# Patient Record
Sex: FEMALE | Race: WHITE | NOT HISPANIC OR LATINO | Employment: OTHER | URBAN - METROPOLITAN AREA
[De-identification: names, ages, dates, MRNs, and addresses within clinical notes are randomized per-mention and may not be internally consistent; named-entity substitution may affect disease eponyms.]

---

## 2017-04-20 RX ORDER — CLOPIDOGREL BISULFATE 75 MG/1
TABLET ORAL
Qty: 90 TABLET | Refills: 3 | Status: SHIPPED | OUTPATIENT
Start: 2017-04-20 | End: 2018-05-02 | Stop reason: SDUPTHER

## 2017-08-24 ENCOUNTER — APPOINTMENT (OUTPATIENT)
Dept: GENERAL RADIOLOGY | Facility: HOSPITAL | Age: 72
End: 2017-08-24

## 2017-08-24 ENCOUNTER — HOSPITAL ENCOUNTER (OUTPATIENT)
Facility: HOSPITAL | Age: 72
Setting detail: OBSERVATION
Discharge: HOME | End: 2017-08-27
Attending: EMERGENCY MEDICINE | Admitting: HOSPITALIST

## 2017-08-24 ENCOUNTER — APPOINTMENT (OUTPATIENT)
Dept: CT IMAGING | Facility: HOSPITAL | Age: 72
End: 2017-08-24

## 2017-08-24 DIAGNOSIS — S39.012A LUMBAR STRAIN, INITIAL ENCOUNTER: ICD-10-CM

## 2017-08-24 DIAGNOSIS — R26.2 DIFFICULTY WALKING: ICD-10-CM

## 2017-08-24 DIAGNOSIS — R55 SYNCOPE AND COLLAPSE: Primary | ICD-10-CM

## 2017-08-24 DIAGNOSIS — S09.90XA HEAD INJURY, INITIAL ENCOUNTER: ICD-10-CM

## 2017-08-24 LAB
ALBUMIN SERPL-MCNC: 4.1 G/DL (ref 3.5–5.2)
ALBUMIN/GLOB SERPL: 1.4 G/DL
ALP SERPL-CCNC: 83 U/L (ref 39–117)
ALT SERPL W P-5'-P-CCNC: 13 U/L (ref 1–33)
ANION GAP SERPL CALCULATED.3IONS-SCNC: 14.7 MMOL/L
AST SERPL-CCNC: 12 U/L (ref 1–32)
BASOPHILS # BLD AUTO: 0.01 10*3/MM3 (ref 0–0.2)
BASOPHILS NFR BLD AUTO: 0.1 % (ref 0–1.5)
BILIRUB SERPL-MCNC: 0.4 MG/DL (ref 0.1–1.2)
BUN BLD-MCNC: 27 MG/DL (ref 8–23)
BUN/CREAT SERPL: 26.2 (ref 7–25)
CALCIUM SPEC-SCNC: 9.4 MG/DL (ref 8.6–10.5)
CHLORIDE SERPL-SCNC: 102 MMOL/L (ref 98–107)
CO2 SERPL-SCNC: 25.3 MMOL/L (ref 22–29)
CREAT BLD-MCNC: 1.03 MG/DL (ref 0.57–1)
DEPRECATED RDW RBC AUTO: 43.5 FL (ref 37–54)
EOSINOPHIL # BLD AUTO: 0.04 10*3/MM3 (ref 0–0.7)
EOSINOPHIL NFR BLD AUTO: 0.3 % (ref 0.3–6.2)
ERYTHROCYTE [DISTWIDTH] IN BLOOD BY AUTOMATED COUNT: 13.7 % (ref 11.7–13)
GFR SERPL CREATININE-BSD FRML MDRD: 53 ML/MIN/1.73
GLOBULIN UR ELPH-MCNC: 2.9 GM/DL
GLUCOSE BLD-MCNC: 139 MG/DL (ref 65–99)
GLUCOSE BLDC GLUCOMTR-MCNC: 103 MG/DL (ref 70–130)
HCT VFR BLD AUTO: 41.8 % (ref 35.6–45.5)
HGB BLD-MCNC: 13.4 G/DL (ref 11.9–15.5)
HOLD SPECIMEN: NORMAL
HOLD SPECIMEN: NORMAL
IMM GRANULOCYTES # BLD: 0.03 10*3/MM3 (ref 0–0.03)
IMM GRANULOCYTES NFR BLD: 0.3 % (ref 0–0.5)
INR PPP: 1 (ref 0.9–1.1)
LYMPHOCYTES # BLD AUTO: 0.85 10*3/MM3 (ref 0.9–4.8)
LYMPHOCYTES NFR BLD AUTO: 7.3 % (ref 19.6–45.3)
MAGNESIUM SERPL-MCNC: 2.2 MG/DL (ref 1.6–2.4)
MCH RBC QN AUTO: 27.9 PG (ref 26.9–32)
MCHC RBC AUTO-ENTMCNC: 32.1 G/DL (ref 32.4–36.3)
MCV RBC AUTO: 87.1 FL (ref 80.5–98.2)
MONOCYTES # BLD AUTO: 0.93 10*3/MM3 (ref 0.2–1.2)
MONOCYTES NFR BLD AUTO: 7.9 % (ref 5–12)
NEUTROPHILS # BLD AUTO: 9.84 10*3/MM3 (ref 1.9–8.1)
NEUTROPHILS NFR BLD AUTO: 84.1 % (ref 42.7–76)
PLATELET # BLD AUTO: 201 10*3/MM3 (ref 140–500)
PMV BLD AUTO: 11.7 FL (ref 6–12)
POTASSIUM BLD-SCNC: 4.9 MMOL/L (ref 3.5–5.2)
PROT SERPL-MCNC: 7 G/DL (ref 6–8.5)
PROTHROMBIN TIME: 12.8 SECONDS (ref 11.7–14.2)
RBC # BLD AUTO: 4.8 10*6/MM3 (ref 3.9–5.2)
SODIUM BLD-SCNC: 142 MMOL/L (ref 136–145)
TROPONIN T SERPL-MCNC: <0.01 NG/ML (ref 0–0.03)
WBC NRBC COR # BLD: 11.7 10*3/MM3 (ref 4.5–10.7)
WHOLE BLOOD HOLD SPECIMEN: NORMAL
WHOLE BLOOD HOLD SPECIMEN: NORMAL

## 2017-08-24 PROCEDURE — 93005 ELECTROCARDIOGRAM TRACING: CPT

## 2017-08-24 PROCEDURE — 85025 COMPLETE CBC W/AUTO DIFF WBC: CPT | Performed by: EMERGENCY MEDICINE

## 2017-08-24 PROCEDURE — 71010 HC CHEST PA OR AP: CPT

## 2017-08-24 PROCEDURE — 96361 HYDRATE IV INFUSION ADD-ON: CPT

## 2017-08-24 PROCEDURE — 36415 COLL VENOUS BLD VENIPUNCTURE: CPT

## 2017-08-24 PROCEDURE — 96374 THER/PROPH/DIAG INJ IV PUSH: CPT

## 2017-08-24 PROCEDURE — 83735 ASSAY OF MAGNESIUM: CPT | Performed by: EMERGENCY MEDICINE

## 2017-08-24 PROCEDURE — 72110 X-RAY EXAM L-2 SPINE 4/>VWS: CPT

## 2017-08-24 PROCEDURE — 25010000002 ONDANSETRON PER 1 MG: Performed by: EMERGENCY MEDICINE

## 2017-08-24 PROCEDURE — 80053 COMPREHEN METABOLIC PANEL: CPT | Performed by: EMERGENCY MEDICINE

## 2017-08-24 PROCEDURE — 93010 ELECTROCARDIOGRAM REPORT: CPT | Performed by: INTERNAL MEDICINE

## 2017-08-24 PROCEDURE — 84484 ASSAY OF TROPONIN QUANT: CPT | Performed by: EMERGENCY MEDICINE

## 2017-08-24 PROCEDURE — 82962 GLUCOSE BLOOD TEST: CPT

## 2017-08-24 PROCEDURE — G0378 HOSPITAL OBSERVATION PER HR: HCPCS

## 2017-08-24 PROCEDURE — 99285 EMERGENCY DEPT VISIT HI MDM: CPT

## 2017-08-24 PROCEDURE — 85610 PROTHROMBIN TIME: CPT | Performed by: EMERGENCY MEDICINE

## 2017-08-24 PROCEDURE — 70450 CT HEAD/BRAIN W/O DYE: CPT

## 2017-08-24 RX ORDER — SODIUM CHLORIDE 0.9 % (FLUSH) 0.9 %
10 SYRINGE (ML) INJECTION AS NEEDED
Status: DISCONTINUED | OUTPATIENT
Start: 2017-08-24 | End: 2017-08-27

## 2017-08-24 RX ORDER — SODIUM CHLORIDE 0.9 % (FLUSH) 0.9 %
10 SYRINGE (ML) INJECTION AS NEEDED
Status: DISCONTINUED | OUTPATIENT
Start: 2017-08-24 | End: 2017-08-27 | Stop reason: HOSPADM

## 2017-08-24 RX ORDER — SODIUM CHLORIDE 9 MG/ML
125 INJECTION, SOLUTION INTRAVENOUS CONTINUOUS
Status: DISCONTINUED | OUTPATIENT
Start: 2017-08-24 | End: 2017-08-25

## 2017-08-24 RX ORDER — ONDANSETRON 2 MG/ML
4 INJECTION INTRAMUSCULAR; INTRAVENOUS ONCE
Status: COMPLETED | OUTPATIENT
Start: 2017-08-24 | End: 2017-08-24

## 2017-08-24 RX ADMIN — SODIUM CHLORIDE 125 ML/HR: 9 INJECTION, SOLUTION INTRAVENOUS at 22:23

## 2017-08-24 RX ADMIN — ONDANSETRON 4 MG: 2 INJECTION INTRAMUSCULAR; INTRAVENOUS at 22:23

## 2017-08-24 RX ADMIN — SODIUM CHLORIDE 500 ML: 9 INJECTION, SOLUTION INTRAVENOUS at 22:29

## 2017-08-25 ENCOUNTER — APPOINTMENT (OUTPATIENT)
Dept: NEUROLOGY | Facility: HOSPITAL | Age: 72
End: 2017-08-25
Attending: PSYCHIATRY & NEUROLOGY

## 2017-08-25 ENCOUNTER — APPOINTMENT (OUTPATIENT)
Dept: CARDIOLOGY | Facility: HOSPITAL | Age: 72
End: 2017-08-25
Attending: HOSPITALIST

## 2017-08-25 ENCOUNTER — APPOINTMENT (OUTPATIENT)
Dept: MRI IMAGING | Facility: HOSPITAL | Age: 72
End: 2017-08-25

## 2017-08-25 LAB
BH CV XLRA MEAS LEFT DIST CCA EDV: 20.4 CM/SEC
BH CV XLRA MEAS LEFT DIST CCA PSV: 85.6 CM/SEC
BH CV XLRA MEAS LEFT DIST ICA EDV: -53.4 CM/SEC
BH CV XLRA MEAS LEFT DIST ICA PSV: -171 CM/SEC
BH CV XLRA MEAS LEFT ICA/CCA RATIO: 2.2
BH CV XLRA MEAS LEFT MID ICA EDV: -52.3 CM/SEC
BH CV XLRA MEAS LEFT MID ICA PSV: -189 CM/SEC
BH CV XLRA MEAS LEFT PROX CCA EDV: 21.2 CM/SEC
BH CV XLRA MEAS LEFT PROX CCA PSV: 103 CM/SEC
BH CV XLRA MEAS LEFT PROX ECA EDV: -17.3 CM/SEC
BH CV XLRA MEAS LEFT PROX ECA PSV: -129 CM/SEC
BH CV XLRA MEAS LEFT PROX ICA EDV: -50.8 CM/SEC
BH CV XLRA MEAS LEFT PROX ICA PSV: -179 CM/SEC
BH CV XLRA MEAS LEFT PROX SCLA PSV: 211 CM/SEC
BH CV XLRA MEAS LEFT VERTEBRAL A EDV: -14.1 CM/SEC
BH CV XLRA MEAS LEFT VERTEBRAL A PSV: -53.4 CM/SEC
BH CV XLRA MEAS RIGHT DIST CCA EDV: 15.2 CM/SEC
BH CV XLRA MEAS RIGHT DIST CCA PSV: 68 CM/SEC
BH CV XLRA MEAS RIGHT DIST ICA EDV: -32.8 CM/SEC
BH CV XLRA MEAS RIGHT DIST ICA PSV: -141 CM/SEC
BH CV XLRA MEAS RIGHT ICA/CCA RATIO: 2
BH CV XLRA MEAS RIGHT MID ICA EDV: 29.1 CM/SEC
BH CV XLRA MEAS RIGHT MID ICA PSV: 108 CM/SEC
BH CV XLRA MEAS RIGHT PROX CCA EDV: 18.8 CM/SEC
BH CV XLRA MEAS RIGHT PROX CCA PSV: 85 CM/SEC
BH CV XLRA MEAS RIGHT PROX ECA EDV: -14.1 CM/SEC
BH CV XLRA MEAS RIGHT PROX ECA PSV: -113 CM/SEC
BH CV XLRA MEAS RIGHT PROX ICA EDV: -39.3 CM/SEC
BH CV XLRA MEAS RIGHT PROX ICA PSV: -137 CM/SEC
BH CV XLRA MEAS RIGHT PROX SCLA PSV: 156 CM/SEC
BH CV XLRA MEAS RIGHT VERTEBRAL A EDV: 11.1 CM/SEC
BH CV XLRA MEAS RIGHT VERTEBRAL A PSV: 49.8 CM/SEC
LEFT ARM BP: NORMAL MMHG
RIGHT ARM BP: NORMAL MMHG

## 2017-08-25 PROCEDURE — 25010000002 PERFLUTREN (DEFINITY) 8.476 MG IN SODIUM CHLORIDE 10 ML INJECTION: Performed by: HOSPITALIST

## 2017-08-25 PROCEDURE — 96376 TX/PRO/DX INJ SAME DRUG ADON: CPT

## 2017-08-25 PROCEDURE — 95819 EEG AWAKE AND ASLEEP: CPT | Performed by: PSYCHIATRY & NEUROLOGY

## 2017-08-25 PROCEDURE — 95819 EEG AWAKE AND ASLEEP: CPT

## 2017-08-25 PROCEDURE — G0378 HOSPITAL OBSERVATION PER HR: HCPCS

## 2017-08-25 PROCEDURE — 25010000002 ONDANSETRON PER 1 MG: Performed by: HOSPITALIST

## 2017-08-25 PROCEDURE — 99204 OFFICE O/P NEW MOD 45 MIN: CPT | Performed by: PSYCHIATRY & NEUROLOGY

## 2017-08-25 PROCEDURE — 93306 TTE W/DOPPLER COMPLETE: CPT | Performed by: INTERNAL MEDICINE

## 2017-08-25 PROCEDURE — 25810000003 SODIUM CHLORIDE 0.9 % WITH KCL 20 MEQ 20-0.9 MEQ/L-% SOLUTION: Performed by: HOSPITALIST

## 2017-08-25 PROCEDURE — 0 GADOBENATE DIMEGLUMINE 529 MG/ML SOLUTION: Performed by: HOSPITALIST

## 2017-08-25 PROCEDURE — 93880 EXTRACRANIAL BILAT STUDY: CPT

## 2017-08-25 PROCEDURE — 25010000002 LORAZEPAM PER 2 MG: Performed by: HOSPITALIST

## 2017-08-25 PROCEDURE — 70553 MRI BRAIN STEM W/O & W/DYE: CPT

## 2017-08-25 PROCEDURE — C8929 TTE W OR WO FOL WCON,DOPPLER: HCPCS

## 2017-08-25 PROCEDURE — 96375 TX/PRO/DX INJ NEW DRUG ADDON: CPT

## 2017-08-25 PROCEDURE — 96361 HYDRATE IV INFUSION ADD-ON: CPT

## 2017-08-25 PROCEDURE — A9577 INJ MULTIHANCE: HCPCS | Performed by: HOSPITALIST

## 2017-08-25 RX ORDER — ASPIRIN 81 MG/1
81 TABLET, CHEWABLE ORAL DAILY
Status: DISCONTINUED | OUTPATIENT
Start: 2017-08-25 | End: 2017-08-25

## 2017-08-25 RX ORDER — MELATONIN
2000 NIGHTLY
Status: DISCONTINUED | OUTPATIENT
Start: 2017-08-25 | End: 2017-08-27 | Stop reason: HOSPADM

## 2017-08-25 RX ORDER — ACETAMINOPHEN 160 MG
2000 TABLET,DISINTEGRATING ORAL DAILY
Status: DISCONTINUED | OUTPATIENT
Start: 2017-08-25 | End: 2017-08-25 | Stop reason: CLARIF

## 2017-08-25 RX ORDER — AMLODIPINE BESYLATE 10 MG/1
10 TABLET ORAL NIGHTLY
Status: DISCONTINUED | OUTPATIENT
Start: 2017-08-25 | End: 2017-08-26 | Stop reason: SDUPTHER

## 2017-08-25 RX ORDER — ATORVASTATIN CALCIUM 20 MG/1
20 TABLET, FILM COATED ORAL DAILY
Status: DISCONTINUED | OUTPATIENT
Start: 2017-08-25 | End: 2017-08-25

## 2017-08-25 RX ORDER — ACETAMINOPHEN 325 MG/1
650 TABLET ORAL EVERY 4 HOURS PRN
Status: DISCONTINUED | OUTPATIENT
Start: 2017-08-25 | End: 2017-08-27

## 2017-08-25 RX ORDER — LORAZEPAM 2 MG/ML
1 INJECTION INTRAMUSCULAR ONCE
Status: COMPLETED | OUTPATIENT
Start: 2017-08-25 | End: 2017-08-25

## 2017-08-25 RX ORDER — ATORVASTATIN CALCIUM 20 MG/1
10 TABLET, FILM COATED ORAL NIGHTLY
Status: DISCONTINUED | OUTPATIENT
Start: 2017-08-25 | End: 2017-08-27 | Stop reason: HOSPADM

## 2017-08-25 RX ORDER — AMLODIPINE BESYLATE 10 MG/1
10 TABLET ORAL DAILY
Status: DISCONTINUED | OUTPATIENT
Start: 2017-08-25 | End: 2017-08-25

## 2017-08-25 RX ORDER — AMLODIPINE BESYLATE 5 MG/1
5 TABLET ORAL DAILY
Status: DISCONTINUED | OUTPATIENT
Start: 2017-08-25 | End: 2017-08-25

## 2017-08-25 RX ORDER — VALSARTAN 160 MG/1
160 TABLET ORAL DAILY
Status: DISCONTINUED | OUTPATIENT
Start: 2017-08-25 | End: 2017-08-25

## 2017-08-25 RX ORDER — ONDANSETRON 2 MG/ML
4 INJECTION INTRAMUSCULAR; INTRAVENOUS EVERY 4 HOURS PRN
Status: DISCONTINUED | OUTPATIENT
Start: 2017-08-25 | End: 2017-08-27

## 2017-08-25 RX ORDER — ASPIRIN 81 MG/1
81 TABLET, CHEWABLE ORAL NIGHTLY
Status: DISCONTINUED | OUTPATIENT
Start: 2017-08-25 | End: 2017-08-26

## 2017-08-25 RX ORDER — ATORVASTATIN CALCIUM 80 MG/1
80 TABLET, FILM COATED ORAL NIGHTLY
Status: DISCONTINUED | OUTPATIENT
Start: 2017-08-25 | End: 2017-08-25

## 2017-08-25 RX ORDER — ZOLPIDEM TARTRATE 5 MG/1
10 TABLET ORAL NIGHTLY PRN
Status: DISCONTINUED | OUTPATIENT
Start: 2017-08-25 | End: 2017-08-27

## 2017-08-25 RX ORDER — ONDANSETRON 2 MG/ML
4 INJECTION INTRAMUSCULAR; INTRAVENOUS ONCE
Status: DISCONTINUED | OUTPATIENT
Start: 2017-08-25 | End: 2017-08-25

## 2017-08-25 RX ORDER — MELATONIN
2000 DAILY
Status: DISCONTINUED | OUTPATIENT
Start: 2017-08-25 | End: 2017-08-25

## 2017-08-25 RX ORDER — PANTOPRAZOLE SODIUM 40 MG/1
40 TABLET, DELAYED RELEASE ORAL
Status: DISCONTINUED | OUTPATIENT
Start: 2017-08-25 | End: 2017-08-26

## 2017-08-25 RX ORDER — ATORVASTATIN CALCIUM 10 MG/1
10 TABLET, FILM COATED ORAL DAILY
Status: DISCONTINUED | OUTPATIENT
Start: 2017-08-25 | End: 2017-08-25

## 2017-08-25 RX ORDER — CLOPIDOGREL BISULFATE 75 MG/1
75 TABLET ORAL DAILY
Status: DISCONTINUED | OUTPATIENT
Start: 2017-08-25 | End: 2017-08-25

## 2017-08-25 RX ORDER — CLOPIDOGREL BISULFATE 75 MG/1
75 TABLET ORAL DAILY
Status: DISCONTINUED | OUTPATIENT
Start: 2017-08-25 | End: 2017-08-27 | Stop reason: HOSPADM

## 2017-08-25 RX ORDER — FAMOTIDINE 20 MG/1
20 TABLET, FILM COATED ORAL 2 TIMES DAILY
Status: DISCONTINUED | OUTPATIENT
Start: 2017-08-25 | End: 2017-08-25

## 2017-08-25 RX ORDER — VALSARTAN 40 MG/1
40 TABLET ORAL DAILY
Status: DISCONTINUED | OUTPATIENT
Start: 2017-08-25 | End: 2017-08-27 | Stop reason: HOSPADM

## 2017-08-25 RX ORDER — SODIUM CHLORIDE AND POTASSIUM CHLORIDE 150; 900 MG/100ML; MG/100ML
75 INJECTION, SOLUTION INTRAVENOUS CONTINUOUS
Status: DISCONTINUED | OUTPATIENT
Start: 2017-08-25 | End: 2017-08-27

## 2017-08-25 RX ADMIN — PERFLUTREN 6 ML: 6.52 INJECTION, SUSPENSION INTRAVENOUS at 14:15

## 2017-08-25 RX ADMIN — ONDANSETRON 4 MG: 2 INJECTION INTRAMUSCULAR; INTRAVENOUS at 19:53

## 2017-08-25 RX ADMIN — POTASSIUM CHLORIDE AND SODIUM CHLORIDE 75 ML/HR: 900; 150 INJECTION, SOLUTION INTRAVENOUS at 03:44

## 2017-08-25 RX ADMIN — ONDANSETRON 4 MG: 2 INJECTION INTRAMUSCULAR; INTRAVENOUS at 15:46

## 2017-08-25 RX ADMIN — POTASSIUM CHLORIDE AND SODIUM CHLORIDE 125 ML/HR: 900; 150 INJECTION, SOLUTION INTRAVENOUS at 15:46

## 2017-08-25 RX ADMIN — GADOBENATE DIMEGLUMINE 20 ML: 529 INJECTION, SOLUTION INTRAVENOUS at 20:40

## 2017-08-25 RX ADMIN — ACETAMINOPHEN 650 MG: 325 TABLET ORAL at 03:44

## 2017-08-25 RX ADMIN — LORAZEPAM 1 MG: 2 INJECTION INTRAMUSCULAR; INTRAVENOUS at 19:53

## 2017-08-25 RX ADMIN — ONDANSETRON 4 MG: 2 INJECTION INTRAMUSCULAR; INTRAVENOUS at 03:44

## 2017-08-25 NOTE — ED NOTES
Syncopal episode while standing outside of [employer] watching someone change her car battery. Pt reports feeling hot & nauseated just before incident. Pt reports low back pn, & headache from falling during the incident.      Marcie Devries RN  08/24/17 2050

## 2017-08-25 NOTE — PROGRESS NOTES
Discharge Planning Assessment  [facility]     Patient Name: Mag Silva  MRN: [id number]  Today's Date: 8/25/2017    Admit Date: 8/24/2017          Discharge Needs Assessment       08/25/17 1045    Living Environment    Lives With alone    Living Arrangements house    Home Accessibility no concerns;bed and bath on same level    Stair Railings at Home outside, present at both sides    Type of Financial/Environmental Concern none    Transportation Available car    Living Environment    Quality Of Family Relationships supportive    Able to Return to Prior Living Arrangements yes    Discharge Needs Assessment    Concerns To Be Addressed no discharge needs identified    Readmission Within The Last 30 Days no previous admission in last 30 days    Anticipated Changes Related to Illness none    Equipment Currently Used at Home none    Equipment Needed After Discharge none            Discharge Plan       08/25/17 1045    Case Management/Social Work Plan    Plan home- no needs    Patient/Family In Agreement With Plan yes    Additional Comments [facility] notice signed 08/25/17.  Facesheet verified.  Spoke with patient in room.  Introduced self and explained role.  Patient lives in a ss house alone.  Patient IADLS prior to admit.  She does not use any DME and does not have a HH or SNU history.  Denies any needs and plans to return home.  CCP will follow.         Discharge Placement     No information found                Demographic Summary       08/25/17 1043    Referral Information    Admission Type observation    Referral Source admission list    Reason For Consult discharge planning    Primary Care Physician Information    Name Dr Caceres            Functional Status       08/25/17 1043    Functional Status Current    Ambulation 0-->independent    Transferring 0-->independent    Toileting 0-->independent    Bathing 0-->independent    Dressing 0-->independent    Eating 0-->independent    Communication  0-->understands/communicates without difficulty    Swallowing (if score 2 or more for any item, consult Rehab Services) 0-->swallows foods/liquids without difficulty    Change in Functional Status Since Onset of Current Illness/Injury no    Functional Status Prior    Ambulation 0-->independent    Transferring 0-->independent    Toileting 0-->independent    Bathing 0-->independent    Dressing 0-->independent    Eating 0-->independent    Communication 0-->understands/communicates without difficulty    Swallowing 0-->swallows foods/liquids without difficulty    Activity Tolerance    Current Activity Limitations none    Cognitive/Perceptual/Developmental    Current Mental Status/Cognitive Functioning no deficits noted    Recent Changes in Mental Status/Cognitive Functioning no changes            Psychosocial     None            Abuse/Neglect     None            Legal     None            Substance Abuse     None            Patient Forms     None          Dana Anne, RN

## 2017-08-25 NOTE — SIGNIFICANT NOTE
08/25/17 1237   Rehab Treatment   Discipline physical therapist   Rehab Evaluation   Evaluation Not Performed patient unavailable for evaluation  (off the floor to EEG. Will follow up tomorrow.)   Recommendation   PT - Next Appointment 08/26/17

## 2017-08-25 NOTE — PLAN OF CARE
Problem: Patient Care Overview (Adult)  Goal: Plan of Care Review  Outcome: Ongoing (interventions implemented as appropriate)    08/25/17 0322   Coping/Psychosocial Response Interventions   Plan Of Care Reviewed With patient   Patient Care Overview   Progress progress toward functional goals as expected   Outcome Evaluation   Outcome Summary/Follow up Plan VSS, pt admit for ER with syncopal episodes, c/o back pain and nausea, gave medications for both, will continue to monitor.          Problem: Pain, Acute (Adult)  Goal: Identify Related Risk Factors and Signs and Symptoms  Outcome: Ongoing (interventions implemented as appropriate)  Goal: Acceptable Pain Control/Comfort Level  Outcome: Ongoing (interventions implemented as appropriate)

## 2017-08-25 NOTE — NURSING NOTE
MRI delayed- trying to retrieve records from [facility] and patient for information regarding stent type and name. [facility] records received and noted.     Discussed with MRI- will wait until patient brings personal card to see if information is present.

## 2017-08-25 NOTE — ED PROVIDER NOTES
EMERGENCY DEPARTMENT ENCOUNTER    CHIEF COMPLAINT  Chief Complaint: Syncope  History given by: patient, pt's family  History limited by: nothing  Room Number: 26/26  PMD: Vicki Caceres MD      HPI:  Pt is a 71 y.o. female who presents complaining of a syncopal episode that occurred today. Pt states that she was standing outside a store in the parking area and fainted, reporting she struck her head and that her back landed on a cement curb. Pt states she felt nauseated before the episode, but denies any CP, palpitations, or SOA prior to the episode. Pt reports that witnesses stated that she was unconscious for less than 1 minute. Pt complains of back pain, and nausea. Pt denies urinary incontinence, melena, hematochezia, headache, vomiting/diarrhea recently, previous episodes, changes in appetite, hx of seizures, cough, or cold. Pt is on Plavix.     Duration:  PTA  Onset: sudden  Timing: episodic  Quality: syncope  Intensity/Severity: moderate  Progression: resolved  Associated Symptoms: nausea, back pain  Aggravating Factors: none specified  Alleviating Factors: none specified  Previous Episodes: Pt has had no previous episodes  Treatment before arrival: None    PAST MEDICAL HISTORY  Active Ambulatory Problems     Diagnosis Date Noted   • Coronary arteriosclerosis in native artery 04/28/2016   • Benign essential hypertension 04/28/2016   • Chronic coronary artery disease 03/04/2013   • Chest pain 01/26/2013   • Chronic obstructive pulmonary disease 01/26/2013   • Dyslipidemia 01/26/2013   • Ventricular premature beats 04/28/2016   • Syncope 01/26/2013   • Hyperlipidemia 11/30/2016     Resolved Ambulatory Problems     Diagnosis Date Noted   • No Resolved Ambulatory Problems     Past Medical History:   Diagnosis Date   • Chest pain    • COPD (chronic obstructive pulmonary disease)    • Coronary artery disease    • Crescendo angina    • Dyslipidemia    • Hypertension    • PVC (premature ventricular contraction)    •  Syncope        PAST SURGICAL HISTORY  Past Surgical History:   Procedure Laterality Date   • BACK SURGERY     • CARDIAC CATHETERIZATION     • CAROTID STENT     • HYSTERECTOMY     • NECK SURGERY         FAMILY HISTORY  Family History   Problem Relation Age of Onset   • Stroke Mother    • Asthma Father    • Cancer Father        SOCIAL HISTORY  Social History     Social History   • Marital status:      Spouse name: N/A   • Number of children: N/A   • Years of education: N/A     Occupational History   • Not on file.     Social History Main Topics   • Smoking status: Former Smoker   • Smokeless tobacco: Not on file   • Alcohol use No   • Drug use: Not on file   • Sexual activity: Not on file     Other Topics Concern   • Not on file     Social History Narrative       ALLERGIES  Theophyllines    REVIEW OF SYSTEMS  Review of Systems   Constitutional: Negative for chills and fever.   HENT: Negative for sore throat and trouble swallowing.    Eyes: Negative for visual disturbance.   Respiratory: Negative for cough and shortness of breath.    Cardiovascular: Negative for chest pain, palpitations and leg swelling.   Gastrointestinal: Positive for nausea. Negative for abdominal pain, diarrhea and vomiting.   Endocrine: Negative.    Genitourinary: Negative for decreased urine volume, dysuria and frequency.   Musculoskeletal: Positive for back pain. Negative for neck pain.   Skin: Negative for rash.   Allergic/Immunologic: Negative.    Neurological: Positive for syncope. Negative for weakness, numbness and headaches.   Hematological: Negative.    Psychiatric/Behavioral: Negative.    All other systems reviewed and are negative.      PHYSICAL EXAM  ED Triage Vitals   Temp Heart Rate Resp BP SpO2   08/24/17 2052 08/24/17 2050 08/24/17 2050 08/24/17 2050 08/24/17 2050   97 °F (36.1 °C) 75 18 140/85 95 %      Temp src Heart Rate Source Patient Position BP Location FiO2 (%)   -- 08/24/17 2103 08/24/17 2103 08/24/17 2103 --     Monitor Lying Left arm        Physical Exam   Constitutional: She is oriented to person, place, and time. She appears distressed (mild).   HENT:   Head: Normocephalic.   No obvious contusions/swelling to head/face   Eyes: EOM are normal.   Neck: Normal range of motion. Neck supple.   Cardiovascular: Normal rate, regular rhythm, normal heart sounds and intact distal pulses.    No murmur heard.  Pulses:       Posterior tibial pulses are 2+ on the right side, and 2+ on the left side.   Pulmonary/Chest: Effort normal and breath sounds normal. No respiratory distress.   Abdominal: Soft. Bowel sounds are normal. There is no tenderness. There is no rebound, no guarding and no CVA tenderness.   Musculoskeletal: Normal range of motion. She exhibits no edema.        Cervical back: She exhibits no tenderness.        Thoracic back: She exhibits no bony tenderness.        Lumbar back: She exhibits bony tenderness (diffuse without step off).   No step off  No swelling/eccymosis/abrasion noted to back   Neurological: She is alert and oriented to person, place, and time.   Skin: Skin is warm and dry.   No obvious bruising   Psychiatric: Affect normal.   Nursing note and vitals reviewed.      LAB RESULTS  Lab Results (last 24 hours)     Procedure Component Value Units Date/Time    CBC & Differential [[id number]] Collected:  08/24/17 2145    Specimen:  Blood Updated:  08/24/17 2156    Narrative:       The following orders were created for panel order CBC & Differential.  Procedure                               Abnormality         Status                     ---------                               -----------         ------                     CBC Auto Differential[[id number]]        Abnormal            Final result                 Please view results for these tests on the individual orders.    Comprehensive Metabolic Panel [[id number]]  (Abnormal) Collected:  08/24/17 2145    Specimen:  Blood Updated:  08/24/17 2215     Glucose 139 (H)  mg/dL      BUN 27 (H) mg/dL      Creatinine 1.03 (H) mg/dL      Sodium 142 mmol/L      Potassium 4.9 mmol/L      Chloride 102 mmol/L      CO2 25.3 mmol/L      Calcium 9.4 mg/dL      Total Protein 7.0 g/dL      Albumin 4.10 g/dL      ALT (SGPT) 13 U/L      AST (SGOT) 12 U/L      Alkaline Phosphatase 83 U/L      Total Bilirubin 0.4 mg/dL      eGFR Non African Amer 53 (L) mL/min/1.73      Globulin 2.9 gm/dL      A/G Ratio 1.4 g/dL      BUN/Creatinine Ratio 26.2 (H)     Anion Gap 14.7 mmol/L     Magnesium [[id number]]  (Normal) Collected:  08/24/17 2145    Specimen:  Blood Updated:  08/24/17 2215     Magnesium 2.2 mg/dL     Troponin [[id number]]  (Normal) Collected:  08/24/17 2145    Specimen:  Blood Updated:  08/24/17 2215     Troponin T <0.010 ng/mL     Narrative:       Troponin T Reference Ranges:  Less than 0.03 ng/mL:    Negative for AMI  0.03 to 0.09 ng/mL:      Indeterminant for AMI  Greater than 0.09 ng/mL: Positive for AMI    CBC Auto Differential [[id number]]  (Abnormal) Collected:  08/24/17 2145    Specimen:  Blood Updated:  08/24/17 2156     WBC 11.70 (H) 10*3/mm3      RBC 4.80 10*6/mm3      Hemoglobin 13.4 g/dL      Hematocrit 41.8 %      MCV 87.1 fL      MCH 27.9 pg      MCHC 32.1 (L) g/dL      RDW 13.7 (H) %      RDW-SD 43.5 fl      MPV 11.7 fL      Platelets 201 10*3/mm3      Neutrophil % 84.1 (H) %      Lymphocyte % 7.3 (L) %      Monocyte % 7.9 %      Eosinophil % 0.3 %      Basophil % 0.1 %      Immature Grans % 0.3 %      Neutrophils, Absolute 9.84 (H) 10*3/mm3      Lymphocytes, Absolute 0.85 (L) 10*3/mm3      Monocytes, Absolute 0.93 10*3/mm3      Eosinophils, Absolute 0.04 10*3/mm3      Basophils, Absolute 0.01 10*3/mm3      Immature Grans, Absolute 0.03 10*3/mm3     Protime-INR [[id number]]  (Normal) Collected:  08/24/17 2145    Specimen:  Blood Updated:  08/24/17 2250     Protime 12.8 Seconds      INR 1.00    POC Glucose Fingerstick [[id number]]  (Normal) Collected:  08/24/17 2157    Specimen:  Blood  Updated:  08/24/17 2159     Glucose 103 mg/dL     Narrative:       Meter: [id number] : [id number] Fausto Moreno  [employer]          I ordered the above labs and reviewed the results    RADIOLOGY  XR Spine Lumbar 4+ View   Preliminary Result   No acute findings.           ----------------------------------------------------------------       X-RAY LUMBAR SPINE 4 VIEWS.       HISTORY: Trauma, back pain.       COMPARISON: No prior studies for comparison.       FINDINGS:   Vertebral body height is normal, no acute fracture.  Straightening of   the lordotic curve may be due to muscle spasm. Multilevel mild spurring   and loss of intervertebral disc height, mild facet joint disease.       Severe atherosclerotic disease of the aorta.        IMPRESSION:   No fracture or subluxation of the lumbar spine.                  XR Chest 1 View   Preliminary Result   No acute findings.           ----------------------------------------------------------------       X-RAY LUMBAR SPINE 4 VIEWS.       HISTORY: Trauma, back pain.       COMPARISON: No prior studies for comparison.       FINDINGS:   Vertebral body height is normal, no acute fracture.  Straightening of   the lordotic curve may be due to muscle spasm. Multilevel mild spurring   and loss of intervertebral disc height, mild facet joint disease.       Severe atherosclerotic disease of the aorta.        IMPRESSION:   No fracture or subluxation of the lumbar spine.              CT Head Without Contrast   Preliminary Result   1.  No definite acute intracranial pathology.    2.  Fluid in both sphenoid sinuses may represent sinusitis.                       I ordered the above noted radiological studies. Interpreted by radiologist. Reviewed by me in PACS.       PROCEDURES  Procedures    EKG         EKG time: 2150   Rhythm/Rate: sinus rhythm, rate: 70s  Normal P waves and normal [place] interval   Normal QRS, Normal axis  Normal ST and T waves    Interpreted Contemporaneously by me,  independently viewed  unchanged compared to prior 2/24/16        PROGRESS AND CONSULTS  ED Course     2137  Ordered EKG, blood work, troponin, and magnesium for further evaluation.     2221  Ordered Zofran for nausea and IVF for hydration. Ordered CXR, CT head, and XR L spine for further evaluation.     0026  Placed call to [facility] for admission    0033  Discussed pt's case with Dr. Frances ([facility]), who agrees to admit to telemetry for further evaluation.     0053  Rechecked pt. Notified pt of lab and imaging findings that do not indicate a clear cause for her syncopal episodes, and plan to admit for further evaluation. Pt agrees to plan and all questions are addressed.     MEDICAL DECISION MAKING  Results were reviewed/discussed with the patient and they were also made aware of online access. Pt also made aware that some labs, such as cultures, will not be resulted during ER visit and follow up with PMD is necessary.     MDM  Number of Diagnoses or Management Options     Amount and/or Complexity of Data Reviewed  Clinical lab tests: ordered and reviewed  Tests in the radiology section of CPT®: ordered and reviewed  Tests in the medicine section of CPT®: ordered and reviewed (See procedure EKG note)  Decide to obtain previous medical records or to obtain history from someone other than the patient: yes    Patient Progress  Patient progress: stable         DIAGNOSIS  Final diagnoses:   Syncope and collapse   Head injury, initial encounter   Lumbar strain, initial encounter       DISPOSITION  ADMISSION    Discussed treatment plan and reason for admission with pt/family and admitting physician.  Pt/family voiced understanding of the plan for admission for further testing/treatment as needed.         Latest Documented Vital Signs:  As of 12:57 AM  BP- 144/63 HR- 86 Temp- 97 °F (36.1 °C) O2 sat- 94%    --  Documentation assistance provided by maurizio Hung for Dr. Rodriguez.  Information recorded by the maurizio was done  at my direction and has been verified and validated by me.        Adela Hung  08/25/17 0057       Debbie Rodriguez MD  08/26/17 5085

## 2017-08-25 NOTE — SIGNIFICANT NOTE
08/25/17 1454   Rehab Treatment   Discipline occupational therapist   Rehab Evaluation   Evaluation Not Performed patient unavailable for evaluation  (vascular 1454)

## 2017-08-25 NOTE — PLAN OF CARE
Problem: Patient Care Overview (Adult)  Goal: Plan of Care Review  Outcome: Ongoing (interventions implemented as appropriate)    08/25/17 7996   Coping/Psychosocial Response Interventions   Plan Of Care Reviewed With patient   Patient Care Overview   Progress no change   Outcome Evaluation   Outcome Summary/Follow up Plan EEG, carotid duplex and ECHO completed today, waiting on information for MRI screening sheet for MRI. nausea reported today, zofran given. report of diarrhea- need Cdiff sample an waiting for callback from MD for med order. PT/OT to see tomorrow- missed today. VSS, home meds added. no c/o dizziness thus far. will cont to monitor,

## 2017-08-25 NOTE — CONSULTS
"NEUROLOGY CONSULTATION        PATIENT Mag Silva    1945   MRN [id number]   ADMIT DATE 2017   LENGTH OF STAY 0 days   ATTENDING Perry Frances MD       HISTORY OF PRESENT ILLNESS:  This is a 71-year-old woman admitted for loss of consciousness episode.    I obtained history from the patient and I talked to her daughter who was a witness on the phone.  She was with her daughter at [facility].  With the battery problem and they were looking at the battery outside the store.  Some \"acid\" coming out of battery which smelled quite bad and upset the patient.  She walked away from her granddaughters car and sat in her truck which was just nearby.  Dr. yadav on the phone who says that the patient said that she just felt bad and complained in particular nausea.  He appeared to be in a daze but was still awake and was still making sense so she was talking but in low volumes.  He went around to the front of her truck and leaned on the front.  He then stood up and lost consciousness and hit her head hit the concrete and her daughter said that she could hear her head bounce.  He was unconscious for perhaps 2 minutes.  No convincing post ictal confusion.  Continued in the supine position to the EMTs arrived and she was brought to the hospital.  The granddaughter did not note any confusion.  There was no incontinence at that point apparently in the ER she was found to be incontinent.  Then this morning she had an episode of urinary incontinence but no seizure in the hospital.  Did not bite her tongue.    She has no history of epilepsy meningitis encephalitis or any other neurologic problems.    Apparently for 5 years ago lost consciousness she was at work she doesn't remember any details of this but she was not told by anyone that she had seizure.    At present the patient has no complaints.    CHIEF COMPLAINT: Syncope      DIAGNOSIS: Syncope and collapse [R55]  Syncope [R55]  Lumbar strain, initial " "encounter [S39.012A]  Head injury, initial encounter [S09.90XA]      PAST MEDICAL HISTORY:   Past Medical History:   Diagnosis Date   • Chest pain    • COPD (chronic obstructive pulmonary disease)    • Coronary artery disease    • Crescendo angina    • Dyslipidemia    • Hypertension    • PVC (premature ventricular contraction)    • Syncope        PAST SURGICAL HISTORY:  Past Surgical History:   Procedure Laterality Date   • BACK SURGERY     • CARDIAC CATHETERIZATION     • CAROTID STENT     • HYSTERECTOMY     • NECK SURGERY         CURRENT MEDICATIONS:  Scheduled Medications:  aspirin 81 mg Oral Daily   atorvastatin 80 mg Oral Nightly   clopidogrel 75 mg Oral Daily   pantoprazole 40 mg Oral Q AM     Infusions:   sodium chloride 0.9 % with KCl 20 mEq 75 mL/hr Last Rate: 75 mL/hr (08/25/17 0807)     PRN Medications:  •  acetaminophen  •  ondansetron  •  sodium chloride  •  Insert peripheral IV **AND** sodium chloride    SOCIAL HISTORY:  Social History     Social History   • Marital status:      Spouse name: N/A   • Number of children: N/A   • Years of education: N/A     Social History Main Topics   • Smoking status: Former Smoker     Packs/day: 1.50     Years: 20.00     Types: Cigarettes     Quit date: 1/25/2004   • Smokeless tobacco: None   • Alcohol use No   • Drug use: No   • Sexual activity: No     Other Topics Concern   • None     Social History Narrative       FAMILY HISTORY:   I have reviewed this patient's family history and it is not significant to the present illness.      REVIEW OF SYSTEMS: 14 system review performed and all other systems are negative except for Syncope         PHYSICAL EXAM:  GENERAL: Reveals a man/woman who appears his/her stated age, in no acute distress.  VITAL SIGNS: /60 (BP Location: Left arm, Patient Position: Lying)  Pulse 84  Temp 99.5 °F (37.5 °C) (Oral)   Resp 18  Ht 64\" (162.6 cm)  Wt 207 lb 8 oz (94.1 kg)  SpO2 95%  BMI 35.62 kg/m2  NECK: Carotids are equal " without bruits.   HEART: Regular rate and rhythm with no significant murmur or gallop.   EXTREMITIES: Nonedematous. Pulses are intact. There is no rash. There is no hepatosplenomegaly. No respiratory distress. There is no lymphadenopathy. There are no signs of cutaneous embolization.  NEUROLOGIC: Awake, alert and oriented x3. There is no aphasia or dysarthria.  Patient can do simple calculations and remembers two out of three objects in five minutes. Visual fields are full. Disks are flat. Cranial nerves II through XII are intact. Power is normal in all 4 extremities. Tone is normal. Reflexes are 2 and symmetric in the upper and lower extremities. Toes are downgoing. Sensations intact pinprick and joint position sense in all 4 extremities. Cerebellar function and gait are normal.      DIAGNOSTIC DATA:   Labs reviewed and revealed a mild leukocytosis white count of 11,700.  She also had a low-grade fever with a MAXIMUM TEMPERATURE 100.2.  Otherwise lab work is unremarkable.    Radiology images personally reviewed and revealed no significant abnormalities.  Specifically CT of the brain was reviewed and showed no significant abnormalities.      IMPRESSION: This was probably a vagal event.  I say Depakote as she spelled something that was quite bothersome to her and got nauseous according to patient she was diaphoretic and then she fainted and no tonic or clonic activity was seen.  She did have some incontinence but in total this sounds more like syncope than seizure.  Ever because incontinence and the slight leukocytosis and an slight fever think we should evaluated for seizure but if negative I would assume was syncope.    I note she has a history of a carotid stent.  I don't have the details related to this but carotid stenosis is not leading to syncope so I don't think this needs to be evaluated      Set up an MRI of the brain and a EEG.  If negative I think she can be released from the hospital and given the above  details I don't think we need to restrict her driving privileges.      Thank you for allowing me to see this patient.    Ibrahima Lujan MD  8/25/2017  11:04 AM

## 2017-08-25 NOTE — H&P
History and physical    Primary care physician  Dr. Caceres    Chief complaint  Syncopal episode    History of present illness  71 white female with history of COPD coronary artery disease hypertension hyperlipidemia brought to the emergency room when she has had a syncopal episode in the parking lot when she struck her head and back landed on the concrete.  No loss of consciousness but she lost control of her bladder.  No chest pain but she does have diarrhea but denies any nausea vomiting abdominal pain or increased shortness of breath.      PAST MEDICAL HISTORY    Diagnosis Date   • Chest pain     • COPD (chronic obstructive pulmonary disease)     • Coronary artery disease     • Crescendo angina     • Dyslipidemia     • Hypertension     • PVC (premature ventricular contraction)     • Syncope           PAST SURGICAL HISTORY   Surgical History          Past Surgical History:   Procedure Laterality Date   • BACK SURGERY       • CARDIAC CATHETERIZATION       • CAROTID STENT       • HYSTERECTOMY       • NECK SURGERY                FAMILY HISTORY        Family History   Problem Relation Age of Onset   • Stroke Mother     • Asthma Father     • Cancer Father           SOCIAL HISTORY   Social History    Social History            Social History   • Marital status:        Spouse name: N/A   • Number of children: N/A   • Years of education: N/A          Occupational History   • Not on file.           Social History Main Topics   • Smoking status: Former Smoker   • Smokeless tobacco: Not on file   • Alcohol use No   • Drug use: Not on file   • Sexual activity: Not on file           Other Topics Concern   • Not on file      Social History Narrative            ALLERGIES  Theophyllines    Home medications reviewed     REVIEW OF SYSTEMS  Review of Systems   Constitutional: Negative for chills and fever.   HENT: Negative for sore throat and trouble swallowing.    Eyes: Negative for visual disturbance.   Respiratory: Negative  "for cough and shortness of breath.    Cardiovascular: Negative for chest pain, palpitations and leg swelling.   Gastrointestinal: Positive for nausea. Negative for abdominal pain, diarrhea and vomiting.   Endocrine: Negative.    Genitourinary: Negative for decreased urine volume, dysuria and frequency.   Musculoskeletal: Positive for back pain. Negative for neck pain.   Skin: Negative for rash.   Allergic/Immunologic: Negative.    Neurological: Positive for syncope. Negative for weakness, numbness and headaches.   Hematological: Negative.    Psychiatric/Behavioral: Negative.    All other systems reviewed and are negative.        PHYSICAL EXAM.Blood pressure 113/74, pulse 82, temperature 98.7 °F (37.1 °C), temperature source Oral, resp. rate 18, height 64\" (162.6 cm), weight 207 lb 8 oz (94.1 kg), SpO2 95 %.    Constitutional: She is oriented to person, place, and time. She appears distressed (mild).   Head: Normocephalic and atraumatic.   Eyes: EOM are normal.   Neck: Normal range of motion. Neck supple.   Cardiovascular: Normal rate, regular rhythm, normal heart sounds and intact distal pulses.    No murmur heard.  Pulses:Posterior tibial pulses are 2+ on the right side, and 2+ on the left side.   Pulmonary/Chest: Effort normal and breath sounds normal. No respiratory distress.   Abdominal: Soft. Bowel sounds are normal. There is no tenderness. There is no rebound, no guarding and no CVA tenderness.   Musculoskeletal: Normal range of motion. She exhibits no edema. Cervical back: She exhibits no tenderness.   No step off   Neurological: She is alert and oriented to person, place, and time.   Skin: Skin is warm and dry.   No obvious bruising   Psych normal mood and behavior       LAB RESULTS  Lab Results (last 24 hours)     Procedure Component Value Units Date/Time    CBC & Differential [[id number]] Collected:  08/24/17 2145    Specimen:  Blood Updated:  08/24/17 2156    Narrative:       The following orders were " created for panel order CBC & Differential.  Procedure                               Abnormality         Status                     ---------                               -----------         ------                     CBC Auto Differential[[id number]]        Abnormal            Final result                 Please view results for these tests on the individual orders.    CBC Auto Differential [[id number]]  (Abnormal) Collected:  08/24/17 2145    Specimen:  Blood Updated:  08/24/17 2156     WBC 11.70 (H) 10*3/mm3      RBC 4.80 10*6/mm3      Hemoglobin 13.4 g/dL      Hematocrit 41.8 %      MCV 87.1 fL      MCH 27.9 pg      MCHC 32.1 (L) g/dL      RDW 13.7 (H) %      RDW-SD 43.5 fl      MPV 11.7 fL      Platelets 201 10*3/mm3      Neutrophil % 84.1 (H) %      Lymphocyte % 7.3 (L) %      Monocyte % 7.9 %      Eosinophil % 0.3 %      Basophil % 0.1 %      Immature Grans % 0.3 %      Neutrophils, Absolute 9.84 (H) 10*3/mm3      Lymphocytes, Absolute 0.85 (L) 10*3/mm3      Monocytes, Absolute 0.93 10*3/mm3      Eosinophils, Absolute 0.04 10*3/mm3      Basophils, Absolute 0.01 10*3/mm3      Immature Grans, Absolute 0.03 10*3/mm3     POC Glucose Fingerstick [[id number]]  (Normal) Collected:  08/24/17 2157    Specimen:  Blood Updated:  08/24/17 2159     Glucose 103 mg/dL     Narrative:       M[id number] : [id number]    Magnesium [[id number]]  (Normal) Collected:  08/24/17 2145    Specimen:  Blood Updated:  08/24/17 2215     Magnesium 2.2 mg/dL     Troponin [[id number]]  (Normal) Collected:  08/24/17 2145    Specimen:  Blood Updated:  08/24/17 2215     Troponin T <0.010 ng/mL     Narrative:       Troponin T Reference Ranges:  Less than 0.03 ng/mL:    Negative for AMI  0.03 to 0.09 ng/mL:      Indeterminant for AMI  Greater than 0.09 ng/mL: Positive for AMI    Comprehensive Metabolic Panel [[id number]]  (Abnormal) Collected:  08/24/17 2145    Specimen:  Blood Updated:  08/24/17 221     Glucose 139 (H)  mg/dL      BUN 27 (H) mg/dL      Creatinine 1.03 (H) mg/dL      Sodium 142 mmol/L      Potassium 4.9 mmol/L      Chloride 102 mmol/L      CO2 25.3 mmol/L      Calcium 9.4 mg/dL      Total Protein 7.0 g/dL      Albumin 4.10 g/dL      ALT (SGPT) 13 U/L      AST (SGOT) 12 U/L      Alkaline Phosphatase 83 U/L      Total Bilirubin 0.4 mg/dL      eGFR Non African Amer 53 (L) mL/min/1.73      Globulin 2.9 gm/dL      A/G Ratio 1.4 g/dL      BUN/Creatinine Ratio 26.2 (H)     Anion Gap 14.7 mmol/L     Protime-INR [[id number]]  (Normal) Collected:  08/24/17 2145    Specimen:  Blood Updated:  08/24/17 2250     Protime 12.8 Seconds      INR 1.00    [place] Draw [[id number]] Collected:  08/24/17 2145    Specimen:  Blood Updated:  08/24/17 2301    Narrative:       The following orders were created for panel order [place] Draw.  Procedure                               Abnormality         Status                     ---------                               -----------         ------                     Light Blue Top[[id number]]                                   Final result               Green Top (Gel)[[id number]]                                  Final result               Lavender Top[[id number]]                                     Final result               Gold Top - SST[[id number]]                                   Final result                 Please view results for these tests on the individual orders.    Light Blue Top [[id number]] Collected:  08/24/17 2145    Specimen:  Blood Updated:  08/24/17 2301     Extra Tube hold for add-on      Auto resulted       Green Top (Gel) [[id number]] Collected:  08/24/17 2145    Specimen:  Blood Updated:  08/24/17 2301     Extra Tube Hold for add-ons.      Auto resulted.       Lavender Top [[id number]] Collected:  08/24/17 2145    Specimen:  Blood Updated:  08/24/17 2301     Extra Tube hold for add-on      Auto resulted       Gold Top - SST [[id number]] Collected:  08/24/17 2145    Specimen:  Blood  Updated:  08/24/17 2301     Extra Tube Hold for add-ons.      Auto resulted.           Imaging Results (last 72 hours)     Procedure Component Value Units Date/Time    CT Head Without Contrast [[id number]] Collected:  08/24/17 2247     Updated:  08/24/17 2247    Narrative:       CT SCAN OF THE BRAIN WITHOUT CONTRAST.     TECHNIQUE: Multiple axial images of the brain were obtained from the  vertex to the base of the brain.     HISTORY:  Trauma.     COMPARISON:  No prior studies for comparison.     FINDINGS:   Midline structures are within normal limits, there is no hydrocephalus.  Gray-white matter differentiation is maintained. Small vessel ischemic  disease and cortical atrophy related changes. Punctate calcification in  the left basal ganglia.     Orbits are within normal limits. There is small amount of fluid in both  sphenoid sinuses. Mastoid air cells are well aerated.             Impression:       1.  No definite acute intracranial pathology.   2.  Fluid in both sphenoid sinuses may represent sinusitis.             XR Spine Lumbar 4+ View [[id number]] Collected:  08/24/17 2321     Updated:  08/24/17 2321    Narrative:       X-RAY CHEST 1 VIEW.     HISTORY: Evaluate for trauma.     COMPARISON: No prior studies for comparison.     FINDINGS:  Cardiomediastinal silhouette is within normal limits.         There is no consolidation or effusion. Chronic lung changes are present.          Impression:       No acute findings.         ----------------------------------------------------------------     X-RAY LUMBAR SPINE 4 VIEWS.     HISTORY: Trauma, back pain.     COMPARISON: No prior studies for comparison.     FINDINGS:  Vertebral body height is normal, no acute fracture.  Straightening of  the lordotic curve may be due to muscle spasm. Multilevel mild spurring  and loss of intervertebral disc height, mild facet joint disease.     Severe atherosclerotic disease of the aorta.      IMPRESSION:  No fracture or subluxation  of the lumbar spine.              XR Chest 1 View [[id number]] Collected:  08/24/17 2321     Updated:  08/24/17 2321    Narrative:       X-RAY CHEST 1 VIEW.     HISTORY: Evaluate for trauma.     COMPARISON: No prior studies for comparison.     FINDINGS:  Cardiomediastinal silhouette is within normal limits.         There is no consolidation or effusion. Chronic lung changes are present.          Impression:       No acute findings.         ----------------------------------------------------------------     X-RAY LUMBAR SPINE 4 VIEWS.     HISTORY: Trauma, back pain.     COMPARISON: No prior studies for comparison.     FINDINGS:  Vertebral body height is normal, no acute fracture.  Straightening of  the lordotic curve may be due to muscle spasm. Multilevel mild spurring  and loss of intervertebral disc height, mild facet joint disease.     Severe atherosclerotic disease of the aorta.      IMPRESSION:  No fracture or subluxation of the lumbar spine.               EKG          EKG time: 2150   Rhythm/Rate: sinus rhythm, rate: 70s  Normal P waves and normal OR interval   Normal QRS, Normal axis  Normal ST and T waves      Current Facility-Administered Medications:   •  acetaminophen (TYLENOL) tablet 650 mg, 650 mg, Oral, Q4H PRN, Perry Frances MD, 650 mg at 08/25/17 0344  •  amLODIPine (NORVASC) tablet 10 mg, 10 mg, Oral, Daily, Perry Frances MD  •  aspirin chewable tablet 81 mg, 81 mg, Oral, Daily, Perry Frances MD  •  atorvastatin (LIPITOR) tablet 10 mg, 10 mg, Oral, Daily, Perry Frances MD  •  clopidogrel (PLAVIX) tablet 75 mg, 75 mg, Oral, Daily, Perry Frances MD  •  ondansetron (ZOFRAN) injection 4 mg, 4 mg, Intravenous, Q4H PRN, Perry Frances MD, 4 mg at 08/25/17 0344  •  pantoprazole (PROTONIX) EC tablet 40 mg, 40 mg, Oral, Q AM, Perry Frances MD  •  sodium chloride 0.9 % flush 10 mL, 10 mL, Intravenous, PRN, Debbie L Jennifer, MD  •  Insert peripheral IV, , , Once **AND** sodium chloride 0.9 % flush 10 mL, 10 mL, Intravenous,  PRN, Debbie Rodriguez MD  •  sodium chloride 0.9 % with KCl 20 mEq/L infusion, 125 mL/hr, Intravenous, Continuous, Nadir Frances MD, Last Rate: 75 mL/hr at 08/25/17 0807, 75 mL/hr at 08/25/17 0807  •  valsartan (DIOVAN) tablet 40 mg, 40 mg, Oral, Daily, Nadir Frances MD  •  Vitamin D3 2,000 Units, 2,000 Units, Oral, Daily, Nadir Frances MD  •  zolpidem (AMBIEN) tablet 10 mg, 10 mg, Oral, Nightly PRN, Nadir Frances MD     ASSESSMENT  Syncopal episode probably vasovagal need to rule out TIA or seizure disorder  Hypertension  Hyperlipidemia  Gastroesophageal reflux disease  Dehydration  Diarrhea  Low back pain with lumbar strain  Status post fall    PLAN  Admit  Supportive care  IV fluid  Aspirin Plavix and Lipitor  Neuro consult  MRI 2-D echo carotid Doppler  Check EEG  Neuro checks every 4 hours  Stress ulcer DVT prophylaxis  PTOT  Check stool for C. difficile toxin  Follow closely further recommendation according to hospital course    NADIR FRANCES MD

## 2017-08-26 LAB
ALBUMIN SERPL-MCNC: 2.9 G/DL (ref 3.5–5.2)
ALBUMIN/GLOB SERPL: 1.1 G/DL
ALP SERPL-CCNC: 67 U/L (ref 39–117)
ALT SERPL W P-5'-P-CCNC: 9 U/L (ref 1–33)
ANION GAP SERPL CALCULATED.3IONS-SCNC: 13.8 MMOL/L
AST SERPL-CCNC: 12 U/L (ref 1–32)
BASOPHILS # BLD AUTO: 0.01 10*3/MM3 (ref 0–0.2)
BASOPHILS NFR BLD AUTO: 0.3 % (ref 0–1.5)
BH CV ECHO MEAS - ACS: 2 CM
BH CV ECHO MEAS - AO MAX PG: 10 MMHG
BH CV ECHO MEAS - AO MEAN PG (FULL): 3 MMHG
BH CV ECHO MEAS - AO MEAN PG: 6 MMHG
BH CV ECHO MEAS - AO ROOT AREA (BSA CORRECTED): 1.7
BH CV ECHO MEAS - AO ROOT AREA: 9.1 CM^2
BH CV ECHO MEAS - AO ROOT DIAM: 3.4 CM
BH CV ECHO MEAS - AO V2 MAX: 156 CM/SEC
BH CV ECHO MEAS - AO V2 MEAN: 112 CM/SEC
BH CV ECHO MEAS - AO V2 VTI: 35.7 CM
BH CV ECHO MEAS - AVA(I,A): 2.6 CM^2
BH CV ECHO MEAS - AVA(I,D): 2.6 CM^2
BH CV ECHO MEAS - BSA(HAYCOCK): 2.1 M^2
BH CV ECHO MEAS - BSA: 2 M^2
BH CV ECHO MEAS - BZI_BMI: 35.5 KILOGRAMS/M^2
BH CV ECHO MEAS - BZI_METRIC_HEIGHT: 162.6 CM
BH CV ECHO MEAS - BZI_METRIC_WEIGHT: 93.9 KG
BH CV ECHO MEAS - CONTRAST EF (2CH): 87.3 ML/M^2
BH CV ECHO MEAS - CONTRAST EF 4CH: 84.8 ML/M^2
BH CV ECHO MEAS - EDV(CUBED): 59.3 ML
BH CV ECHO MEAS - EDV(MOD-SP2): 118 ML
BH CV ECHO MEAS - EDV(MOD-SP4): 79 ML
BH CV ECHO MEAS - EDV(TEICH): 65.9 ML
BH CV ECHO MEAS - EF(CUBED): 86.5 %
BH CV ECHO MEAS - EF(MOD-SP2): 87.3 %
BH CV ECHO MEAS - EF(MOD-SP4): 84.8 %
BH CV ECHO MEAS - EF(TEICH): 80.7 %
BH CV ECHO MEAS - ESV(CUBED): 8 ML
BH CV ECHO MEAS - ESV(MOD-SP2): 15 ML
BH CV ECHO MEAS - ESV(MOD-SP4): 12 ML
BH CV ECHO MEAS - ESV(TEICH): 12.7 ML
BH CV ECHO MEAS - FS: 48.7 %
BH CV ECHO MEAS - IVS/LVPW: 1.2
BH CV ECHO MEAS - IVSD: 1.3 CM
BH CV ECHO MEAS - LAT PEAK E' VEL: 10 CM/SEC
BH CV ECHO MEAS - LV DIASTOLIC VOL/BSA (35-75): 39.8 ML/M^2
BH CV ECHO MEAS - LV MASS(C)D: 159.3 GRAMS
BH CV ECHO MEAS - LV MASS(C)DI: 80.3 GRAMS/M^2
BH CV ECHO MEAS - LV MEAN PG: 3 MMHG
BH CV ECHO MEAS - LV SYSTOLIC VOL/BSA (12-30): 6 ML/M^2
BH CV ECHO MEAS - LV V1 MEAN: 81.2 CM/SEC
BH CV ECHO MEAS - LV V1 VTI: 30 CM
BH CV ECHO MEAS - LVIDD: 3.9 CM
BH CV ECHO MEAS - LVIDS: 2 CM
BH CV ECHO MEAS - LVLD AP2: 8.1 CM
BH CV ECHO MEAS - LVLD AP4: 8.1 CM
BH CV ECHO MEAS - LVLS AP2: 6 CM
BH CV ECHO MEAS - LVLS AP4: 5.9 CM
BH CV ECHO MEAS - LVOT AREA (M): 3.1 CM^2
BH CV ECHO MEAS - LVOT AREA: 3.1 CM^2
BH CV ECHO MEAS - LVOT DIAM: 2 CM
BH CV ECHO MEAS - LVPWD: 1.1 CM
BH CV ECHO MEAS - MED PEAK E' VEL: 7 CM/SEC
BH CV ECHO MEAS - MR MAX PG: 71.9 MMHG
BH CV ECHO MEAS - MR MAX VEL: 419 CM/SEC
BH CV ECHO MEAS - MV A DUR: 141 SEC
BH CV ECHO MEAS - MV A MAX VEL: 93.3 CM/SEC
BH CV ECHO MEAS - MV DEC SLOPE: 378 CM/SEC^2
BH CV ECHO MEAS - MV DEC TIME: 180 SEC
BH CV ECHO MEAS - MV E MAX VEL: 79 CM/SEC
BH CV ECHO MEAS - MV E/A: 0.85
BH CV ECHO MEAS - MV MEAN PG: 2 MMHG
BH CV ECHO MEAS - MV P1/2T MAX VEL: 103 CM/SEC
BH CV ECHO MEAS - MV P1/2T: 79.8 MSEC
BH CV ECHO MEAS - MV V2 MEAN: 72.9 CM/SEC
BH CV ECHO MEAS - MV V2 VTI: 32.8 CM
BH CV ECHO MEAS - MVA P1/2T LCG: 2.1 CM^2
BH CV ECHO MEAS - MVA(P1/2T): 2.8 CM^2
BH CV ECHO MEAS - MVA(VTI): 2.9 CM^2
BH CV ECHO MEAS - PA ACC SLOPE: 1157 CM/SEC^2
BH CV ECHO MEAS - PA ACC TIME: 0.12 SEC
BH CV ECHO MEAS - PA MAX PG: 7.4 MMHG
BH CV ECHO MEAS - PA PR(ACCEL): 26.8 MMHG
BH CV ECHO MEAS - PA V2 MAX: 136 CM/SEC
BH CV ECHO MEAS - PULM A REVS DUR: 158 SEC
BH CV ECHO MEAS - PULM A REVS VEL: 33.3 CM/SEC
BH CV ECHO MEAS - PULM DIAS VEL: 52.8 CM/SEC
BH CV ECHO MEAS - PULM S/D: 1.1
BH CV ECHO MEAS - PULM SYS VEL: 56.7 CM/SEC
BH CV ECHO MEAS - QP/QS: 0.82
BH CV ECHO MEAS - RAP SYSTOLE: 3 MMHG
BH CV ECHO MEAS - RV MEAN PG: 2 MMHG
BH CV ECHO MEAS - RV V1 MEAN: 64.3 CM/SEC
BH CV ECHO MEAS - RV V1 VTI: 22.4 CM
BH CV ECHO MEAS - RVOT AREA: 3.5 CM^2
BH CV ECHO MEAS - RVOT DIAM: 2.1 CM
BH CV ECHO MEAS - RVSP: 33.7 MMHG
BH CV ECHO MEAS - SI(AO): 163.3 ML/M^2
BH CV ECHO MEAS - SI(CUBED): 25.9 ML/M^2
BH CV ECHO MEAS - SI(LVOT): 47.5 ML/M^2
BH CV ECHO MEAS - SI(MOD-SP2): 51.9 ML/M^2
BH CV ECHO MEAS - SI(MOD-SP4): 33.8 ML/M^2
BH CV ECHO MEAS - SI(TEICH): 26.8 ML/M^2
BH CV ECHO MEAS - SV(AO): 324.1 ML
BH CV ECHO MEAS - SV(CUBED): 51.3 ML
BH CV ECHO MEAS - SV(LVOT): 94.2 ML
BH CV ECHO MEAS - SV(MOD-SP2): 103 ML
BH CV ECHO MEAS - SV(MOD-SP4): 67 ML
BH CV ECHO MEAS - SV(RVOT): 77.6 ML
BH CV ECHO MEAS - SV(TEICH): 53.2 ML
BH CV ECHO MEAS - TAPSE (>1.6): 2.3 CM2
BH CV ECHO MEAS - TR MAX VEL: 277 CM/SEC
BH CV VAS BP RIGHT ARM: NORMAL MMHG
BH CV XLRA - RV BASE: 4 CM
BH CV XLRA - TDI S': 14 CM/SEC
BILIRUB SERPL-MCNC: 0.2 MG/DL (ref 0.1–1.2)
BUN BLD-MCNC: 16 MG/DL (ref 8–23)
BUN/CREAT SERPL: 26.7 (ref 7–25)
C DIFF TOX GENS STL QL NAA+PROBE: NEGATIVE
CALCIUM SPEC-SCNC: 8.1 MG/DL (ref 8.6–10.5)
CHLORIDE SERPL-SCNC: 111 MMOL/L (ref 98–107)
CHOLEST SERPL-MCNC: 77 MG/DL (ref 0–200)
CO2 SERPL-SCNC: 18.2 MMOL/L (ref 22–29)
CREAT BLD-MCNC: 0.6 MG/DL (ref 0.57–1)
DEPRECATED RDW RBC AUTO: 47.1 FL (ref 37–54)
E/E' RATIO: 10
EOSINOPHIL # BLD AUTO: 0.04 10*3/MM3 (ref 0–0.7)
EOSINOPHIL NFR BLD AUTO: 1.1 % (ref 0.3–6.2)
ERYTHROCYTE [DISTWIDTH] IN BLOOD BY AUTOMATED COUNT: 14.3 % (ref 11.7–13)
GFR SERPL CREATININE-BSD FRML MDRD: 99 ML/MIN/1.73
GLOBULIN UR ELPH-MCNC: 2.7 GM/DL
GLUCOSE BLD-MCNC: 104 MG/DL (ref 65–99)
HBA1C MFR BLD: 5.76 % (ref 4.8–5.6)
HCT VFR BLD AUTO: 39.1 % (ref 35.6–45.5)
HDLC SERPL-MCNC: 29 MG/DL (ref 40–60)
HGB BLD-MCNC: 11.8 G/DL (ref 11.9–15.5)
IMM GRANULOCYTES # BLD: 0 10*3/MM3 (ref 0–0.03)
IMM GRANULOCYTES NFR BLD: 0 % (ref 0–0.5)
LDLC SERPL CALC-MCNC: 26 MG/DL (ref 0–100)
LDLC/HDLC SERPL: 0.91 {RATIO}
LEFT ATRIUM VOLUME INDEX: 19 ML/M2
LYMPHOCYTES # BLD AUTO: 1.61 10*3/MM3 (ref 0.9–4.8)
LYMPHOCYTES NFR BLD AUTO: 43 % (ref 19.6–45.3)
MCH RBC QN AUTO: 27 PG (ref 26.9–32)
MCHC RBC AUTO-ENTMCNC: 30.2 G/DL (ref 32.4–36.3)
MCV RBC AUTO: 89.5 FL (ref 80.5–98.2)
MONOCYTES # BLD AUTO: 0.64 10*3/MM3 (ref 0.2–1.2)
MONOCYTES NFR BLD AUTO: 17.1 % (ref 5–12)
NEUTROPHILS # BLD AUTO: 1.44 10*3/MM3 (ref 1.9–8.1)
NEUTROPHILS NFR BLD AUTO: 38.5 % (ref 42.7–76)
NT-PROBNP SERPL-MCNC: 197.3 PG/ML (ref 5–900)
PLATELET # BLD AUTO: 158 10*3/MM3 (ref 140–500)
PMV BLD AUTO: 11.7 FL (ref 6–12)
POTASSIUM BLD-SCNC: 4.4 MMOL/L (ref 3.5–5.2)
PROT SERPL-MCNC: 5.6 G/DL (ref 6–8.5)
RBC # BLD AUTO: 4.37 10*6/MM3 (ref 3.9–5.2)
SODIUM BLD-SCNC: 143 MMOL/L (ref 136–145)
TRIGL SERPL-MCNC: 108 MG/DL (ref 0–150)
TSH SERPL DL<=0.05 MIU/L-ACNC: 0.59 MIU/ML (ref 0.27–4.2)
VLDLC SERPL-MCNC: 21.6 MG/DL (ref 5–40)
WBC NRBC COR # BLD: 3.74 10*3/MM3 (ref 4.5–10.7)

## 2017-08-26 PROCEDURE — 83036 HEMOGLOBIN GLYCOSYLATED A1C: CPT | Performed by: HOSPITALIST

## 2017-08-26 PROCEDURE — 87493 C DIFF AMPLIFIED PROBE: CPT | Performed by: HOSPITALIST

## 2017-08-26 PROCEDURE — 96361 HYDRATE IV INFUSION ADD-ON: CPT

## 2017-08-26 PROCEDURE — G0378 HOSPITAL OBSERVATION PER HR: HCPCS

## 2017-08-26 PROCEDURE — G8987 SELF CARE CURRENT STATUS: HCPCS

## 2017-08-26 PROCEDURE — G8979 MOBILITY GOAL STATUS: HCPCS

## 2017-08-26 PROCEDURE — G8989 SELF CARE D/C STATUS: HCPCS

## 2017-08-26 PROCEDURE — 99213 OFFICE O/P EST LOW 20 MIN: CPT | Performed by: PSYCHIATRY & NEUROLOGY

## 2017-08-26 PROCEDURE — 97161 PT EVAL LOW COMPLEX 20 MIN: CPT

## 2017-08-26 PROCEDURE — 85025 COMPLETE CBC W/AUTO DIFF WBC: CPT | Performed by: HOSPITALIST

## 2017-08-26 PROCEDURE — G8978 MOBILITY CURRENT STATUS: HCPCS

## 2017-08-26 PROCEDURE — G8980 MOBILITY D/C STATUS: HCPCS

## 2017-08-26 PROCEDURE — 84443 ASSAY THYROID STIM HORMONE: CPT | Performed by: HOSPITALIST

## 2017-08-26 PROCEDURE — 83880 ASSAY OF NATRIURETIC PEPTIDE: CPT | Performed by: HOSPITALIST

## 2017-08-26 PROCEDURE — 80061 LIPID PANEL: CPT | Performed by: HOSPITALIST

## 2017-08-26 PROCEDURE — 25810000003 SODIUM CHLORIDE 0.9 % WITH KCL 20 MEQ 20-0.9 MEQ/L-% SOLUTION: Performed by: HOSPITALIST

## 2017-08-26 PROCEDURE — G8988 SELF CARE GOAL STATUS: HCPCS

## 2017-08-26 PROCEDURE — 80053 COMPREHEN METABOLIC PANEL: CPT | Performed by: HOSPITALIST

## 2017-08-26 PROCEDURE — 97165 OT EVAL LOW COMPLEX 30 MIN: CPT

## 2017-08-26 RX ORDER — ASPIRIN 81 MG/1
81 TABLET, CHEWABLE ORAL DAILY
Status: DISCONTINUED | OUTPATIENT
Start: 2017-08-27 | End: 2017-08-27 | Stop reason: HOSPADM

## 2017-08-26 RX ORDER — AMLODIPINE BESYLATE 10 MG/1
10 TABLET ORAL DAILY
Status: DISCONTINUED | OUTPATIENT
Start: 2017-08-27 | End: 2017-08-27 | Stop reason: HOSPADM

## 2017-08-26 RX ORDER — DIPHENOXYLATE HYDROCHLORIDE AND ATROPINE SULFATE 2.5; .025 MG/1; MG/1
1 TABLET ORAL
Status: DISCONTINUED | OUTPATIENT
Start: 2017-08-26 | End: 2017-08-27

## 2017-08-26 RX ORDER — ASPIRIN 81 MG/1
81 TABLET, CHEWABLE ORAL NIGHTLY
Status: DISCONTINUED | OUTPATIENT
Start: 2017-08-26 | End: 2017-08-26 | Stop reason: SDUPTHER

## 2017-08-26 RX ORDER — LORAZEPAM 2 MG/ML
1 INJECTION INTRAMUSCULAR ONCE
Status: DISCONTINUED | OUTPATIENT
Start: 2017-08-26 | End: 2017-08-26

## 2017-08-26 RX ORDER — PANTOPRAZOLE SODIUM 40 MG/1
40 TABLET, DELAYED RELEASE ORAL
Status: DISCONTINUED | OUTPATIENT
Start: 2017-08-26 | End: 2017-08-27 | Stop reason: HOSPADM

## 2017-08-26 RX ADMIN — VALSARTAN 40 MG: 40 TABLET, FILM COATED ORAL at 09:17

## 2017-08-26 RX ADMIN — CLOPIDOGREL BISULFATE 75 MG: 75 TABLET ORAL at 09:17

## 2017-08-26 RX ADMIN — POTASSIUM CHLORIDE AND SODIUM CHLORIDE 125 ML/HR: 900; 150 INJECTION, SOLUTION INTRAVENOUS at 00:57

## 2017-08-26 RX ADMIN — POTASSIUM CHLORIDE AND SODIUM CHLORIDE 125 ML/HR: 900; 150 INJECTION, SOLUTION INTRAVENOUS at 09:15

## 2017-08-26 RX ADMIN — POTASSIUM CHLORIDE AND SODIUM CHLORIDE 75 ML/HR: 900; 150 INJECTION, SOLUTION INTRAVENOUS at 17:47

## 2017-08-26 RX ADMIN — ZOLPIDEM TARTRATE 10 MG: 5 TABLET, FILM COATED ORAL at 22:59

## 2017-08-26 RX ADMIN — VITAMIN D, TAB 1000IU (100/BT) 2000 UNITS: 25 TAB at 20:33

## 2017-08-26 RX ADMIN — DIPHENOXYLATE HYDROCHLORIDE AND ATROPINE SULFATE 1 TABLET: 2.5; .025 TABLET ORAL at 09:14

## 2017-08-26 RX ADMIN — DIPHENOXYLATE HYDROCHLORIDE AND ATROPINE SULFATE 1 TABLET: 2.5; .025 TABLET ORAL at 03:45

## 2017-08-26 RX ADMIN — ACETAMINOPHEN 650 MG: 325 TABLET ORAL at 20:42

## 2017-08-26 RX ADMIN — PANTOPRAZOLE SODIUM 40 MG: 40 TABLET, DELAYED RELEASE ORAL at 09:14

## 2017-08-26 RX ADMIN — ATORVASTATIN CALCIUM 10 MG: 20 TABLET, FILM COATED ORAL at 20:32

## 2017-08-26 NOTE — NURSING NOTE
Pt returned to the unit on a stretcher. She is lethargic and unable to stay awake. After speaking with bedboard, she was placed in 560 and transferred from the stretcher to the bed. Vital signs obtained and O2 89-91% on RA. Pt placed on 1.5L O2 per NC. Now 93%. Other VS WNL.. Will continue to assess

## 2017-08-26 NOTE — THERAPY DISCHARGE NOTE
Acute Care - Occupational Therapy Initial Eval/Discharge  [facility]     Patient Name: Mag Silva  : 1945  MRN: [id number]  Today's Date: 2017        Referring Physician: Juan Daniel      Admit Date: 2017       ICD-10-CM ICD-9-CM   1. Syncope and collapse R55 780.2   2. Head injury, initial encounter S09.90XA 959.01   3. Lumbar strain, initial encounter S39.012A 847.2     Patient Active Problem List   Diagnosis   • Coronary arteriosclerosis in native artery   • Benign essential hypertension   • Chronic coronary artery disease   • Chest pain   • Chronic obstructive pulmonary disease   • Dyslipidemia   • Ventricular premature beats   • Syncope   • Hyperlipidemia     Past Medical History:   Diagnosis Date   • Chest pain    • COPD (chronic obstructive pulmonary disease)    • Coronary artery disease    • Crescendo angina    • Dyslipidemia    • Hypertension    • PVC (premature ventricular contraction)    • Syncope      Past Surgical History:   Procedure Laterality Date   • BACK SURGERY     • CARDIAC CATHETERIZATION     • CAROTID STENT     • HYSTERECTOMY     • NECK SURGERY            OT ASSESSMENT FLOWSHEET (last 72 hours)      OT Evaluation       17 0926 17 0919 17 1454 17 1237 17 1045    Rehab Evaluation    Document Type  evaluation;discharge summary  -SG       Subjective Information  agree to therapy  -SG       Evaluation Not Performed   patient unavailable for evaluation   vascular 1454  -SG patient unavailable for evaluation   off the floor to EEG. Will follow up tomorrow.  -KH     General Information    Patient Profile Review  yes  -SG       Referring Physician  Juan Daniel  -SONG       General Observations  walking out of bathroom to bed  -SG       Precautions/Limitations  fall precautions  -SG       Prior Level of Function  independent:;ADL's  -SG       Equipment Currently Used at Home  none  -SG   none  -TRAVIS    Plans/Goals Discussed With  patient;agreed upon  -SG        Living Environment    Lives With  alone  -   alone  -[facility]    Living Arrangements     house  -[facility]    Home Accessibility     no concerns;bed and bath on same level  -[facility]    Stair Railings at Home     outside, present at both sides  -[facility]    Type of Financial/Environmental Concern     none  -[facility]    Transportation Available     car  -[facility]    Clinical Impression    OT Diagnosis  need for assist with personal care  -       Patient/Family Goals Statement  to return home with interm. assist from dgt if needed  -SG       Anticipated Discharge Disposition home with assist  -SG        Vision Assessment/Intervention    Visual Impairment  WFL  -SG       Cognitive Assessment/Intervention    Current Cognitive/Communication Assessment  functional  -SG       Orientation Status  oriented x 4  -SG       Follows Commands/Answers Questions  100% of the time;able to follow multi-step instructions  -SG       ROM (Range of Motion)    General ROM Detail  BUE's WFL AROM  -SG       Bed Mobility, Assessment/Treatment    Bed Mob, Sit to Supine, [place]  supervision required  -SG       Functional Mobility    Functional Mobility- Ind. Level  supervision required  -SG       Functional Mobility- Comment  observed walking in room from bathroom. Nsg present  -SG       Lower Body Dressing Assessment/Training    LB Dressing Assess/Train, [place]  --   able to bring legs up for LE dress  -SG       LB Dressing Assess/Train, Comment  discussed LE dress and AE to assist with safety. Pt states has reacher  -SG       Grooming Assessment/Training    Grooming Assess/Train, Position  standing  -SG       Grooming Assess/Train, Indepen Level  supervision required  -SG       Sensory Assessment/Intervention    Light Touch  LUE;RUE  -SG       LUE Light Touch  WNL  -SG       RUE Light Touch  WNL  -SG       Positioning and Restraints    Pre-Treatment Position  standing in room  -SG       Post Treatment Position  bed  -SG       In Bed  supine;call light  within reach;encouraged to call for assist;exit alarm on;with nsg  -SG         08/25/17 1043 08/25/17 0245 08/25/17 0242          General Information    Equipment Currently Used at Home   none  -MT      Living Environment    Lives With  alone  -MT       Living Arrangements  house  -MT       Home Accessibility  no concerns;bed and bath on same level  -MT       Stair Railings at Home  outside, present at both sides  -MT       Type of Financial/Environmental Concern  none  -MT       Transportation Available  car  -MT       Functional Level Prior    Ambulation 0-->independent  -TRAVIS  0-->independent  -MT      Transferring 0-->independent  -TRAVIS  0-->independent  -MT      Toileting 0-->independent  -TRAVIS  0-->independent  -MT      Bathing 0-->independent  -TRAVIS  0-->independent  -MT      Dressing 0-->independent  -TRAVIS  0-->independent  -MT      Eating 0-->independent  -TRAVIS  0-->independent  -MT      Communication 0-->understands/communicates without difficulty  -TRAVIS  0-->understands/communicates without difficulty  -MT      Swallowing 0-->swallows foods/liquids without difficulty  -TRAVIS  0-->swallows foods/liquids without difficulty  -MT        User Key  (r) = Recorded By, (t) = Taken By, (c) = Cosigned By    Initials Name Effective Dates     Katherine Funez, PT 05/18/15 -      Jenna Shea, OTR 04/13/15 -     TRAVIS Anne RN 02/18/16 -     MT Martha Carrera RN 06/16/16 -           Occupational Therapy Education     Title: PT OT SLP Therapies (Done)     Topic: Occupational Therapy (Done)     Point: ADL training (Done)    Description: Instruct learner(s) on proper safety adaptation and remediation techniques during self care or transfers.   Instruct in proper use of assistive devices.    Learning Progress Summary    Learner Readiness Method Response Comment Documented by Status   Patient Acceptance E,TB,D DEMARCUS   08/26/17 5787 Done               Point: Body mechanics (Done)    Description: Instruct  learner(s) on proper positioning and spine alignment during self-care, functional mobility activities and/or exercises.    Learning Progress Summary    Learner Readiness Method Response Comment Documented by Status   Patient Acceptance E,TB,D VU   08/26/17 0925 Done                      User Key     Initials Effective Dates Name Provider Type [facility] 04/13/15 -  GAVI Croft Occupational Therapist OT                OT Recommendation and Plan  Anticipated Discharge Disposition: home with assist              OT Goals       08/26/17 0925          Patient Education OT LTG    Patient Education OT LTG, Date Established 08/26/17  -      Patient Education OT LTG, Time to Achieve 1 day  -      Patient Education OT LTG, Education Type home safety  -      Patient Education OT LTG, Education Understanding verbalizes understanding  -      Patient Education OT LTG Outcome goal met  -        User Key  (r) = Recorded By, (t) = Taken By, (c) = Cosigned By    Initials Name Provider Type    GAVI Sanchez Occupational Therapist                Outcome Measures       08/26/17 0926          How much help from another is currently needed...    Putting on and taking off regular lower body clothing? 3  -SG      Bathing (including washing, rinsing, and drying) 3  -SG      Toileting (which includes using toilet bed pan or urinal) 3  -SG      Putting on and taking off regular upper body clothing 3  -SG      Taking care of personal grooming (such as brushing teeth) 3  -SG      Eating meals 4  -SG      Score 19  -      Functional Assessment    Outcome Measure Options AM-PAC 6 Clicks Daily Activity (OT)  -        User Key  (r) = Recorded By, (t) = Taken By, (c) = Cosigned By    Initials Name Provider Type    GAVI Sanchez Occupational Therapist          Time Calculation:         Time Calculation- OT       08/26/17 0926          Time Calculation- OT    OT Start Time 0852  -SG      OT Stop Time  0910  -      OT Time Calculation (min) 18 min  -      OT Received On 08/26/17  -        User Key  (r) = Recorded By, (t) = Taken By, (c) = Cosigned By    Initials Name Provider Type     GAVI Croft Occupational Therapist          Therapy Charges for Today     Code Description Service Date Service Provider Modifiers Qty    84877868441 HC OT SELFCARE CURRENT 8/26/2017 GAVI Croft CK 1    37091322626 HC OT SELFCARE PROJECTED 8/26/2017 GAVI Croft CK 1    44668936006 HC OT SELFCARE DISCHARGE 8/26/2017 GAVI Croft CK 1    78237988161 HC OT EVAL LOW COMPLEXITY 2 8/26/2017 GAVI Croft GO 1          OT G-codes  OT Functional Scales Options: AM-PAC 6 Clicks Daily Activity (OT)  Functional Limitation: Self care  Self Care Current Status (): At least 40 percent but less than 60 percent impaired, limited or restricted  Self Care Goal Status (): At least 40 percent but less than 60 percent impaired, limited or restricted  Self Care Discharge Status (): At least 40 percent but less than 60 percent impaired, limited or restricted    OT Discharge Summary  Anticipated Discharge Disposition: home with assist  Reason for Discharge: other (comment) (denies need for further OT at this time)    GAVI Croft  8/26/2017

## 2017-08-26 NOTE — PROGRESS NOTES
"Progress Note      PATIENT Mag Silva    1945   MRN [id number]   ADMIT DATE 2017   LENGTH OF STAY 0 days   ATTENDING Perry Frances MD       CHIEF COMPLAINT: Syncope      DIAGNOSIS: Syncope and collapse [R55]  Syncope [R55]  Lumbar strain, initial encounter [S39.012A]  Head injury, initial encounter [S09.90XA]    HISTORY OF PRESENT ILLNESS: Patient's had no further loss of consciousness episodes since my last note.  He describes a tingling feeling in the occipital lobe bilaterally.  Otherwise she is presently asymptomatic.    PHYSICAL EXAMINATION:  GENERAL: Reveals a man/woman who appears his/her stated age, in no acute distress.  VITAL SIGNS: /81 (BP Location: Right arm, Patient Position: Lying)  Pulse 66  Temp 98.7 °F (37.1 °C) (Oral)   Resp 16  Ht 64\" (162.6 cm)  Wt 207 lb 8 oz (94.1 kg)  SpO2 93%  BMI 35.62 kg/m2  NECK: Carotids are equal without bruits.   HEART: Regular rate and rhythm with no significant murmur or gallop.   EXTREMITIES: Nonedematous. Pulses are intact. There is no rash. There is no hepatosplenomegaly.   NEUROLOGIC: He is awake and alert. He is oriented x3. There is no aphasia. Visual fields are full. Cranial nerves are intact. Power is normal in all 4 extremities. Toes are downgoing.      DIAGNOSTIC DATA: I reviewed the EEG.  The official reading is pending but I see no epileptiform activity.    MRI of the brain which shows no significant abnormalities.  The very small (2 mm) spots of enhancement in the left frontal lobe.  It's not seen on the coronal or sagittal images and I don't think it significant.    IMPRESSION AND PLAN: As discussed previously I think this was a vasovagal event.  EEG is normal in the MRI shows no significant abnormalities.  Feel she can be released from a neurologic perspective.  Be reasonable to repeat the MRI in 3-6 months to follow-up on this all focus of enhancement.  I think it may well be artifactual and I doubt is of any clinical " significance.  However follow-up exam would be reasonable.    At this point I'll see her again the request        Ibrahima Lujan MD  8/26/2017  4:22 PM

## 2017-08-26 NOTE — PLAN OF CARE
Problem: Inpatient Occupational Therapy  Goal: Patient Education Goal LTG- OT    08/26/17 0925   Patient Education OT LTG   Patient Education OT LTG, Date Established 08/26/17   Patient Education OT LTG, Time to Achieve 1 day   Patient Education OT LTG, Education Type home safety   Patient Education OT LTG, Education Understanding verbalizes understanding   Patient Education OT LTG Outcome goal met

## 2017-08-26 NOTE — PLAN OF CARE
Problem: Patient Care Overview (Adult)  Goal: Plan of Care Review    08/26/17 1324   Coping/Psychosocial Response Interventions   Plan Of Care Reviewed With patient   Outcome Evaluation   Outcome Summary/Follow up Plan Patient received the initial PT eval. She transfered independently, and ambulated without an assistive device 400 feet without difficulty. She does not require additional physical therapy.

## 2017-08-26 NOTE — THERAPY DISCHARGE NOTE
Acute Care - Physical Therapy Initial Eval/Discharge  [facility]     Patient Name: Mag Silva  : 1945  MRN: [id number]  Today's Date: 2017         Referring Physician: Juan Daniel      Admit Date: 2017    Visit Dx:    ICD-10-CM ICD-9-CM   1. Syncope and collapse R55 780.2   2. Head injury, initial encounter S09.90XA 959.01   3. Lumbar strain, initial encounter S39.012A 847.2   4. Difficulty walking R26.2 719.7     Patient Active Problem List   Diagnosis   • Coronary arteriosclerosis in native artery   • Benign essential hypertension   • Chronic coronary artery disease   • Chest pain   • Chronic obstructive pulmonary disease   • Dyslipidemia   • Ventricular premature beats   • Syncope   • Hyperlipidemia     Past Medical History:   Diagnosis Date   • Chest pain    • COPD (chronic obstructive pulmonary disease)    • Coronary artery disease    • Crescendo angina    • Dyslipidemia    • Hypertension    • PVC (premature ventricular contraction)    • Syncope      Past Surgical History:   Procedure Laterality Date   • BACK SURGERY     • CARDIAC CATHETERIZATION     • CAROTID STENT     • HYSTERECTOMY     • NECK SURGERY            PT ASSESSMENT (last 72 hours)      PT Evaluation       17 1300 17 0919    Rehab Evaluation    Document Type evaluation;discharge summary  -MM evaluation;discharge summary  -SG    Subjective Information agree to therapy  -MM agree to therapy  -SG    General Information    Patient Profile Review  yes  -SG    Referring Physician  Juan Daniel  -SONG    General Observations  walking out of bathroom to bed  -SG    Precautions/Limitations fall precautions  -MM fall precautions  -SG    Prior Level of Function independent:  -MM independent:;ADL's  -SG    Equipment Currently Used at Home none  -MM none  -SG    Plans/Goals Discussed With patient  -MM patient;agreed upon  -SG    Living Environment    Lives With alone  -MM alone  -SG    Clinical Impression    Patient/Family Goals Statement  To return home, and to walking 2 miles a day.  -MM     Criteria for Skilled Therapeutic Interventions Met no  -MM     Vision Assessment/Intervention    Visual Impairment  WFL  -SG    Cognitive Assessment/Intervention    Current Cognitive/Communication Assessment functional  -MM functional  -SG    Orientation Status oriented x 4  -MM oriented x 4  -SG    Follows Commands/Answers Questions 100% of the time  -% of the time;able to follow multi-step instructions  -SG    ROM (Range of Motion)    General ROM no range of motion deficits identified  -MM     General ROM Detail  BUE's WFL AROM  -SG    Bed Mobility, Assessment/Treatment    Bed Mob, Supine to Sit, [place] independent  -MM     Bed Mob, Sit to Supine, [place]  supervision required  -SG    Transfer Assessment/Treatment    Transfers, Stand-Sit [place] independent  -MM     Gait Assessment/Treatment    Gait, [place] Level contact guard assist  -MM     Gait, Distance (Feet) 400  -MM     Gait, Gait Deviations other (see comments)   Very slight sway at times.  -MM     Balance Skills Training    Sitting-Level of Assistance Independent  -MM     Standing-Level of Assistance Independent  -MM     Gait Balance-Level of Assistance Close supervision  -MM     Sensory Assessment/Intervention    Light Touch  LUE;RUE  -SG    LUE Light Touch  WNL  -SG    RUE Light Touch  WNL  -SG    Positioning and Restraints    Pre-Treatment Position in bed  -MM standing in room  -SG    Post Treatment Position chair  -MM bed  -SG    In Bed sitting;call light within reach  -MM supine;call light within reach;encouraged to call for assist;exit alarm on;with nsg  -SG      08/25/17 1454 08/25/17 1237    Rehab Evaluation    Evaluation Not Performed patient unavailable for evaluation   [facility] 1454  - patient unavailable for evaluation   off the floor to EEG. Will follow up tomorrow.  -KH      08/25/17 1045 08/25/17 0245    General Information    Equipment Currently Used at  Home none  -[facility]     Living Environment    Lives With alone  -[facility] alone  -MT    Living Arrangements house  -[facility] house  -MT    Home Accessibility no concerns;bed and bath on same level  -[facility] no concerns;bed and bath on same level  -MT    Stair Railings at Home outside, present at both sides  -[facility] outside, present at both sides  -MT    Type of Financial/Environmental Concern none  -[facility] none  -MT    Transportation Available car  -[facility] car  -MT      08/25/17 0242       General Information    Equipment Currently Used at Home none  -MT       User Key  (r) = Recorded By, (t) = Taken By, (c) = Cosigned By    Initials Name Provider Type     Katherine Funez, PT Physical Therapist    SONG Shea, OTR Occupational Therapist    TRAVIS Anne, MISSY Case Manager    MARCO A Carrera, MISSY Registered Nurse    ROLANDO Farmer, PT Physical Therapist              PT Recommendation and Plan  Anticipated Discharge Disposition: home  PT Frequency: evaluation only  Plan of Care Review  Plan Of Care Reviewed With: patient  Outcome Summary/Follow up Plan: Patient received the initial PT eval.  She transfered independently, and ambulated without an assistive device 400 feet without difficulty.  She does not require additional physical therapy.              Outcome Measures       08/26/17 1300 08/26/17 0926       How much help from another person do you currently need...    Turning from your back to your side while in flat bed without using bedrails? 4  -MM      Moving from lying on back to sitting on the side of a flat bed without bedrails? 4  -MM      Moving to and from a bed to a chair (including a wheelchair)? 4  -MM      Standing up from a chair using your arms (e.g., wheelchair, bedside chair)? 4  -MM      Climbing 3-5 steps with a railing? 3  -MM      To walk in hospital room? 3  -MM      AM-PAC 6 Clicks Score 22  -MM      How much help from another is currently needed...    Putting on and taking off regular  lower body clothing?  3  -SG     Bathing (including washing, rinsing, and drying)  3  -SG     Toileting (which includes using toilet bed pan or urinal)  3  -SG     Putting on and taking off regular upper body clothing  3  -SG     Taking care of personal grooming (such as brushing teeth)  3  -SG     Eating meals  4  -SG     Score  19  -SG     Functional Assessment    Outcome Measure Options AM-PAC 6 Clicks Basic Mobility (PT)  -MM AM-PAC 6 Clicks Daily Activity (OT)  -SG       User Key  (r) = Recorded By, (t) = Taken By, (c) = Cosigned By    Initials Name Provider Type    SG Jenna Shea, OTR Occupational Therapist    ROLANDO Farmer, PT Physical Therapist           Time Calculation:         PT Charges       08/26/17 1330          Time Calculation    Start Time 1305  -MM      Stop Time 1320  -MM      Time Calculation (min) 15 min  -MM      PT Received On 08/26/17  -MM        User Key  (r) = Recorded By, (t) = Taken By, (c) = Cosigned By    Initials Name Provider Type    ROLANDO Farmer, PT Physical Therapist          Therapy Charges for Today     Code Description Service Date Service Provider Modifiers Qty    [id number]  PT EVAL LOW COMPLEXITY 2 8/26/2017 Jacki Farmer, PT GP 1          PT G-Codes  Outcome Measure Options: AM-PAC 6 Clicks Basic Mobility (PT)    PT Discharge Summary  Anticipated Discharge Disposition: home  Reason for Discharge: At baseline function, Independent    Jacki Farmer, PT  8/26/2017

## 2017-08-26 NOTE — PROGRESS NOTES
"Daily progress note      Chief complaint  Doing better but still very weak    History of present illness  71 white female with history of COPD coronary artery disease hypertension hyperlipidemia brought to the emergency room when she has had a syncopal episode in the parking lot when she struck her head and back landed on the concrete.  No loss of consciousness but she lost control of her bladder.  No chest pain but she does have diarrhea but denies any nausea vomiting abdominal pain or increased shortness of breath.      REVIEW OF SYSTEMS  Review of Systems   Constitutional: Negative for chills and fever.   HENT: Negative for sore throat and trouble swallowing.    Eyes: Negative for visual disturbance.   Respiratory: Negative for cough and shortness of breath.    Cardiovascular: Negative for chest pain, palpitations and leg swelling.   Gastrointestinal: Positive for nausea. Negative for abdominal pain, diarrhea and vomiting.   Endocrine: Negative.    Genitourinary: Negative for decreased urine volume, dysuria and frequency.   Musculoskeletal: Positive for back pain. Negative for neck pain.   Skin: Negative for rash.   Allergic/Immunologic: Negative.    Neurological: Positive for syncope. Negative for weakness, numbness and headaches.   Hematological: Negative.    Psychiatric/Behavioral: Negative.    All other systems reviewed and are negative.        PHYSICAL EXAM.Blood pressure 151/81, pulse 69, temperature 97.8 °F (36.6 °C), temperature source Oral, resp. rate 16, height 64\" (162.6 cm), weight 207 lb 8 oz (94.1 kg), SpO2 96 %.    Constitutional: She is oriented to person, place, and time. She appears distressed (mild).   Head: Normocephalic and atraumatic.   Eyes: EOM are normal.   Neck: Normal range of motion. Neck supple.   Cardiovascular: Normal rate, regular rhythm, normal heart sounds and intact distal pulses.    No murmur heard.  Pulses:Posterior tibial pulses are 2+ on the right side, and 2+ on the left " side.   Pulmonary/Chest: Effort normal and breath sounds normal. No respiratory distress.   Abdominal: Soft. Bowel sounds are normal. There is no tenderness. There is no rebound, no guarding and no CVA tenderness.   Musculoskeletal: Normal range of motion. She exhibits no edema. Cervical back: She exhibits no tenderness.   No step off   Neurological: She is alert and oriented to person, place, and time.   Skin: Skin is warm and dry.   No obvious bruising   Psych normal mood and behavior       LAB RESULTS  Lab Results (last 24 hours)     Procedure Component Value Units Date/Time    Hemoglobin A1c [[id number]]  (Abnormal) Collected:  08/26/17 0447    Specimen:  Blood Updated:  08/26/17 0519     Hemoglobin A1C 5.76 (H) %     Narrative:       Hemoglobin A1C Ranges:    Increased Risk for Diabetes  5.7% to 6.4%  Diabetes                     >= 6.5%  Diabetic Goal                < 7.0%    CBC Auto Differential [[id number]]  (Abnormal) Collected:  08/26/17 0447    Specimen:  Blood Updated:  08/26/17 0546     WBC 3.74 (L) 10*3/mm3      RBC 4.37 10*6/mm3      Hemoglobin 11.8 (L) g/dL      Hematocrit 39.1 %      MCV 89.5 fL      MCH 27.0 pg      MCHC 30.2 (L) g/dL      RDW 14.3 (H) %      RDW-SD 47.1 fl      MPV 11.7 fL      Platelets 158 10*3/mm3      Neutrophil % 38.5 (L) %      Lymphocyte % 43.0 %      Monocyte % 17.1 (H) %      Eosinophil % 1.1 %      Basophil % 0.3 %      Immature Grans % 0.0 %      Neutrophils, Absolute 1.44 (L) 10*3/mm3      Lymphocytes, Absolute 1.61 10*3/mm3      Monocytes, Absolute 0.64 10*3/mm3      Eosinophils, Absolute 0.04 10*3/mm3      Basophils, Absolute 0.01 10*3/mm3      Immature Grans, Absolute 0.00 10*3/mm3     CBC & Differential [[id number]] Collected:  08/26/17 0447    Specimen:  Blood Updated:  08/26/17 0546    Narrative:       The following orders were created for panel order CBC & Differential.  Procedure                               Abnormality         Status                      ---------                               -----------         ------                     CBC Auto Differential[[id number]]        Abnormal            Final result                 Please view results for these tests on the individual orders.    Lipid Panel [[id number]]  (Abnormal) Collected:  08/26/17 0447    Specimen:  Blood Updated:  08/26/17 0549     Total Cholesterol 77 mg/dL      Triglycerides 108 mg/dL      HDL Cholesterol 29 (L) mg/dL      LDL Cholesterol  26 mg/dL      VLDL Cholesterol 21.6 mg/dL      LDL/HDL Ratio 0.91    Narrative:       Cholesterol Reference Ranges  (U.S. Department of Health and Human Services ATP III Classifications)    Desirable          <200 mg/dL  Borderline High    200-239 mg/dL  High Risk          >240 mg/dL      Triglyceride Reference Ranges  (U.S. Department of Health and Human Services ATP III Classifications)    Normal           <150 mg/dL  Borderline High  150-199 mg/dL  High             200-499 mg/dL  Very High        >500 mg/dL    HDL Reference Ranges  (U.S. Department of Health and Human Services ATP III Classifcations)    Low     <40 mg/dl (major risk factor for CHD)  High    >60 mg/dl ('negative' risk factor for CHD)        LDL Reference Ranges  (U.S. Department of Health and Human Services ATP III Classifcations)    Optimal          <100 mg/dL  Near Optimal     100-129 mg/dL  Borderline High  130-159 mg/dL  High             160-189 mg/dL  Very High        >189 mg/dL    Comprehensive Metabolic Panel [[id number]]  (Abnormal) Collected:  08/26/17 0447    Specimen:  Blood Updated:  08/26/17 0553     Glucose 104 (H) mg/dL      BUN 16 mg/dL      Creatinine 0.60 mg/dL      Sodium 143 mmol/L      Potassium 4.4 mmol/L      Chloride 111 (H) mmol/L      CO2 18.2 (L) mmol/L      Calcium 8.1 (L) mg/dL      Total Protein 5.6 (L) g/dL      Albumin 2.90 (L) g/dL      ALT (SGPT) 9 U/L      AST (SGOT) 12 U/L      Alkaline Phosphatase 67 U/L      Total Bilirubin 0.2 mg/dL      eGFR Non   Amer 99 mL/min/1.73      Globulin 2.7 gm/dL      A/G Ratio 1.1 g/dL      BUN/Creatinine Ratio 26.7 (H)     Anion Gap 13.8 mmol/L     TSH [[id number]]  (Normal) Collected:  08/26/17 0447    Specimen:  Blood Updated:  08/26/17 0555     TSH 0.592 mIU/mL     BNP [[id number]]  (Normal) Collected:  08/26/17 0447    Specimen:  Blood Updated:  08/26/17 0555     proBNP 197.3 pg/mL     Narrative:       Among patients with dyspnea, NT-proBNP is highly sensitive for the detection of acute congestive heart failure. In addition NT-proBNP of <300 pg/ml effectively rules out acute congestive heart failure with 99% negative predictive value.    Clostridium Difficile Toxin [[id number]] Collected:  08/26/17 0331    Specimen:  Stool from Per Rectum Updated:  08/26/17 0846    Narrative:       The following orders were created for panel order Clostridium Difficile Toxin.  Procedure                               Abnormality         Status                     ---------                               -----------         ------                     Clostridium Difficile To...[[id number]]  Normal              Final result                 Please view results for these tests on the individual orders.    Clostridium Difficile Toxin, PCR [[id number]]  (Normal) Collected:  08/26/17 0331    Specimen:  Stool from Per Rectum Updated:  08/26/17 0846     C. Difficile Toxins by PCR Negative        Imaging Results (last 72 hours)     Procedure Component Value Units Date/Time    CT Head Without Contrast [[id number]] Collected:  08/24/17 2247     Updated:  08/24/17 2247    Narrative:       CT SCAN OF THE BRAIN WITHOUT CONTRAST.     TECHNIQUE: Multiple axial images of the brain were obtained from the  vertex to the base of the brain.     HISTORY:  Trauma.     COMPARISON:  No prior studies for comparison.     FINDINGS:   Midline structures are within normal limits, there is no hydrocephalus.  Gray-white matter differentiation is maintained. Small vessel  ischemic  disease and cortical atrophy related changes. Punctate calcification in  the left basal ganglia.     Orbits are within normal limits. There is small amount of fluid in both  sphenoid sinuses. Mastoid air cells are well aerated.             Impression:       1.  No definite acute intracranial pathology.   2.  Fluid in both sphenoid sinuses may represent sinusitis.             XR Spine Lumbar 4+ View [[id number]] Collected:  08/24/17 2321     Updated:  08/24/17 2321    Narrative:       X-RAY CHEST 1 VIEW.     HISTORY: Evaluate for trauma.     COMPARISON: No prior studies for comparison.     FINDINGS:  Cardiomediastinal silhouette is within normal limits.         There is no consolidation or effusion. Chronic lung changes are present.          Impression:       No acute findings.         ----------------------------------------------------------------     X-RAY LUMBAR SPINE 4 VIEWS.     HISTORY: Trauma, back pain.     COMPARISON: No prior studies for comparison.     FINDINGS:  Vertebral body height is normal, no acute fracture.  Straightening of  the lordotic curve may be due to muscle spasm. Multilevel mild spurring  and loss of intervertebral disc height, mild facet joint disease.     Severe atherosclerotic disease of the aorta.      IMPRESSION:  No fracture or subluxation of the lumbar spine.              XR Chest 1 View [[id number]] Collected:  08/24/17 2321     Updated:  08/24/17 2321    Narrative:       X-RAY CHEST 1 VIEW.     HISTORY: Evaluate for trauma.     COMPARISON: No prior studies for comparison.     FINDINGS:  Cardiomediastinal silhouette is within normal limits.         There is no consolidation or effusion. Chronic lung changes are present.          Impression:       No acute findings.         ----------------------------------------------------------------     X-RAY LUMBAR SPINE 4 VIEWS.     HISTORY: Trauma, back pain.     COMPARISON: No prior studies for comparison.     FINDINGS:  Vertebral  body height is normal, no acute fracture.  Straightening of  the lordotic curve may be due to muscle spasm. Multilevel mild spurring  and loss of intervertebral disc height, mild facet joint disease.     Severe atherosclerotic disease of the aorta.      IMPRESSION:  No fracture or subluxation of the lumbar spine.               EKG          EKG time: 2150   Rhythm/Rate: sinus rhythm, rate: 70s  Normal P waves and normal PA interval   Normal QRS, Normal axis  Normal ST and T waves      Current Facility-Administered Medications:   •  acetaminophen (TYLENOL) tablet 650 mg, 650 mg, Oral, Q4H PRN, Perry Frances MD, 650 mg at 08/25/17 0344  •  amLODIPine (NORVASC) tablet 10 mg, 10 mg, Oral, Nightly, Perry Frances MD  •  aspirin chewable tablet 81 mg, 81 mg, Oral, Nightly, Perry Frances MD  •  atorvastatin (LIPITOR) tablet 10 mg, 10 mg, Oral, Nightly, Perry Frances MD  •  cholecalciferol (VITAMIN D3) tablet 2,000 Units, 2,000 Units, Oral, Nightly, Perry Frances MD  •  clopidogrel (PLAVIX) tablet 75 mg, 75 mg, Oral, Daily, Perry Frances MD, 75 mg at 08/26/17 0917  •  diphenoxylate-atropine (LOMOTIL) 2.5-0.025 MG per tablet 1 tablet, 1 tablet, Oral, Q2H PRN, Perry Frances MD, 1 tablet at 08/26/17 0914  •  ondansetron (ZOFRAN) injection 4 mg, 4 mg, Intravenous, Q4H PRN, Perry Frances MD, 4 mg at 08/25/17 1953  •  pantoprazole (PROTONIX) EC tablet 40 mg, 40 mg, Oral, Q AM, Perry Frances MD, 40 mg at 08/26/17 0914  •  sodium chloride 0.9 % flush 10 mL, 10 mL, Intravenous, PRN, Debbie Rodriguez MD  •  Insert peripheral IV, , , Once **AND** sodium chloride 0.9 % flush 10 mL, 10 mL, Intravenous, PRN, Debbie Rodriguez MD  •  sodium chloride 0.9 % with KCl 20 mEq/L infusion, 125 mL/hr, Intravenous, Continuous, Perry Frances MD, Last Rate: 125 mL/hr at 08/26/17 1300, 125 mL/hr at 08/26/17 1300  •  valsartan (DIOVAN) tablet 40 mg, 40 mg, Oral, Daily, Perry Frances MD, 40 mg at 08/26/17 0917  •  zolpidem (AMBIEN) tablet 10 mg, 10 mg, Oral, Nightly PRN,  Nadir Frances MD     ASSESSMENT  Syncopal episode probably vasovagal   Hypertension  Hyperlipidemia  Gastroesophageal reflux disease  Dehydration  Diarrhea  chronic with C. difficile negative  Low back pain with lumbar strain  Status post fall    PLAN  CPM  Supportive care  IV fluid  Aspirin Plavix and Lipitor  Neuro consult NOTED  Stress ulcer DVT prophylaxis  PTOT  Follow closely further recommendation according to hospital course    NADIR FRANCES MD

## 2017-08-26 NOTE — NURSING NOTE
Call to Dr Frances for possible flagyl order and lomotil. Pt has given cdif sample.  Immediate call back. MD declined to order flagyl. Lomotil order received

## 2017-08-26 NOTE — PLAN OF CARE
Problem: Patient Care Overview (Adult)  Goal: Plan of Care Review    08/26/17 0041   Coping/Psychosocial Response Interventions   Plan Of Care Reviewed With patient;daughter   Patient Care Overview   Progress improving         Problem: Pain, Acute (Adult)  Goal: Acceptable Pain Control/Comfort Level  Outcome: Ongoing (interventions implemented as appropriate)    08/26/17 0041   Pain, Acute (Adult)   Acceptable Pain Control/Comfort Level making progress toward outcome

## 2017-08-26 NOTE — PLAN OF CARE
Problem: Patient Care Overview (Adult)  Goal: Plan of Care Review  Outcome: Ongoing (interventions implemented as appropriate)    08/26/17 1722   Coping/Psychosocial Response Interventions   Plan Of Care Reviewed With patient   Patient Care Overview   Progress improving   Outcome Evaluation   Outcome Summary/Follow up Plan Up in chair much of day. VSS and NSR on monitor with no ectopy or other CV events noted. Asks for assistance to get up for safety but is steady and full weight bearing. Denies pain except some scalp tenderness where she hit head upon falling. Taking PO food and fluids well. Neg assessment per neuro--may need a follow up scan. Likely home tomorrow. Will continue to monitor.       Goal: Adult Individualization and Mutuality  Outcome: Ongoing (interventions implemented as appropriate)  Goal: Discharge Needs Assessment  Outcome: Ongoing (interventions implemented as appropriate)    Problem: Pain, Acute (Adult)  Goal: Identify Related Risk Factors and Signs and Symptoms  Outcome: Ongoing (interventions implemented as appropriate)  Goal: Acceptable Pain Control/Comfort Level  Outcome: Ongoing (interventions implemented as appropriate)

## 2017-08-27 VITALS
SYSTOLIC BLOOD PRESSURE: 145 MMHG | HEIGHT: 64 IN | WEIGHT: 207.5 LBS | OXYGEN SATURATION: 95 % | TEMPERATURE: 98.5 F | RESPIRATION RATE: 16 BRPM | DIASTOLIC BLOOD PRESSURE: 82 MMHG | HEART RATE: 68 BPM | BODY MASS INDEX: 35.42 KG/M2

## 2017-08-27 LAB
ANION GAP SERPL CALCULATED.3IONS-SCNC: 10.2 MMOL/L
BASOPHILS # BLD AUTO: 0.01 10*3/MM3 (ref 0–0.2)
BASOPHILS NFR BLD AUTO: 0.2 % (ref 0–1.5)
BUN BLD-MCNC: 9 MG/DL (ref 8–23)
BUN/CREAT SERPL: 15.8 (ref 7–25)
CALCIUM SPEC-SCNC: 9 MG/DL (ref 8.6–10.5)
CHLORIDE SERPL-SCNC: 106 MMOL/L (ref 98–107)
CO2 SERPL-SCNC: 24.8 MMOL/L (ref 22–29)
CREAT BLD-MCNC: 0.57 MG/DL (ref 0.57–1)
DEPRECATED RDW RBC AUTO: 45.3 FL (ref 37–54)
EOSINOPHIL # BLD AUTO: 0.15 10*3/MM3 (ref 0–0.7)
EOSINOPHIL NFR BLD AUTO: 2.7 % (ref 0.3–6.2)
ERYTHROCYTE [DISTWIDTH] IN BLOOD BY AUTOMATED COUNT: 13.9 % (ref 11.7–13)
GFR SERPL CREATININE-BSD FRML MDRD: 105 ML/MIN/1.73
GLUCOSE BLD-MCNC: 104 MG/DL (ref 65–99)
HCT VFR BLD AUTO: 39 % (ref 35.6–45.5)
HGB BLD-MCNC: 12.2 G/DL (ref 11.9–15.5)
IMM GRANULOCYTES # BLD: 0 10*3/MM3 (ref 0–0.03)
IMM GRANULOCYTES NFR BLD: 0 % (ref 0–0.5)
LYMPHOCYTES # BLD AUTO: 1.46 10*3/MM3 (ref 0.9–4.8)
LYMPHOCYTES NFR BLD AUTO: 26.5 % (ref 19.6–45.3)
MCH RBC QN AUTO: 27.8 PG (ref 26.9–32)
MCHC RBC AUTO-ENTMCNC: 31.3 G/DL (ref 32.4–36.3)
MCV RBC AUTO: 88.8 FL (ref 80.5–98.2)
MONOCYTES # BLD AUTO: 0.74 10*3/MM3 (ref 0.2–1.2)
MONOCYTES NFR BLD AUTO: 13.4 % (ref 5–12)
NEUTROPHILS # BLD AUTO: 3.15 10*3/MM3 (ref 1.9–8.1)
NEUTROPHILS NFR BLD AUTO: 57.2 % (ref 42.7–76)
PLATELET # BLD AUTO: 160 10*3/MM3 (ref 140–500)
PMV BLD AUTO: 11.6 FL (ref 6–12)
POTASSIUM BLD-SCNC: 4.3 MMOL/L (ref 3.5–5.2)
RBC # BLD AUTO: 4.39 10*6/MM3 (ref 3.9–5.2)
SODIUM BLD-SCNC: 141 MMOL/L (ref 136–145)
WBC NRBC COR # BLD: 5.51 10*3/MM3 (ref 4.5–10.7)

## 2017-08-27 PROCEDURE — G0378 HOSPITAL OBSERVATION PER HR: HCPCS

## 2017-08-27 PROCEDURE — 96361 HYDRATE IV INFUSION ADD-ON: CPT

## 2017-08-27 PROCEDURE — 80048 BASIC METABOLIC PNL TOTAL CA: CPT | Performed by: HOSPITALIST

## 2017-08-27 PROCEDURE — 85025 COMPLETE CBC W/AUTO DIFF WBC: CPT | Performed by: HOSPITALIST

## 2017-08-27 PROCEDURE — 25810000003 SODIUM CHLORIDE 0.9 % WITH KCL 20 MEQ 20-0.9 MEQ/L-% SOLUTION: Performed by: HOSPITALIST

## 2017-08-27 RX ORDER — VALSARTAN 40 MG/1
40 TABLET ORAL DAILY
Qty: 30 TABLET | Refills: 0 | Status: SHIPPED | OUTPATIENT
Start: 2017-08-27 | End: 2017-09-26

## 2017-08-27 RX ORDER — AMLODIPINE BESYLATE 10 MG/1
10 TABLET ORAL DAILY
Qty: 30 TABLET | Refills: 0 | Status: SHIPPED | OUTPATIENT
Start: 2017-08-27 | End: 2017-09-26

## 2017-08-27 RX ORDER — ATORVASTATIN CALCIUM 10 MG/1
10 TABLET, FILM COATED ORAL NIGHTLY
Qty: 30 TABLET | Refills: 0 | Status: SHIPPED | OUTPATIENT
Start: 2017-08-27 | End: 2017-09-26

## 2017-08-27 RX ADMIN — AMLODIPINE BESYLATE 10 MG: 10 TABLET ORAL at 09:45

## 2017-08-27 RX ADMIN — VALSARTAN 40 MG: 40 TABLET, FILM COATED ORAL at 07:43

## 2017-08-27 RX ADMIN — POTASSIUM CHLORIDE AND SODIUM CHLORIDE 75 ML/HR: 900; 150 INJECTION, SOLUTION INTRAVENOUS at 06:28

## 2017-08-27 RX ADMIN — CLOPIDOGREL BISULFATE 75 MG: 75 TABLET ORAL at 07:44

## 2017-08-27 RX ADMIN — ASPIRIN 81 MG: 81 TABLET, CHEWABLE ORAL at 07:45

## 2017-08-27 RX ADMIN — PANTOPRAZOLE SODIUM 40 MG: 40 TABLET, DELAYED RELEASE ORAL at 06:27

## 2017-08-27 NOTE — PLAN OF CARE
Problem: Patient Care Overview (Adult)  Goal: Plan of Care Review  Outcome: Ongoing (interventions implemented as appropriate)    08/27/17 0239   Coping/Psychosocial Response Interventions   Plan Of Care Reviewed With patient   Patient Care Overview   Progress improving   Outcome Evaluation   Outcome Summary/Follow up Plan Monitor pain,vitals,and labs. IV fluids. Plan is to DC home soon.       Goal: Adult Individualization and Mutuality  Outcome: Ongoing (interventions implemented as appropriate)  Goal: Discharge Needs Assessment  Outcome: Ongoing (interventions implemented as appropriate)    Problem: Pain, Acute (Adult)  Goal: Identify Related Risk Factors and Signs and Symptoms  Outcome: Ongoing (interventions implemented as appropriate)    08/27/17 0239   Pain, Acute   Related Risk Factors (Acute Pain) persistent pain;trauma   Signs and Symptoms (Acute Pain) verbalization of pain descriptors       Goal: Acceptable Pain Control/Comfort Level  Outcome: Ongoing (interventions implemented as appropriate)    08/27/17 0239   Pain, Acute (Adult)   Acceptable Pain Control/Comfort Level making progress toward outcome

## 2017-08-27 NOTE — PROGRESS NOTES
"Daily progress note      Chief complaint  Doing better   No new complaints    History of present illness  71 white female with history of COPD coronary artery disease hypertension hyperlipidemia brought to the emergency room when she has had a syncopal episode in the parking lot when she struck her head and back landed on the concrete.  No loss of consciousness but she lost control of her bladder.  No chest pain but she does have diarrhea but denies any nausea vomiting abdominal pain or increased shortness of breath.      REVIEW OF SYSTEMS  Review of Systems   Constitutional: Negative for chills and fever.   HENT: Negative for sore throat and trouble swallowing.    Eyes: Negative for visual disturbance.   Respiratory: Negative for cough and shortness of breath.    Cardiovascular: Negative for chest pain, palpitations and leg swelling.   Gastrointestinal: Positive for nausea. Negative for abdominal pain, diarrhea and vomiting.   Endocrine: Negative.    Genitourinary: Negative for decreased urine volume, dysuria and frequency.   Musculoskeletal: Positive for back pain. Negative for neck pain.   Skin: Negative for rash.   Allergic/Immunologic: Negative.    Neurological: Positive for syncope. Negative for weakness, numbness and headaches.   Hematological: Negative.    Psychiatric/Behavioral: Negative.    All other systems reviewed and are negative.        PHYSICAL EXAM.Blood pressure 145/82, pulse 68, temperature 98.5 °F (36.9 °C), temperature source Oral, resp. rate 16, height 64\" (162.6 cm), weight 207 lb 8 oz (94.1 kg), SpO2 95 %.    Constitutional: She is oriented to person, place, and time. She appears distressed (mild).   Head: Normocephalic and atraumatic.   Eyes: EOM are normal.   Neck: Normal range of motion. Neck supple.   Cardiovascular: Normal rate, regular rhythm, normal heart sounds and intact distal pulses.    No murmur heard.  Pulses:Posterior tibial pulses are 2+ on the right side, and 2+ on the left " side.   Pulmonary/Chest: Effort normal and breath sounds normal. No respiratory distress.   Abdominal: Soft. Bowel sounds are normal. There is no tenderness. There is no rebound, no guarding and no CVA tenderness.   Musculoskeletal: Normal range of motion. She exhibits no edema. Cervical back: She exhibits no tenderness.   No step off   Neurological: She is alert and oriented to person, place, and time.   Skin: Skin is warm and dry.   No obvious bruising   Psych normal mood and behavior       LAB RESULTS  Lab Results (last 24 hours)     Procedure Component Value Units Date/Time    CBC & Differential [[id number]] Collected:  08/27/17 0531    Specimen:  Blood Updated:  08/27/17 0613    Narrative:       The following orders were created for panel order CBC & Differential.  Procedure                               Abnormality         Status                     ---------                               -----------         ------                     CBC Auto Differential[[id number]]        Abnormal            Final result                 Please view results for these tests on the individual orders.    CBC Auto Differential [[id number]]  (Abnormal) Collected:  08/27/17 0531    Specimen:  Blood Updated:  08/27/17 0613     WBC 5.51 10*3/mm3      RBC 4.39 10*6/mm3      Hemoglobin 12.2 g/dL      Hematocrit 39.0 %      MCV 88.8 fL      MCH 27.8 pg      MCHC 31.3 (L) g/dL      RDW 13.9 (H) %      RDW-SD 45.3 fl      MPV 11.6 fL      Platelets 160 10*3/mm3      Neutrophil % 57.2 %      Lymphocyte % 26.5 %      Monocyte % 13.4 (H) %      Eosinophil % 2.7 %      Basophil % 0.2 %      Immature Grans % 0.0 %      Neutrophils, Absolute 3.15 10*3/mm3      Lymphocytes, Absolute 1.46 10*3/mm3      Monocytes, Absolute 0.74 10*3/mm3      Eosinophils, Absolute 0.15 10*3/mm3      Basophils, Absolute 0.01 10*3/mm3      Immature Grans, Absolute 0.00 10*3/mm3     Basic Metabolic Panel [[id number]]  (Abnormal) Collected:  08/27/17 0531    Specimen:   Blood Updated:  08/27/17 0653     Glucose 104 (H) mg/dL      BUN 9 mg/dL      Creatinine 0.57 mg/dL      Sodium 141 mmol/L      Potassium 4.3 mmol/L      Chloride 106 mmol/L      CO2 24.8 mmol/L      Calcium 9.0 mg/dL      eGFR Non African Amer 105 mL/min/1.73      BUN/Creatinine Ratio 15.8     Anion Gap 10.2 mmol/L     Narrative:       GFR Normal >60  Chronic Kidney Disease <60  Kidney Failure <15        Imaging Results (last 72 hours)     Procedure Component Value Units Date/Time    CT Head Without Contrast [[id number]] Collected:  08/24/17 2247     Updated:  08/24/17 2247    Narrative:       CT SCAN OF THE BRAIN WITHOUT CONTRAST.     TECHNIQUE: Multiple axial images of the brain were obtained from the  vertex to the base of the brain.     HISTORY:  Trauma.     COMPARISON:  No prior studies for comparison.     FINDINGS:   Midline structures are within normal limits, there is no hydrocephalus.  Gray-white matter differentiation is maintained. Small vessel ischemic  disease and cortical atrophy related changes. Punctate calcification in  the left basal ganglia.     Orbits are within normal limits. There is small amount of fluid in both  sphenoid sinuses. Mastoid air cells are well aerated.             Impression:       1.  No definite acute intracranial pathology.   2.  Fluid in both sphenoid sinuses may represent sinusitis.             XR Spine Lumbar 4+ View [[id number]] Collected:  08/24/17 2321     Updated:  08/24/17 2321    Narrative:       X-RAY CHEST 1 VIEW.     HISTORY: Evaluate for trauma.     COMPARISON: No prior studies for comparison.     FINDINGS:  Cardiomediastinal silhouette is within normal limits.         There is no consolidation or effusion. Chronic lung changes are present.          Impression:       No acute findings.         ----------------------------------------------------------------     X-RAY LUMBAR SPINE 4 VIEWS.     HISTORY: Trauma, back pain.     COMPARISON: No prior studies for  comparison.     FINDINGS:  Vertebral body height is normal, no acute fracture.  Straightening of  the lordotic curve may be due to muscle spasm. Multilevel mild spurring  and loss of intervertebral disc height, mild facet joint disease.     Severe atherosclerotic disease of the aorta.      IMPRESSION:  No fracture or subluxation of the lumbar spine.              XR Chest 1 View [[id number]] Collected:  08/24/17 2321     Updated:  08/24/17 2321    Narrative:       X-RAY CHEST 1 VIEW.     HISTORY: Evaluate for trauma.     COMPARISON: No prior studies for comparison.     FINDINGS:  Cardiomediastinal silhouette is within normal limits.         There is no consolidation or effusion. Chronic lung changes are present.          Impression:       No acute findings.         ----------------------------------------------------------------     X-RAY LUMBAR SPINE 4 VIEWS.     HISTORY: Trauma, back pain.     COMPARISON: No prior studies for comparison.     FINDINGS:  Vertebral body height is normal, no acute fracture.  Straightening of  the lordotic curve may be due to muscle spasm. Multilevel mild spurring  and loss of intervertebral disc height, mild facet joint disease.     Severe atherosclerotic disease of the aorta.      IMPRESSION:  No fracture or subluxation of the lumbar spine.               EKG          EKG time: 2150   Rhythm/Rate: sinus rhythm, rate: 70s  Normal P waves and normal MO interval   Normal QRS, Normal axis  Normal ST and T waves      Current Facility-Administered Medications:   •  acetaminophen (TYLENOL) tablet 650 mg, 650 mg, Oral, Q4H PRN, Perry Frances MD, 650 mg at 08/26/17 2042  •  amLODIPine (NORVASC) tablet 10 mg, 10 mg, Oral, Daily, Perry Frances MD, 10 mg at 08/27/17 0945  •  aspirin chewable tablet 81 mg, 81 mg, Oral, Daily, Perry Frances MD, 81 mg at 08/27/17 0745  •  atorvastatin (LIPITOR) tablet 10 mg, 10 mg, Oral, Nightly, Perry Frances MD, 10 mg at 08/26/17 2032  •  cholecalciferol (VITAMIN D3)  tablet 2,000 Units, 2,000 Units, Oral, Nightly, Nadir Frances MD, 2,000 Units at 08/26/17 2033  •  clopidogrel (PLAVIX) tablet 75 mg, 75 mg, Oral, Daily, Nadir Frances MD, 75 mg at 08/27/17 0744  •  diphenoxylate-atropine (LOMOTIL) 2.5-0.025 MG per tablet 1 tablet, 1 tablet, Oral, Q2H PRN, Nadir Frances MD, 1 tablet at 08/26/17 0914  •  ondansetron (ZOFRAN) injection 4 mg, 4 mg, Intravenous, Q4H PRN, Nadir Frances MD, 4 mg at 08/25/17 1953  •  pantoprazole (PROTONIX) EC tablet 40 mg, 40 mg, Oral, Q AM, Nadir Frances MD, 40 mg at 08/27/17 0627  •  sodium chloride 0.9 % flush 10 mL, 10 mL, Intravenous, PRN, Debbie Rodriguez MD  •  Insert peripheral IV, , , Once **AND** sodium chloride 0.9 % flush 10 mL, 10 mL, Intravenous, PRN, Debbie Rodriguez MD  •  sodium chloride 0.9 % with KCl 20 mEq/L infusion, 75 mL/hr, Intravenous, Continuous, Nadir Frances MD, Last Rate: 75 mL/hr at 08/27/17 1200, 75 mL/hr at 08/27/17 1200  •  valsartan (DIOVAN) tablet 40 mg, 40 mg, Oral, Daily, Nadir Frances MD, 40 mg at 08/27/17 0743  •  zolpidem (AMBIEN) tablet 10 mg, 10 mg, Oral, Nightly PRN, Nadir Frances MD, 10 mg at 08/26/17 4337     ASSESSMENT  Syncopal episode probably vasovagal   Hypertension  Hyperlipidemia  Gastroesophageal reflux disease  Dehydration  Diarrhea  chronic with C. difficile negative  Low back pain with lumbar strain  Status post fall    PLAN  DISCHARGE  SUMMARY DICTATED      NADIR FRANCES MD

## 2017-08-27 NOTE — DISCHARGE SUMMARY
Discharge summary    Date of admission 8/24/2017  Date of discharge 8/27/2017    Final diagnosis  Syncopal episode probably vasovagal   Hypertension  Hyperlipidemia  Gastroesophageal reflux disease  Dehydration  Diarrhea  chronic with C. difficile negative  Low back pain with lumbar strain  Status post fall    Discharge medications    Current Facility-Administered Medications:   •  amLODIPine (NORVASC) tablet 10 mg, 10 mg, Oral, Daily, Nadir Frances MD, 10 mg at 08/27/17 0945  •  aspirin chewable tablet 81 mg, 81 mg, Oral, Daily, Nadir Frances MD, 81 mg at 08/27/17 0745  •  atorvastatin (LIPITOR) tablet 10 mg, 10 mg, Oral, Nightly, Nadir Frances MD, 10 mg at 08/26/17 2032  •  cholecalciferol (VITAMIN D3) tablet 2,000 Units, 2,000 Units, Oral, Nightly, Nadir Frances MD, 2,000 Units at 08/26/17 2033  •  clopidogrel (PLAVIX) tablet 75 mg, 75 mg, Oral, Daily, Nadir Frances MD, 75 mg at 08/27/17 0744  •  pantoprazole (PROTONIX) EC tablet 40 mg, 40 mg, Oral, Q AM, Nadir Frances MD, 40 mg at 08/27/17 0627  •  Insert peripheral IV, , , Once **AND** sodium chloride 0.9 % flush 10 mL, 10 mL, Intravenous, PRN, Debbie Rodriguez MD  •  valsartan (DIOVAN) tablet 40 mg, 40 mg, Oral, Daily, Nadir Frances MD, 40 mg at 08/27/17 0743     Consult obtained  Neurology    Procedures  None    Hospital course  71 white female with history of COPD coronary artery disease hypertension hyperlipidemia admitted through emergency room after syncopal episode.  Patient admitted her workup with MRI EEG 2-D echo carotid Doppler everything normal blood pressure medication adjusted she remain on aspirin Plavix Lipitor.  Patient is fully alert oriented no symptoms whatsoever.  Patient clear from neuro to be discharged and they can follow as an outpatient.    Discharge diet regular    Activity as tolerated but no driving until cleared by primary care doctor    Medication as above    Follow-up with primary doctor in 1 week and take medications directed    NADIR  GRACY HAMPTON

## 2017-08-28 NOTE — PROGRESS NOTES
Continued Stay Note  Psychiatric     Patient Name: Mag Silva  MRN: [id number]  Today's Date: 8/28/2017    Admit Date: 8/24/2017          Discharge Plan       08/28/17 0922    Final Note    Final Note discharged home              Discharge Codes       08/28/17 0922    Discharge Codes    Discharge Codes 01  Discharge to home        Expected Discharge Date and Time     Expected Discharge Date Expected Discharge Time    Aug 27, 2017             Elizabeth Flores

## 2017-11-15 ENCOUNTER — TRANSCRIBE ORDERS (OUTPATIENT)
Dept: ADMINISTRATIVE | Facility: HOSPITAL | Age: 72
End: 2017-11-15

## 2017-11-15 DIAGNOSIS — R93.89 ABNORMAL CT SCAN: Primary | ICD-10-CM

## 2017-11-24 ENCOUNTER — APPOINTMENT (OUTPATIENT)
Dept: MRI IMAGING | Facility: HOSPITAL | Age: 72
End: 2017-11-24

## 2017-12-04 ENCOUNTER — HOSPITAL ENCOUNTER (OUTPATIENT)
Dept: MRI IMAGING | Facility: HOSPITAL | Age: 72
Discharge: HOME | End: 2017-12-04

## 2017-12-14 ENCOUNTER — HOSPITAL ENCOUNTER (OUTPATIENT)
Dept: MRI IMAGING | Facility: HOSPITAL | Age: 72
Discharge: HOME | End: 2017-12-14
Admitting: INTERNAL MEDICINE

## 2017-12-14 DIAGNOSIS — R93.89 ABNORMAL CT SCAN: ICD-10-CM

## 2017-12-14 LAB — CREAT BLDA-MCNC: 0.6 MG/DL (ref 0.6–1.3)

## 2017-12-14 PROCEDURE — A9577 INJ MULTIHANCE: HCPCS | Performed by: INTERNAL MEDICINE

## 2017-12-14 PROCEDURE — 70553 MRI BRAIN STEM W/O & W/DYE: CPT

## 2017-12-14 PROCEDURE — 0 GADOBENATE DIMEGLUMINE 529 MG/ML SOLUTION: Performed by: INTERNAL MEDICINE

## 2017-12-14 PROCEDURE — 82565 ASSAY OF CREATININE: CPT

## 2017-12-14 RX ADMIN — GADOBENATE DIMEGLUMINE 20 ML: 529 INJECTION, SOLUTION INTRAVENOUS at 15:30

## 2018-01-02 ENCOUNTER — OFFICE VISIT (OUTPATIENT)
Dept: CARDIOLOGY | Facility: CLINIC | Age: 73
End: 2018-01-02

## 2018-01-02 VITALS
HEART RATE: 76 BPM | BODY MASS INDEX: 35.7 KG/M2 | DIASTOLIC BLOOD PRESSURE: 81 MMHG | SYSTOLIC BLOOD PRESSURE: 164 MMHG | WEIGHT: 208 LBS

## 2018-01-02 DIAGNOSIS — E78.5 HYPERLIPIDEMIA, UNSPECIFIED HYPERLIPIDEMIA TYPE: ICD-10-CM

## 2018-01-02 DIAGNOSIS — I25.10 CORONARY ARTERIOSCLEROSIS IN NATIVE ARTERY: Primary | ICD-10-CM

## 2018-01-02 DIAGNOSIS — I10 BENIGN ESSENTIAL HYPERTENSION: ICD-10-CM

## 2018-01-02 PROCEDURE — 99213 OFFICE O/P EST LOW 20 MIN: CPT | Performed by: INTERNAL MEDICINE

## 2018-01-02 RX ORDER — VALSARTAN 40 MG/1
40 TABLET ORAL DAILY
COMMUNITY
End: 2019-08-06

## 2018-01-02 RX ORDER — CHLORAL HYDRATE 500 MG
CAPSULE ORAL
COMMUNITY

## 2018-01-02 RX ORDER — ATORVASTATIN CALCIUM 10 MG/1
10 TABLET, FILM COATED ORAL DAILY
COMMUNITY

## 2018-01-02 RX ORDER — AMLODIPINE BESYLATE 10 MG/1
10 TABLET ORAL DAILY
COMMUNITY
End: 2020-10-06

## 2018-01-02 NOTE — PROGRESS NOTES
Subjective:       Mag Silva is a 72 y.o. female who here for follow up    CC  POST HOSP FOLLOW UP  HPI    72-year-old female sent to discharge from the hospital with the following diagnosis      Final diagnosis  Syncopal episode probably vasovagal   Hypertension  Hyperlipidemia  Gastroesophageal reflux disease  Dehydration  Diarrhea  chronic with C. difficile negative  Low back pain with lumbar strain  Status post fall        Problem List Items Addressed This Visit        Cardiovascular and Mediastinum    Coronary arteriosclerosis in native artery - Primary    Relevant Medications    amLODIPine (NORVASC) 10 MG tablet    Benign essential hypertension    Relevant Medications    amLODIPine (NORVASC) 10 MG tablet    valsartan (DIOVAN) 40 MG tablet    Hyperlipidemia    Relevant Medications    atorvastatin (LIPITOR) 10 MG tablet        .    The following portions of the patient's history were reviewed and updated as appropriate: allergies, current medications, past family history, past medical history, past social history, past surgical history and problem list.    Past Medical History:   Diagnosis Date   • Chest pain    • COPD (chronic obstructive pulmonary disease)    • Coronary artery disease    • Crescendo angina    • Dyslipidemia    • Hypertension    • PVC (premature ventricular contraction)    • Syncope     reports that she quit smoking about 13 years ago. Her smoking use included Cigarettes. She has a 30.00 pack-year smoking history. She does not have any smokeless tobacco history on file. She reports that she does not drink alcohol or use illicit drugs.  Family History   Problem Relation Age of Onset   • Stroke Mother    • Asthma Father    • Cancer Father        Review of Systems  Constitutional: No wt loss, fever, fatigue  Gastrointestinal: No nausea, abdominal pain  Behavioral/Psych: No insomnia or anxiety   Cardiovascular No chest pains or tightness in chest  Objective:       Physical Exam            Physical Exam  /81  Pulse 76  Wt 94.3 kg (208 lb)  BMI 35.7 kg/m2    General appearance: NAD, conversant   Eyes: anicteric sclerae, moist conjunctivae; no lid-lag; PERRLA   HENT: Atraumatic; oropharynx clear with moist mucous membranes and no mucosal ulcerations;  normal hard and soft palate   Neck: Trachea midline; FROM, supple, no thyromegaly or lymphadenopathy   Lungs: CTA, with normal respiratory effort and no intercostal retractions   CV: S1-S2 regular, no murmurs, no rub, no gallop   Abdomen: Soft, non-tender; no masses or HSM   Extremities: No peripheral edema or extremity lymphadenopathy  Skin: Normal temperature, turgor and texture; no rash, ulcers or subcutaneous nodules   Psych: Appropriate affect, alert and oriented to person, place and time           Cardiographics  @Procedures    Echocardiogram:    Interpretation Summary   · Left atrial cavity size is borderline dilated.  · Left Ventricle: Calculated EF = 84.8%.  · Trace mitral valve regurgitation is present  · There is no evidence of pericardial effusion   Interpretation Summary   · Proximal right internal carotid artery moderate stenosis.  · Proximal left internal carotid artery moderate stenosis.           Current Outpatient Prescriptions:   •  Acetaminophen (TYLENOL 8 HOUR ARTHRITIS PAIN PO), Take  by mouth., Disp: , Rfl:   •  amLODIPine (NORVASC) 10 MG tablet, Take 10 mg by mouth Daily., Disp: , Rfl:   •  aspirin 81 MG chewable tablet, Chew 81 mg Every Night., Disp: , Rfl:   •  atorvastatin (LIPITOR) 10 MG tablet, Take 10 mg by mouth Daily., Disp: , Rfl:   •  Cholecalciferol (VITAMIN D3) 2000 UNITS capsule, Take 2,000 Units by mouth Every Night., Disp: , Rfl:   •  clopidogrel (PLAVIX) 75 MG tablet, TAKE 1 TABLET EVERY DAY, Disp: 90 tablet, Rfl: 3  •  famotidine (PEPCID) 20 MG tablet, Take 20 mg by mouth 2 (Two) Times a Day As Needed., Disp: , Rfl:   •  Multiple Vitamins-Minerals (MULTI COMPLETE PO), Take  by mouth., Disp: , Rfl:   •   Omega-3 Fatty Acids (FISH OIL) 1000 MG capsule capsule, Take  by mouth Daily With Breakfast., Disp: , Rfl:   •  tiotropium bromide-olodaterol (STIOLTO RESPIMAT) 2.5-2.5 MCG/ACT aerosol solution inhaler, Inhale Daily., Disp: , Rfl:   •  valsartan (DIOVAN) 40 MG tablet, Take 40 mg by mouth Daily., Disp: , Rfl:   •  zolpidem (AMBIEN) 10 MG tablet, Take 10 mg by mouth At Night As Needed for sleep., Disp: , Rfl:    Assessment:        Patient Active Problem List   Diagnosis   • Coronary arteriosclerosis in native artery   • Benign essential hypertension   • Chronic coronary artery disease   • Chest pain   • Chronic obstructive pulmonary disease   • Dyslipidemia   • Ventricular premature beats   • Syncope   • Hyperlipidemia               Plan:            ICD-10-CM ICD-9-CM   1. Coronary arteriosclerosis in native artery I25.10 414.01   2. Benign essential hypertension I10 401.1   3. Hyperlipidemia, unspecified hyperlipidemia type E78.5 272.4     1. Coronary arteriosclerosis in native artery  Mag Silva with coronary artery disease has no angina pectoris    Risk reduction for the coronary artery disease, controlling the blood pressure, blood sugar management, cholesterol management, exercise, stress management, and proper compliance with medications and follow-up has been discussed      2. Benign essential hypertension  Importance of controlling hypertension and blood pressure  checkup on the regular basis has been explained  Hypertension as a silent killer has been discussed  Risk reduction of the weight and regular exercises to control the hypertension has been explained      3. Hyperlipidemia, unspecified hyperlipidemia type  Risk of the hyperlipidemia, importance of the treatment has been explained    Pros and cons of the statins has been explained    Regular blood workup as well as side effects including the liver failure, myelopathy death has been explained           SEE US 1 YR  COUNSELING:    Mag LATHAM  Sis was given to patient for the following topics: diagnostic results, risk factor reductions, impressions, risks and benefits of treatment options and importance of treatment compliance .       SMOKING COUNSELING:    Counseling given: Not Answered      EMR Dragon/Transcription disclaimer:   Much of this encounter note is an electronic transcription/translation of spoken language to printed text. The electronic translation of spoken language may permit erroneous, or at times, nonsensical words or phrases to be inadvertently transcribed; Although I have reviewed the note for such errors, some may still exist.

## 2018-05-03 RX ORDER — CLOPIDOGREL BISULFATE 75 MG/1
TABLET ORAL
Qty: 90 TABLET | Refills: 3 | Status: SHIPPED | OUTPATIENT
Start: 2018-05-03 | End: 2021-09-28

## 2019-08-06 ENCOUNTER — OFFICE VISIT (OUTPATIENT)
Dept: CARDIOLOGY | Facility: CLINIC | Age: 74
End: 2019-08-06

## 2019-08-06 VITALS
WEIGHT: 182 LBS | HEIGHT: 64 IN | DIASTOLIC BLOOD PRESSURE: 83 MMHG | BODY MASS INDEX: 31.07 KG/M2 | HEART RATE: 75 BPM | SYSTOLIC BLOOD PRESSURE: 129 MMHG

## 2019-08-06 DIAGNOSIS — I25.10 CORONARY ARTERIOSCLEROSIS IN NATIVE ARTERY: ICD-10-CM

## 2019-08-06 DIAGNOSIS — R55 VASOVAGAL SYNCOPE: Primary | ICD-10-CM

## 2019-08-06 DIAGNOSIS — I10 BENIGN ESSENTIAL HYPERTENSION: ICD-10-CM

## 2019-08-06 DIAGNOSIS — E78.5 HYPERLIPIDEMIA, UNSPECIFIED HYPERLIPIDEMIA TYPE: ICD-10-CM

## 2019-08-06 PROCEDURE — 93000 ELECTROCARDIOGRAM COMPLETE: CPT | Performed by: INTERNAL MEDICINE

## 2019-08-06 PROCEDURE — 99213 OFFICE O/P EST LOW 20 MIN: CPT | Performed by: INTERNAL MEDICINE

## 2019-08-06 RX ORDER — PHENTERMINE HYDROCHLORIDE 37.5 MG/1
37.5 TABLET ORAL EVERY MORNING
Refills: 0 | COMMUNITY
Start: 2019-07-25 | End: 2020-10-06

## 2019-08-06 RX ORDER — NITROGLYCERIN 0.3 MG/1
TABLET SUBLINGUAL
Qty: 100 TABLET | Refills: 11 | Status: SHIPPED | OUTPATIENT
Start: 2019-08-06 | End: 2019-08-09 | Stop reason: DRUGHIGH

## 2019-08-06 RX ORDER — LOSARTAN POTASSIUM 25 MG/1
TABLET ORAL
COMMUNITY
Start: 2019-06-08 | End: 2020-10-06

## 2019-08-06 NOTE — PROGRESS NOTES
" Subjective:        Mag Silva is a 73 y.o. female who here for follow up    CC  To follow-up for the coronary artery disease hypertension syncope  HPI  73-year-old female with known history of coronary artery disease, benign essential arterial hypertension syncope and hyperlipidemia here for the follow-up    Patient has had previous myocardial infarction as well as angioplasty and the stent denies any symptoms at this point     Problem List Items Addressed This Visit        Cardiovascular and Mediastinum    Coronary arteriosclerosis in native artery    Relevant Medications    nitroglycerin (NITROSTAT) 0.3 MG SL tablet    Benign essential hypertension    Relevant Medications    losartan (COZAAR) 25 MG tablet    Syncope - Primary    Hyperlipidemia        .    The following portions of the patient's history were reviewed and updated as appropriate: allergies, current medications, past family history, past medical history, past social history, past surgical history and problem list.    Past Medical History:   Diagnosis Date   • Chest pain    • COPD (chronic obstructive pulmonary disease) (CMS/HCC)    • Coronary artery disease    • Crescendo angina (CMS/[facility])    • Dyslipidemia    • Hypertension    • PVC (premature ventricular contraction)    • Syncope      reports that she quit smoking about 15 years ago. Her smoking use included cigarettes. She has a 30.00 pack-year smoking history. She does not have any smokeless tobacco history on file. She reports that she does not drink alcohol or use drugs.   Family History   Problem Relation Age of Onset   • Stroke Mother    • Asthma Father    • Cancer Father        Review of Systems  Constitutional: No wt loss, fever, fatigue  Gastrointestinal: No nausea, abdominal pain  Behavioral/Psych: No insomnia or anxiety   Cardiovascular no chest pains or tightness no chest  Objective:       Physical Exam  /83   Pulse 75   Ht 162.6 cm (64\")   Wt 82.6 kg (182 lb)   BMI 31.24 " kg/m²   General appearance: No acute changes   Neck: Trachea midline; NECK, supple, no thyromegaly or lymphadenopathy   Lungs: Normal size and shape, normal breath sounds, equal distribution of air, no rales and rhonchi   CV: S1-S2 regular, no murmurs, no rub, no gallop   Abdomen: Soft, non-tender; no masses , no abnormal abdominal sounds   Extremities: No deformity , normal color , no peripheral edema   Skin: Normal temperature, turgor and texture; no rash, ulcers            ECG 12 Lead  Date/Time: 8/6/2019 3:53 PM  Performed by: Tony Ayala MD  Authorized by: Tony Ayala MD   Comparison: compared with previous ECG   Similar to previous ECG  Rhythm: sinus rhythm    Clinical impression: non-specific ECG              Echocardiogram:        Current Outpatient Medications:   •  Acetaminophen (TYLENOL 8 HOUR ARTHRITIS PAIN PO), Take  by mouth., Disp: , Rfl:   •  amLODIPine (NORVASC) 10 MG tablet, Take 10 mg by mouth Daily., Disp: , Rfl:   •  aspirin 81 MG chewable tablet, Chew 81 mg Every Night., Disp: , Rfl:   •  atorvastatin (LIPITOR) 10 MG tablet, Take 10 mg by mouth Daily., Disp: , Rfl:   •  Cholecalciferol (VITAMIN D3) 2000 UNITS capsule, Take 2,000 Units by mouth Every Night., Disp: , Rfl:   •  clopidogrel (PLAVIX) 75 MG tablet, TAKE 1 TABLET EVERY DAY, Disp: 90 tablet, Rfl: 3  •  famotidine (PEPCID) 20 MG tablet, Take 20 mg by mouth 2 (Two) Times a Day As Needed., Disp: , Rfl:   •  losartan (COZAAR) 25 MG tablet, , Disp: , Rfl:   •  Multiple Vitamins-Minerals (MULTI COMPLETE PO), Take  by mouth., Disp: , Rfl:   •  Omega-3 Fatty Acids (FISH OIL) 1000 MG capsule capsule, Take  by mouth Daily With Breakfast., Disp: , Rfl:   •  phentermine (ADIPEX-P) 37.5 MG tablet, Take 37.5 mg by mouth Every Morning., Disp: , Rfl: 0  •  tiotropium bromide-olodaterol (STIOLTO RESPIMAT) 2.5-2.5 MCG/ACT aerosol solution inhaler, Inhale Daily., Disp: , Rfl:   •  zolpidem (AMBIEN) 10 MG tablet, Take 10 mg by mouth  At Night As Needed for sleep., Disp: , Rfl:    Assessment:        Patient Active Problem List   Diagnosis   • Coronary arteriosclerosis in native artery   • Benign essential hypertension   • Chronic coronary artery disease   • Chest pain   • Chronic obstructive pulmonary disease (CMS/HCC)   • Dyslipidemia   • Ventricular premature beats   • Syncope   • Hyperlipidemia               Plan:            ICD-10-CM ICD-9-CM   1. Vasovagal syncope R55 780.2   2. Hyperlipidemia, unspecified hyperlipidemia type E78.5 272.4   3. Coronary arteriosclerosis in native artery I25.10 414.01   4. Benign essential hypertension I10 401.1     1. Vasovagal syncope  The work-up is negative, normal symptom    2. Hyperlipidemia, unspecified hyperlipidemia type  Continue current medication    3. Coronary arteriosclerosis in native artery  No angina pectoris    4. Benign essential hypertension  Blood pressure controlled       STRESS TEST PT REFUSES    SEE IN 1YR  COUNSELING:    Mag Alegre was given to patient for the following topics: diagnostic results, risk factor reductions, impressions, risks and benefits of treatment options and importance of treatment compliance .       SMOKING COUNSELING:    Counseling given: Yes      Dictated using Dragon dictation

## 2019-08-09 RX ORDER — NITROGLYCERIN 0.4 MG/1
TABLET SUBLINGUAL
Qty: 25 TABLET | Refills: 3 | Status: SHIPPED | OUTPATIENT
Start: 2019-08-09

## 2020-10-06 ENCOUNTER — OFFICE VISIT (OUTPATIENT)
Dept: CARDIOLOGY | Facility: CLINIC | Age: 75
End: 2020-10-06

## 2020-10-06 VITALS
WEIGHT: 209 LBS | BODY MASS INDEX: 35.87 KG/M2 | SYSTOLIC BLOOD PRESSURE: 168 MMHG | DIASTOLIC BLOOD PRESSURE: 92 MMHG | HEART RATE: 78 BPM | TEMPERATURE: 98.1 F

## 2020-10-06 DIAGNOSIS — I25.10 CORONARY ARTERIOSCLEROSIS IN NATIVE ARTERY: Primary | ICD-10-CM

## 2020-10-06 DIAGNOSIS — I10 BENIGN ESSENTIAL HYPERTENSION: ICD-10-CM

## 2020-10-06 DIAGNOSIS — E78.5 HYPERLIPIDEMIA, UNSPECIFIED HYPERLIPIDEMIA TYPE: ICD-10-CM

## 2020-10-06 PROCEDURE — 99213 OFFICE O/P EST LOW 20 MIN: CPT | Performed by: INTERNAL MEDICINE

## 2020-10-06 PROCEDURE — 93000 ELECTROCARDIOGRAM COMPLETE: CPT | Performed by: INTERNAL MEDICINE

## 2020-10-06 RX ORDER — OLMESARTAN MEDOXOMIL AND HYDROCHLOROTHIAZIDE 40/25 40; 25 MG/1; MG/1
1 TABLET ORAL DAILY
Qty: 90 TABLET | Refills: 3 | Status: SHIPPED | OUTPATIENT
Start: 2020-10-06

## 2020-10-06 RX ORDER — LOSARTAN POTASSIUM AND HYDROCHLOROTHIAZIDE 12.5; 1 MG/1; MG/1
TABLET ORAL
COMMUNITY
Start: 2020-09-21 | End: 2020-10-06 | Stop reason: ALTCHOICE

## 2020-10-06 NOTE — PROGRESS NOTES
1 year follow up  BP problems   Subjective:        Mag Silva is a 75 y.o. female who here for follow up    CC  BP RUNNING HIGH  HPI  75-year-old female with known history of the coronary artery disease benign essential arterial hypertension hyperlipidemia here for the follow-up as the blood pressure has been running high, patient recently Norvasc was discontinued because of the leg edema     Problem List Items Addressed This Visit        Cardiovascular and Mediastinum    Coronary arteriosclerosis in native artery - Primary    Benign essential hypertension    Relevant Medications    olmesartan-hydrochlorothiazide (Benicar HCT) 40-25 MG per tablet    Hyperlipidemia        .    The following portions of the patient's history were reviewed and updated as appropriate: allergies, current medications, past family history, past medical history, past social history, past surgical history and problem list.    Past Medical History:   Diagnosis Date   • Chest pain    • COPD (chronic obstructive pulmonary disease) (CMS/[facility])    • Coronary artery disease    • Crescendo angina (CMS/[facility])    • Dyslipidemia    • Hypertension    • PVC (premature ventricular contraction)    • Syncope      reports that she quit smoking about 16 years ago. Her smoking use included cigarettes. She has a 30.00 pack-year smoking history. She does not have any smokeless tobacco history on file. She reports that she does not drink alcohol or use drugs.   Family History   Problem Relation Age of Onset   • Stroke Mother    • Asthma Father    • Cancer Father        Review of Systems  Constitutional: No wt loss, fever, fatigue  Gastrointestinal: No nausea, abdominal pain  Behavioral/Psych: No insomnia or anxiety   Cardiovascular shortness of breath and chest pain  Objective:       Physical Exam  /92 (BP Location: Left arm, Patient Position: Sitting)   Pulse 78   Temp 98.1 °F (36.7 °C)   Wt 94.8 kg (209 lb)   BMI 35.87 kg/m²   General appearance:  No acute changes   Neck: Trachea midline; NECK, supple, no thyromegaly or lymphadenopathy   Lungs: Normal size and shape, normal breath sounds, equal distribution of air, no rales and rhonchi   CV: S1-S2 regular, no murmurs, no rub, no gallop   Abdomen: Soft, non-tender; no masses , no abnormal abdominal sounds   Extremities: No deformity , normal color , no peripheral edema   Skin: Normal temperature, turgor and texture; no rash, ulcers            ECG 12 Lead    Date/Time: 10/6/2020 3:19 PM  Performed by: Tony Ayala MD  Authorized by: Tony Ayala MD   Comparison: compared with previous ECG   Similar to previous ECG  Rhythm: sinus rhythm  ST Flattening: all    Clinical impression: non-specific ECG              Echocardiogram:        Current Outpatient Medications:   •  Acetaminophen (TYLENOL 8 HOUR ARTHRITIS PAIN PO), Take  by mouth., Disp: , Rfl:   •  aspirin 81 MG chewable tablet, Chew 81 mg Every Night., Disp: , Rfl:   •  atorvastatin (LIPITOR) 10 MG tablet, Take 10 mg by mouth Daily., Disp: , Rfl:   •  Cholecalciferol (VITAMIN D3) 2000 UNITS capsule, Take 2,000 Units by mouth Every Night., Disp: , Rfl:   •  clopidogrel (PLAVIX) 75 MG tablet, TAKE 1 TABLET EVERY DAY, Disp: 90 tablet, Rfl: 3  •  famotidine (PEPCID) 20 MG tablet, Take 20 mg by mouth 2 (Two) Times a Day As Needed., Disp: , Rfl:   •  losartan-hydrochlorothiazide (HYZAAR) 100-12.5 MG per tablet, , Disp: , Rfl:   •  Multiple Vitamins-Minerals (MULTI COMPLETE PO), Take  by mouth., Disp: , Rfl:   •  nitroglycerin (NITROSTAT) 0.4 MG SL tablet, 1 under the tongue as needed for angina, may repeat q5mins for up three doses, Disp: 25 tablet, Rfl: 3  •  Omega-3 Fatty Acids (FISH OIL) 1000 MG capsule capsule, Take  by mouth Daily With Breakfast., Disp: , Rfl:   •  tiotropium bromide-olodaterol (STIOLTO RESPIMAT) 2.5-2.5 MCG/ACT aerosol solution inhaler, Inhale Daily., Disp: , Rfl:   •  zolpidem (AMBIEN) 10 MG tablet, Take 10 mg by mouth  At Night As Needed for sleep., Disp: , Rfl:    Assessment:        Patient Active Problem List   Diagnosis   • Coronary arteriosclerosis in native artery   • Benign essential hypertension   • Chronic coronary artery disease   • Chest pain   • Chronic obstructive pulmonary disease (CMS/HCC)   • Dyslipidemia   • Ventricular premature beats   • Syncope   • Hyperlipidemia               Plan:            ICD-10-CM ICD-9-CM   1. Coronary arteriosclerosis in native artery  I25.10 414.01   2. Hyperlipidemia, unspecified hyperlipidemia type  E78.5 272.4   3. Benign essential hypertension  I10 401.1     1. Coronary arteriosclerosis in native artery  No angina pectoris    2. Hyperlipidemia, unspecified hyperlipidemia type  Continue current treatment    3. Benign essential hypertension  Blood pressure is high we will change the medication as listed below       DC HYZAAR  BENICAR/HCT 40/25 MG  Pros and cons of this new medication / change medication has been explained to  the patient    Possible side effects has been explained    Associated need of the blood  Work has been explained    Need for the compliance of the medication has been explained    SEE IN 3 WK  COUNSELING:    Mag Alegre was given to patient for the following topics: diagnostic results, risk factor reductions, impressions, risks and benefits of treatment options and importance of treatment compliance .       SMOKING COUNSELING:    [unfilled]    Dictated using Dragon dictation

## 2020-11-03 ENCOUNTER — OFFICE VISIT (OUTPATIENT)
Dept: CARDIOLOGY | Facility: CLINIC | Age: 75
End: 2020-11-03

## 2020-11-03 VITALS
DIASTOLIC BLOOD PRESSURE: 85 MMHG | WEIGHT: 213 LBS | HEIGHT: 64 IN | HEART RATE: 76 BPM | BODY MASS INDEX: 36.37 KG/M2 | SYSTOLIC BLOOD PRESSURE: 159 MMHG

## 2020-11-03 DIAGNOSIS — I25.10 CORONARY ARTERIOSCLEROSIS IN NATIVE ARTERY: Primary | ICD-10-CM

## 2020-11-03 DIAGNOSIS — E78.5 HYPERLIPIDEMIA, UNSPECIFIED HYPERLIPIDEMIA TYPE: ICD-10-CM

## 2020-11-03 DIAGNOSIS — R07.2 PRECORDIAL PAIN: ICD-10-CM

## 2020-11-03 DIAGNOSIS — I10 BENIGN ESSENTIAL HYPERTENSION: ICD-10-CM

## 2020-11-03 PROCEDURE — 99213 OFFICE O/P EST LOW 20 MIN: CPT | Performed by: INTERNAL MEDICINE

## 2020-11-03 RX ORDER — AMLODIPINE BESYLATE 5 MG/1
5 TABLET ORAL DAILY
Qty: 30 TABLET | Refills: 11 | Status: SHIPPED | OUTPATIENT
Start: 2020-11-03 | End: 2021-08-05 | Stop reason: HOSPADM

## 2020-11-03 NOTE — PROGRESS NOTES
3 week follow up     Subjective:        Mag Silva is a 75 y.o. female who here for follow up    CC  Follow-up coronary artery disease hyperlipidemia chest pain  HPI  75-year-old with a hypertension here for the follow-up after the change in medication blood pressure is better but continues to be high     Problems Addressed this Visit        Cardiovascular and Mediastinum    Coronary arteriosclerosis in native artery - Primary    Relevant Medications    amLODIPine (NORVASC) 5 MG tablet    Benign essential hypertension    Relevant Medications    amLODIPine (NORVASC) 5 MG tablet    Hyperlipidemia       Nervous and Auditory    Chest pain      Diagnoses       Codes Comments    Coronary arteriosclerosis in native artery    -  Primary ICD-10-CM: I25.10  ICD-9-CM: 414.01     Hyperlipidemia, unspecified hyperlipidemia type     ICD-10-CM: E78.5  ICD-9-CM: 272.4     Precordial pain     ICD-10-CM: R07.2  ICD-9-CM: 786.51     Benign essential hypertension     ICD-10-CM: I10  ICD-9-CM: 401.1         .    The following portions of the patient's history were reviewed and updated as appropriate: allergies, current medications, past family history, past medical history, past social history, past surgical history and problem list.    Past Medical History:   Diagnosis Date   • Chest pain    • COPD (chronic obstructive pulmonary disease) (CMS/[facility])    • Coronary artery disease    • Crescendo angina (CMS/[facility])    • Dyslipidemia    • Hypertension    • PVC (premature ventricular contraction)    • Syncope      reports that she quit smoking about 16 years ago. Her smoking use included cigarettes. She has a 30.00 pack-year smoking history. She does not have any smokeless tobacco history on file. She reports that she does not drink alcohol or use drugs.   Family History   Problem Relation Age of Onset   • Stroke Mother    • Asthma Father    • Cancer Father        Review of Systems  Constitutional: No wt loss, fever,  "fatigue  Gastrointestinal: No nausea, abdominal pain  Behavioral/Psych: No insomnia or anxiety   Cardiovascular no chest pains or tightness in the chest  Objective:       Physical Exam  /85   Pulse 76   Ht 162.6 cm (64\")   Wt 96.6 kg (213 lb)   BMI 36.56 kg/m²   General appearance: No acute changes   Neck: Trachea midline; NECK, supple, no thyromegaly or lymphadenopathy   Lungs: Normal size and shape, normal breath sounds, equal distribution of air, no rales and rhonchi   CV: S1-S2 regular, no murmurs, no rub, no gallop   Abdomen: Soft, non-tender; no masses , no abnormal abdominal sounds   Extremities: No deformity , normal color , no peripheral edema   Skin: Normal temperature, turgor and texture; no rash, ulcers          Procedures      Echocardiogram:        Current Outpatient Medications:   •  Acetaminophen (TYLENOL 8 HOUR ARTHRITIS PAIN PO), Take  by mouth., Disp: , Rfl:   •  aspirin 81 MG chewable tablet, Chew 81 mg Every Night., Disp: , Rfl:   •  atorvastatin (LIPITOR) 10 MG tablet, Take 10 mg by mouth Daily., Disp: , Rfl:   •  Cholecalciferol (VITAMIN D3) 2000 UNITS capsule, Take 2,000 Units by mouth Every Night., Disp: , Rfl:   •  clopidogrel (PLAVIX) 75 MG tablet, TAKE 1 TABLET EVERY DAY, Disp: 90 tablet, Rfl: 3  •  famotidine (PEPCID) 20 MG tablet, Take 20 mg by mouth 2 (Two) Times a Day As Needed., Disp: , Rfl:   •  Multiple Vitamins-Minerals (MULTI COMPLETE PO), Take  by mouth., Disp: , Rfl:   •  nitroglycerin (NITROSTAT) 0.4 MG SL tablet, 1 under the tongue as needed for angina, may repeat q5mins for up three doses, Disp: 25 tablet, Rfl: 3  •  olmesartan-hydrochlorothiazide (Benicar HCT) 40-25 MG per tablet, Take 1 tablet by mouth Daily., Disp: 90 tablet, Rfl: 3  •  Omega-3 Fatty Acids (FISH OIL) 1000 MG capsule capsule, Take  by mouth Daily With Breakfast., Disp: , Rfl:   •  tiotropium bromide-olodaterol (STIOLTO RESPIMAT) 2.5-2.5 MCG/ACT aerosol solution inhaler, Inhale Daily., Disp: , " Rfl:   •  zolpidem (AMBIEN) 10 MG tablet, Take 10 mg by mouth At Night As Needed for sleep., Disp: , Rfl:   •  amLODIPine (NORVASC) 5 MG tablet, Take 1 tablet by mouth Daily., Disp: 30 tablet, Rfl: 11   Assessment:        Patient Active Problem List   Diagnosis   • Coronary arteriosclerosis in native artery   • Benign essential hypertension   • Chronic coronary artery disease   • Chest pain   • Chronic obstructive pulmonary disease (CMS/HCC)   • Dyslipidemia   • Ventricular premature beats   • Syncope   • Hyperlipidemia               Plan:            ICD-10-CM ICD-9-CM   1. Coronary arteriosclerosis in native artery  I25.10 414.01   2. Hyperlipidemia, unspecified hyperlipidemia type  E78.5 272.4   3. Precordial pain  R07.2 786.51   4. Benign essential hypertension  I10 401.1     1. Coronary arteriosclerosis in native artery  No angina pectoris  2. Hyperlipidemia, unspecified hyperlipidemia type  Continue current treatment    3. Precordial pain  Atypical    4. Benign essential hypertension  Blood pressure is still high we will add new medication       Add norvasc 5mg  Pros and cons of this new medication / change medication has been explained to  the patient    Possible side effects has been explained    Associated need of the blood  Work has been explained    Need for the compliance of the medication has been explained      See in 1 month, will need stress cardiolyte    COUNSELING:    Mag Alegre was given to patient for the following topics: diagnostic results, risk factor reductions, impressions, risks and benefits of treatment options and importance of treatment compliance .       SMOKING COUNSELING:    [unfilled]    Dictated using Dragon dictation

## 2020-12-01 ENCOUNTER — OFFICE VISIT (OUTPATIENT)
Dept: CARDIOLOGY | Facility: CLINIC | Age: 75
End: 2020-12-01

## 2020-12-01 VITALS
DIASTOLIC BLOOD PRESSURE: 85 MMHG | SYSTOLIC BLOOD PRESSURE: 148 MMHG | WEIGHT: 217 LBS | HEIGHT: 64 IN | TEMPERATURE: 97.1 F | BODY MASS INDEX: 37.05 KG/M2 | HEART RATE: 74 BPM

## 2020-12-01 DIAGNOSIS — I10 BENIGN ESSENTIAL HYPERTENSION: Primary | ICD-10-CM

## 2020-12-01 DIAGNOSIS — E78.5 HYPERLIPIDEMIA, UNSPECIFIED HYPERLIPIDEMIA TYPE: ICD-10-CM

## 2020-12-01 DIAGNOSIS — I25.10 CHRONIC CORONARY ARTERY DISEASE: ICD-10-CM

## 2020-12-01 DIAGNOSIS — E66.3 OVERWEIGHT: ICD-10-CM

## 2020-12-01 PROCEDURE — 99213 OFFICE O/P EST LOW 20 MIN: CPT | Performed by: INTERNAL MEDICINE

## 2020-12-01 NOTE — PROGRESS NOTES
1 MO FOLLOW UP     Subjective:        Mag Silva is a 75 y.o. female who here for follow up    CC  FOLLOW UP NORVASC    GAINED WT  HPI  75-year-old female with known history of benign essential arterial hypertension, coronary artery disease and hyperlipidemia here for the follow-up after starting on Norvasc has gained significant weight blood pressures are much better     Problems Addressed this Visit        Cardiovascular and Mediastinum    Benign essential hypertension - Primary    Chronic coronary artery disease    Hyperlipidemia       Other    Overweight      Diagnoses       Codes Comments    Benign essential hypertension    -  Primary ICD-10-CM: I10  ICD-9-CM: 401.1     Chronic coronary artery disease     ICD-10-CM: I25.10  ICD-9-CM: 414.00     Hyperlipidemia, unspecified hyperlipidemia type     ICD-10-CM: E78.5  ICD-9-CM: 272.4     Overweight     ICD-10-CM: E66.3  ICD-9-CM: 278.02         .    The following portions of the patient's history were reviewed and updated as appropriate: allergies, current medications, past family history, past medical history, past social history, past surgical history and problem list.    Past Medical History:   Diagnosis Date   • Chest pain    • COPD (chronic obstructive pulmonary disease) (CMS/[facility])    • Coronary artery disease    • Crescendo angina (CMS/[facility])    • Dyslipidemia    • Hypertension    • PVC (premature ventricular contraction)    • Syncope      reports that she quit smoking about 16 years ago. Her smoking use included cigarettes. She has a 30.00 pack-year smoking history. She does not have any smokeless tobacco history on file. She reports that she does not drink alcohol or use drugs.   Family History   Problem Relation Age of Onset   • Stroke Mother    • Asthma Father    • Cancer Father        Review of Systems  Constitutional: No wt loss, fever, fatigue  Gastrointestinal: No nausea, abdominal pain  Behavioral/Psych: No insomnia or anxiety   Cardiovascular no  "chest pains or tightness in the chest  Objective:       Physical Exam    /85   Pulse 74   Temp 97.1 °F (36.2 °C)   Ht 162.6 cm (64\")   Wt 98.4 kg (217 lb)   BMI 37.25 kg/m²   General appearance: No acute changes   Neck: Trachea midline; NECK, supple, no thyromegaly or lymphadenopathy   Lungs: Normal size and shape, normal breath sounds, equal distribution of air, no rales and rhonchi   CV: S1-S2 regular, no murmurs, no rub, no gallop   Abdomen: Soft, non-tender; no masses , no abnormal abdominal sounds   Extremities: No deformity , normal color , no peripheral edema   Skin: Normal temperature, turgor and texture; no rash, ulcers          Procedures      Echocardiogram:        Current Outpatient Medications:   •  Acetaminophen (TYLENOL 8 HOUR ARTHRITIS PAIN PO), Take  by mouth., Disp: , Rfl:   •  amLODIPine (NORVASC) 5 MG tablet, Take 1 tablet by mouth Daily., Disp: 30 tablet, Rfl: 11  •  aspirin 81 MG chewable tablet, Chew 81 mg Every Night., Disp: , Rfl:   •  atorvastatin (LIPITOR) 10 MG tablet, Take 10 mg by mouth Daily., Disp: , Rfl:   •  Cholecalciferol (VITAMIN D3) 2000 UNITS capsule, Take 2,000 Units by mouth Every Night., Disp: , Rfl:   •  clopidogrel (PLAVIX) 75 MG tablet, TAKE 1 TABLET EVERY DAY, Disp: 90 tablet, Rfl: 3  •  famotidine (PEPCID) 20 MG tablet, Take 20 mg by mouth 2 (Two) Times a Day As Needed., Disp: , Rfl:   •  Multiple Vitamins-Minerals (MULTI COMPLETE PO), Take  by mouth., Disp: , Rfl:   •  olmesartan-hydrochlorothiazide (Benicar HCT) 40-25 MG per tablet, Take 1 tablet by mouth Daily., Disp: 90 tablet, Rfl: 3  •  Omega-3 Fatty Acids (FISH OIL) 1000 MG capsule capsule, Take  by mouth Daily With Breakfast., Disp: , Rfl:   •  tiotropium bromide-olodaterol (STIOLTO RESPIMAT) 2.5-2.5 MCG/ACT aerosol solution inhaler, Inhale Daily., Disp: , Rfl:   •  zolpidem (AMBIEN) 10 MG tablet, Take 10 mg by mouth At Night As Needed for sleep., Disp: , Rfl:   •  nitroglycerin (NITROSTAT) 0.4 MG SL " tablet, 1 under the tongue as needed for angina, may repeat q5mins for up three doses, Disp: 25 tablet, Rfl: 3   Assessment:        Patient Active Problem List   Diagnosis   • Coronary arteriosclerosis in native artery   • Benign essential hypertension   • Chronic coronary artery disease   • Chest pain   • Chronic obstructive pulmonary disease (CMS/HCC)   • Dyslipidemia   • Ventricular premature beats   • Syncope   • Hyperlipidemia               Plan:            ICD-10-CM ICD-9-CM   1. Benign essential hypertension  I10 401.1   2. Chronic coronary artery disease  I25.10 414.00   3. Hyperlipidemia, unspecified hyperlipidemia type  E78.5 272.4   4. Overweight  E66.3 278.02     1. Benign essential hypertension  Blood pressure is much better with Norvasc    2. Chronic coronary artery disease  No angina pectoris    3. Hyperlipidemia, unspecified hyperlipidemia type  Renew current treatment    4. Overweight  Significant risk of obesity to CAD, HTN has been explained  Advantages of wt reduction has been explained         BP MUCH BETTER    LOOSE WT    SEE IN 6 MONTHSCOUNSELING:    Mag Alegre was given to patient for the following topics: diagnostic results, risk factor reductions, impressions, risks and benefits of treatment options and importance of treatment compliance .       SMOKING COUNSELING:    [unfilled]    Dictated using Dragon dictation

## 2020-12-02 PROBLEM — E66.3 OVERWEIGHT: Status: ACTIVE | Noted: 2020-12-02

## 2021-03-02 DIAGNOSIS — Z23 IMMUNIZATION DUE: ICD-10-CM

## 2021-08-02 ENCOUNTER — APPOINTMENT (OUTPATIENT)
Dept: GENERAL RADIOLOGY | Facility: HOSPITAL | Age: 76
End: 2021-08-02

## 2021-08-02 ENCOUNTER — HOSPITAL ENCOUNTER (OUTPATIENT)
Facility: HOSPITAL | Age: 76
Setting detail: OBSERVATION
LOS: 2 days | Discharge: HOME | End: 2021-08-05
Attending: EMERGENCY MEDICINE | Admitting: INTERNAL MEDICINE

## 2021-08-02 DIAGNOSIS — I48.91 ATRIAL FIBRILLATION WITH RVR (HCC): ICD-10-CM

## 2021-08-02 DIAGNOSIS — I48.91 NEW ONSET ATRIAL FIBRILLATION (HCC): Primary | ICD-10-CM

## 2021-08-02 LAB
ALBUMIN SERPL-MCNC: 4.5 G/DL (ref 3.5–5.2)
ALBUMIN/GLOB SERPL: 1.5 G/DL
ALP SERPL-CCNC: 71 U/L (ref 39–117)
ALT SERPL W P-5'-P-CCNC: 21 U/L (ref 1–33)
ANION GAP SERPL CALCULATED.3IONS-SCNC: 12 MMOL/L (ref 5–15)
AST SERPL-CCNC: 21 U/L (ref 1–32)
BASOPHILS # BLD AUTO: 0.05 10*3/MM3 (ref 0–0.2)
BASOPHILS NFR BLD AUTO: 0.4 % (ref 0–1.5)
BILIRUB SERPL-MCNC: 0.4 MG/DL (ref 0–1.2)
BUN SERPL-MCNC: 19 MG/DL (ref 8–23)
BUN/CREAT SERPL: 21.1 (ref 7–25)
CALCIUM SPEC-SCNC: 10.6 MG/DL (ref 8.6–10.5)
CHLORIDE SERPL-SCNC: 101 MMOL/L (ref 98–107)
CO2 SERPL-SCNC: 25 MMOL/L (ref 22–29)
CREAT SERPL-MCNC: 0.9 MG/DL (ref 0.57–1)
DEPRECATED RDW RBC AUTO: 46 FL (ref 37–54)
EOSINOPHIL # BLD AUTO: 0.05 10*3/MM3 (ref 0–0.4)
EOSINOPHIL NFR BLD AUTO: 0.4 % (ref 0.3–6.2)
ERYTHROCYTE [DISTWIDTH] IN BLOOD BY AUTOMATED COUNT: 14.2 % (ref 12.3–15.4)
GFR SERPL CREATININE-BSD FRML MDRD: 61 ML/MIN/1.73
GLOBULIN UR ELPH-MCNC: 3.1 GM/DL
GLUCOSE SERPL-MCNC: 221 MG/DL (ref 65–99)
HCT VFR BLD AUTO: 48.8 % (ref 34–46.6)
HGB BLD-MCNC: 15.4 G/DL (ref 12–15.9)
HOLD SPECIMEN: NORMAL
HOLD SPECIMEN: NORMAL
IMM GRANULOCYTES # BLD AUTO: 0.03 10*3/MM3 (ref 0–0.05)
IMM GRANULOCYTES NFR BLD AUTO: 0.2 % (ref 0–0.5)
INR PPP: 0.95 (ref 0.9–1.1)
LYMPHOCYTES # BLD AUTO: 2.78 10*3/MM3 (ref 0.7–3.1)
LYMPHOCYTES NFR BLD AUTO: 22.1 % (ref 19.6–45.3)
MCH RBC QN AUTO: 27.7 PG (ref 26.6–33)
MCHC RBC AUTO-ENTMCNC: 31.6 G/DL (ref 31.5–35.7)
MCV RBC AUTO: 87.9 FL (ref 79–97)
MONOCYTES # BLD AUTO: 0.99 10*3/MM3 (ref 0.1–0.9)
MONOCYTES NFR BLD AUTO: 7.9 % (ref 5–12)
NEUTROPHILS NFR BLD AUTO: 69 % (ref 42.7–76)
NEUTROPHILS NFR BLD AUTO: 8.66 10*3/MM3 (ref 1.7–7)
NRBC BLD AUTO-RTO: 0 /100 WBC (ref 0–0.2)
PLATELET # BLD AUTO: 287 10*3/MM3 (ref 140–450)
PLATELET # BLD AUTO: 312 10*3/MM3 (ref 140–450)
PMV BLD AUTO: 11.3 FL (ref 6–12)
POTASSIUM SERPL-SCNC: 3.9 MMOL/L (ref 3.5–5.2)
PROT SERPL-MCNC: 7.6 G/DL (ref 6–8.5)
PROTHROMBIN TIME: 12.5 SECONDS (ref 11.7–14.2)
QT INTERVAL: 300 MS
RBC # BLD AUTO: 5.55 10*6/MM3 (ref 3.77–5.28)
SARS-COV-2 RNA RESP QL NAA+PROBE: NOT DETECTED
SODIUM SERPL-SCNC: 138 MMOL/L (ref 136–145)
TROPONIN T SERPL-MCNC: <0.01 NG/ML (ref 0–0.03)
TSH SERPL DL<=0.05 MIU/L-ACNC: 1.02 UIU/ML (ref 0.27–4.2)
WBC # BLD AUTO: 12.56 10*3/MM3 (ref 3.4–10.8)
WHOLE BLOOD HOLD SPECIMEN: NORMAL

## 2021-08-02 PROCEDURE — 96366 THER/PROPH/DIAG IV INF ADDON: CPT

## 2021-08-02 PROCEDURE — 96376 TX/PRO/DX INJ SAME DRUG ADON: CPT

## 2021-08-02 PROCEDURE — 93010 ELECTROCARDIOGRAM REPORT: CPT | Performed by: INTERNAL MEDICINE

## 2021-08-02 PROCEDURE — G0378 HOSPITAL OBSERVATION PER HR: HCPCS

## 2021-08-02 PROCEDURE — 84443 ASSAY THYROID STIM HORMONE: CPT | Performed by: EMERGENCY MEDICINE

## 2021-08-02 PROCEDURE — 85610 PROTHROMBIN TIME: CPT | Performed by: EMERGENCY MEDICINE

## 2021-08-02 PROCEDURE — 99219 PR INITIAL OBSERVATION CARE/DAY 50 MINUTES: CPT | Performed by: INTERNAL MEDICINE

## 2021-08-02 PROCEDURE — 36415 COLL VENOUS BLD VENIPUNCTURE: CPT | Performed by: INTERNAL MEDICINE

## 2021-08-02 PROCEDURE — 96365 THER/PROPH/DIAG IV INF INIT: CPT

## 2021-08-02 PROCEDURE — 71045 X-RAY EXAM CHEST 1 VIEW: CPT

## 2021-08-02 PROCEDURE — 99285 EMERGENCY DEPT VISIT HI MDM: CPT

## 2021-08-02 PROCEDURE — 84484 ASSAY OF TROPONIN QUANT: CPT | Performed by: EMERGENCY MEDICINE

## 2021-08-02 PROCEDURE — 93005 ELECTROCARDIOGRAM TRACING: CPT | Performed by: EMERGENCY MEDICINE

## 2021-08-02 PROCEDURE — 85049 AUTOMATED PLATELET COUNT: CPT | Performed by: INTERNAL MEDICINE

## 2021-08-02 PROCEDURE — U0003 INFECTIOUS AGENT DETECTION BY NUCLEIC ACID (DNA OR RNA); SEVERE ACUTE RESPIRATORY SYNDROME CORONAVIRUS 2 (SARS-COV-2) (CORONAVIRUS DISEASE [COVID-19]), AMPLIFIED PROBE TECHNIQUE, MAKING USE OF HIGH THROUGHPUT TECHNOLOGIES AS DESCRIBED BY CMS-2020-01-R: HCPCS | Performed by: EMERGENCY MEDICINE

## 2021-08-02 PROCEDURE — 85025 COMPLETE CBC W/AUTO DIFF WBC: CPT | Performed by: EMERGENCY MEDICINE

## 2021-08-02 PROCEDURE — 80053 COMPREHEN METABOLIC PANEL: CPT | Performed by: EMERGENCY MEDICINE

## 2021-08-02 PROCEDURE — 93005 ELECTROCARDIOGRAM TRACING: CPT

## 2021-08-02 PROCEDURE — C9803 HOPD COVID-19 SPEC COLLECT: HCPCS

## 2021-08-02 RX ORDER — ATORVASTATIN CALCIUM 20 MG/1
10 TABLET, FILM COATED ORAL DAILY
Status: DISCONTINUED | OUTPATIENT
Start: 2021-08-02 | End: 2021-08-03

## 2021-08-02 RX ORDER — SODIUM CHLORIDE 0.9 % (FLUSH) 0.9 %
10 SYRINGE (ML) INJECTION AS NEEDED
Status: DISCONTINUED | OUTPATIENT
Start: 2021-08-02 | End: 2021-08-05 | Stop reason: HOSPADM

## 2021-08-02 RX ORDER — DILTIAZEM HCL IN NACL,ISO-OSM 125 MG/125
5-15 PLASTIC BAG, INJECTION (ML) INTRAVENOUS CONTINUOUS
Status: DISCONTINUED | OUTPATIENT
Start: 2021-08-02 | End: 2021-08-04

## 2021-08-02 RX ORDER — SODIUM CHLORIDE 0.9 % (FLUSH) 0.9 %
10 SYRINGE (ML) INJECTION EVERY 12 HOURS SCHEDULED
Status: DISCONTINUED | OUTPATIENT
Start: 2021-08-02 | End: 2021-08-05 | Stop reason: HOSPADM

## 2021-08-02 RX ORDER — CLOPIDOGREL BISULFATE 75 MG/1
75 TABLET ORAL DAILY
Status: DISCONTINUED | OUTPATIENT
Start: 2021-08-02 | End: 2021-08-05 | Stop reason: HOSPADM

## 2021-08-02 RX ORDER — ASPIRIN 81 MG/1
81 TABLET, CHEWABLE ORAL NIGHTLY
Status: DISCONTINUED | OUTPATIENT
Start: 2021-08-02 | End: 2021-08-03

## 2021-08-02 RX ORDER — AMLODIPINE BESYLATE 5 MG/1
5 TABLET ORAL DAILY
Status: DISCONTINUED | OUTPATIENT
Start: 2021-08-02 | End: 2021-08-04

## 2021-08-02 RX ORDER — CHOLECALCIFEROL (VITAMIN D3) 125 MCG
5 CAPSULE ORAL NIGHTLY PRN
Status: DISCONTINUED | OUTPATIENT
Start: 2021-08-02 | End: 2021-08-05 | Stop reason: HOSPADM

## 2021-08-02 RX ADMIN — AMLODIPINE BESYLATE 5 MG: 5 TABLET ORAL at 20:44

## 2021-08-02 RX ADMIN — Medication 5 MG/HR: at 14:26

## 2021-08-02 RX ADMIN — ATORVASTATIN CALCIUM 10 MG: 20 TABLET, FILM COATED ORAL at 20:45

## 2021-08-02 RX ADMIN — CLOPIDOGREL BISULFATE 75 MG: 75 TABLET, FILM COATED ORAL at 20:44

## 2021-08-02 RX ADMIN — LOSARTAN POTASSIUM: 50 TABLET, FILM COATED ORAL at 20:44

## 2021-08-02 RX ADMIN — Medication 5 MG: at 21:20

## 2021-08-02 NOTE — ED NOTES
This RN in appropriate ppe while in pt room. Pt wearing mask.        Vidya Ortiz, RN  08/02/21 [id number]

## 2021-08-02 NOTE — H&P
[facility]  History & Physical Note                                                                                    Patient Identification:  Mag Silva:   75 y.o.  female  1945  [id number]            Chief Complaint   Patient presents with   • Neck Pain       Date of Admission: 8/2/21    Admitting Physician:    Reason for Admission:No admission diagnoses are documented for this encounter.,   Chief Complaint   Patient presents with   • Neck Pain       History of Present Illness:     This is a 75-year-old female, who is well-known to our service.  She has a history to include COPD, CAD, dyslipidemia, and hypertension.  She was admitted for throat/neck pain.  Presented to [facility] with throat fullness.  Her symptoms were improved with nitroglycerin. She was noted to be in atrial fibrillation.  She was placed on a Cardizem drip and full dose Lovenox.       On admission troponins negative, creatinine 0.90, potassium 3.9, glucose 221, ALT/AST WNL, TSH 1.020 and hemoglobin 12.56.  This x-ray showed no acute cardiovascular or pulmonary process noted.    Previous echo in 2017 revealed trace MV regurgitation, EF 84.8%, LAC size is borderline dilated and no evidence of pericardial effusion.  Echo in 2015 with angioplasty and stent to the distal RCA.         Cardiac Risk Factors: Age, dyslipidemia, and hypertension    Past Medical History:  Past Medical History:   Diagnosis Date   • Chest pain    • COPD (chronic obstructive pulmonary disease) (CMS/HCC)    • Coronary artery disease    • Crescendo angina (CMS/HCC)    • Dyslipidemia    • Hypertension    • PVC (premature ventricular contraction)    • Syncope     at least 3 stable    Past Surgical History:  Past Surgical History:   Procedure Laterality Date   • BACK SURGERY     • CARDIAC CATHETERIZATION     • CAROTID STENT     • HYSTERECTOMY     • NECK SURGERY          Social History:   Social History     Tobacco Use   • Smoking  status: Former Smoker     Packs/day: 1.50     Years: 20.00     Pack years: 30.00     Types: Cigarettes     Quit date: 2004     Years since quittin.5   Substance Use Topics   • Alcohol use: No        Family History:  Family History   Problem Relation Age of Onset   • Stroke Mother    • Asthma Father    • Cancer Father           Allergies:  Allergies   Allergen Reactions   • Theophyllines        Home Meds:  No current facility-administered medications on file prior to encounter.     Current Outpatient Medications on File Prior to Encounter   Medication Sig Dispense Refill   • Acetaminophen (TYLENOL 8 HOUR ARTHRITIS PAIN PO) Take  by mouth.     • amLODIPine (NORVASC) 5 MG tablet Take 1 tablet by mouth Daily. 30 tablet 11   • aspirin 81 MG chewable tablet Chew 81 mg Every Night.     • atorvastatin (LIPITOR) 10 MG tablet Take 10 mg by mouth Daily.     • Cholecalciferol (VITAMIN D3) 2000 UNITS capsule Take 2,000 Units by mouth Every Night.     • clopidogrel (PLAVIX) 75 MG tablet TAKE 1 TABLET EVERY DAY 90 tablet 3   • famotidine (PEPCID) 20 MG tablet Take 20 mg by mouth 2 (Two) Times a Day As Needed.     • Multiple Vitamins-Minerals (MULTI COMPLETE PO) Take  by mouth.     • nitroglycerin (NITROSTAT) 0.4 MG SL tablet 1 under the tongue as needed for angina, may repeat q5mins for up three doses 25 tablet 3   • olmesartan-hydrochlorothiazide (Benicar HCT) 40-25 MG per tablet Take 1 tablet by mouth Daily. 90 tablet 3   • Omega-3 Fatty Acids (FISH OIL) 1000 MG capsule capsule Take  by mouth Daily With Breakfast.     • tiotropium bromide-olodaterol (STIOLTO RESPIMAT) 2.5-2.5 MCG/ACT aerosol solution inhaler Inhale Daily.     • zolpidem (AMBIEN) 10 MG tablet Take 10 mg by mouth At Night As Needed for sleep.           Scheduled Meds:  Review of Systems  Constitutional: No wt loss, fever   Gastrointestinal: No nausea , abdominal pain  Behavioral/Psych: No insomnia or anxiety   Cardiovascular ----positive for cp. All  "other systems reviewed and are negative            INTAKE AND OUTPUT:  No intake or output data in the 24 hours ending 08/02/21 1633        Review of Systems:            Constitutional:  Temp:  [97.1 °F (36.2 °C)] 97.1 °F (36.2 °C)  Heart Rate:  [] 86  Resp:  [16-18] 18  BP: (118-139)/(69-87) 120/71    Physical Exam:           Physical Exam  /71   Pulse 86   Temp 97.1 °F (36.2 °C) (Tympanic)   Resp 18   Ht 162.6 cm (64\")   Wt 95.7 kg (211 lb)   SpO2 95%   BMI 36.22 kg/m²     General appearance: No acute changes   Eyes: Sclerae conjunctivae normal, pupils reactive   HENT: Atraumatic; oropharynx clear with moist mucous membranes and no mucosal ulcerations;  Neck: Trachea midline; NECK, supple, no thyromegaly or lymphadenopathy   Lungs: Normal size and shape, normal breath sounds, equal distribution of air, no rales and rhonchi   CV: S1-S2 irregular, no murmurs, no rub, no gallop   Abdomen: Soft, nontender; no masses , no abnormal abdominal sounds   Extremities: No deformity , normal color , no peripheral edema   Skin: Normal temperature, turgor and texture; no rash, ulcers  Psych: Appropriate affect, alert and oriented to person, place and time           Cardiographics    ECG:     Telemetry:      ECHO:  Interpretation Summary    · Left atrial cavity size is borderline dilated.  · Left Ventricle: Calculated EF = 84.8%.  · Trace mitral valve regurgitation is present  · There is no evidence of pericardial effusion        FINAL CONCLUSION:  1.  Normal left main.   2.  A 30% to 40% proximal LAD, 40% diagonal.   3.  A 40% OM intrastent stenosis and distal RCA 99% down to 0% with a 3.0 x 15  Xience dilated to 3.2.   4.  Normal left ventriculogram.      DISCUSSION: Results of angioplasty: Successful angioplasty and stent to the  distal RCA reduced from 99% with thrombus down to 0% with a 3.0 x 15 dilated to  3.2 Xience stent    Lab Review:  Results from last 7 days   Lab Units 08/02/21  1414   TROPONIN T " "ng/mL <0.010         Results from last 7 days   Lab Units 08/02/21  1414   SODIUM mmol/L 138   POTASSIUM mmol/L 3.9   BUN mg/dL 19   CREATININE mg/dL 0.90   CALCIUM mg/dL 10.6*        Results from last 7 days   Lab Units 08/02/21  1414   WBC 10*3/mm3 12.56*   HEMOGLOBIN g/dL 15.4   HEMATOCRIT % 48.8*   PLATELETS 10*3/mm3 312     Results from last 7 days   Lab Units 08/02/21  1414   INR  0.95         CXRCONCLUSION: No acute cardiovascular or pulmonary process is  demonstrated.     This report was finalized on 8/2/2021 3:02 PM by Dr. Isrrael Ceballos M.D.     :     The following medical decision was discussed in detail with Dr. Ayala    Assessment / Plan:    Atrial fibrillation with RVR  CAD  Hypertension  hyperlipidemia  COPD  Obesity    Recommendations:  This is a 75-year-old female, who is well-known to our service.  She has a history to include COPD, CAD as well as hypertension.  He presented to [facility] with throat fullness.  She was noted to be in atrial fibrillation with RVR.  She was placed, Cardizem drip and anticoagulated with full dose Lovenox.  Pharmacy to dose.  Troponins have been negative.chest x-ray shows no acute process.  At this time, we will do rate control.  Once stable we will discharge and bring back in a month for cardioversion.    Labs/tests ordered in am: Continue Cardizem drip, Lovenox for atrial fibrillation, continue home medications, surveillance labs, EKG, troponin, diet, internal medicine consult.       Tony Ayala MD    8/2/2021  16:33 EDT    EMR Dragon/Transcription:   \"Dictated utilizing Dragon dictation\".     "

## 2021-08-02 NOTE — ED PROVIDER NOTES
EMERGENCY DEPARTMENT ENCOUNTER    Room Number:  32/32  Date of encounter:  8/2/2021  PCP: Vicki Caceres MD  Historian: Patient, daughter at bedside    I used full protective equipment while examining this patient.  This includes face mask, gloves and protective eyewear.  I washed my hands before entering the room and immediately upon leaving the room      HPI:  Chief Complaint: Throat fullness  A complete HPI/ROS/PMH/PSH/SH/FH are unobtainable due to: None    Context: Mag Silva is a 75 y.o. female who presents to the ED c/o throat fullness.  Patient has had throat fullness off and on since yesterday.  Symptoms will last for several hours at a time and seem to be improved with nitroglycerin.  Patient states she had similar symptoms about 4 5 years ago when she had a heart attack.  Symptoms are currently mild.  She states she went to see her primary care provider and said she had an abnormal EKG and was referred to see Dr. Ayala.  Unfortunately Dr. Ayala is out of town so she came into the ED to be evaluated.      MEDICAL RECORD REVIEW  I reviewed prior medical records including most recent office visit with Dr. Ayala from December of last year. Patient has a history of coronary artery disease, hypertension hyperlipidemia. I see no prior documentation of atrial fibrillation or atrial flutter.    PAST MEDICAL HISTORY  Active Ambulatory Problems     Diagnosis Date Noted   • Coronary arteriosclerosis in native artery 04/28/2016   • Benign essential hypertension 04/28/2016   • Chronic coronary artery disease 03/04/2013   • Chest pain 01/26/2013   • Chronic obstructive pulmonary disease (CMS/HCC) 01/26/2013   • Dyslipidemia 01/26/2013   • Ventricular premature beats 04/28/2016   • Syncope 01/26/2013   • Hyperlipidemia 11/30/2016   • Overweight 12/02/2020     Resolved Ambulatory Problems     Diagnosis Date Noted   • No Resolved Ambulatory Problems     Past Medical History:   Diagnosis Date   •  "COPD (chronic obstructive pulmonary disease) (CMS/[facility])    • Coronary artery disease    • Crescendo angina (CMS/[facility])    • Hypertension    • PVC (premature ventricular contraction)          PAST SURGICAL HISTORY  Past Surgical History:   Procedure Laterality Date   • BACK SURGERY     • CARDIAC CATHETERIZATION     • CAROTID STENT     • HYSTERECTOMY     • NECK SURGERY           FAMILY HISTORY  Family History   Problem Relation Age of Onset   • Stroke Mother    • Asthma Father    • Cancer Father          SOCIAL HISTORY  Social History     Socioeconomic History   • Marital status:      Spouse name: Not on file   • Number of children: Not on file   • Years of education: Not on file   • Highest education level: Not on file   Tobacco Use   • Smoking status: Former Smoker     Packs/day: 1.50     Years: 20.00     Pack years: 30.00     Types: Cigarettes     Quit date: 2004     Years since quittin.5   Substance and Sexual Activity   • Alcohol use: No   • Drug use: No   • Sexual activity: Never         ALLERGIES  Theophyllines       REVIEW OF SYSTEMS  Review of Systems   Constitutional: Positive for fever.   HENT: Negative.  Negative for sore throat.         Patient describes a \"fullness\" in her throat that feels like her throat is closing up.  She has not had difficulty swallowing or difficulty speaking.   Eyes: Negative.    Respiratory: Negative.  Negative for cough.    Cardiovascular: Positive for palpitations. Negative for chest pain.   Gastrointestinal: Negative.    Genitourinary: Negative.  Negative for dysuria.   Musculoskeletal: Positive for back pain (Patient had recent epidural injection which seemed to help her back pain).   Skin: Negative.  Negative for rash.   Neurological: Negative.  Negative for headaches.   All other systems reviewed and are negative.          PHYSICAL EXAM    I have reviewed the triage vital signs and nursing notes.    ED Triage Vitals   Temp Heart Rate Resp BP SpO2   21 " 1340 08/02/21 1340 08/02/21 1340 -- 08/02/21 1341   97.1 °F (36.2 °C) (!) 162 16  97 %      Temp src Heart Rate Source Patient Position BP Location FiO2 (%)   08/02/21 1340 -- -- -- --   Tympanic           Physical Exam  GENERAL: Alert female in no obvious distress, oriented x3.  Triage vitals reviewed notable for elevated pulse of 162.  Initial systolic blood pressure 133.  HENT: nares patent  EYES: no scleral icterus  CV: Irregular irregular and tachycardic  RESPIRATORY: normal effort, clear to auscultation bilaterally-no stridor  ABDOMEN: soft, nontender to palpation-obese  MUSCULOSKELETAL: no deformity, mild bilateral swelling, no significant or is palpation  NEURO: Strength, sensation, and coordination are grossly intact.  Speech and mentation are unremarkable  SKIN: warm, dry-no unusual rashes are noted      LAB RESULTS  Recent Results (from the past 24 hour(s))   ECG 12 Lead    Collection Time: 08/02/21  1:47 PM   Result Value Ref Range    QT Interval 300 ms   Troponin    Collection Time: 08/02/21  2:14 PM    Specimen: Blood   Result Value Ref Range    Troponin T <0.010 0.000 - 0.030 ng/mL   Comprehensive Metabolic Panel    Collection Time: 08/02/21  2:14 PM    Specimen: Blood   Result Value Ref Range    Glucose 221 (H) 65 - 99 mg/dL    BUN 19 8 - 23 mg/dL    Creatinine 0.90 0.57 - 1.00 mg/dL    Sodium 138 136 - 145 mmol/L    Potassium 3.9 3.5 - 5.2 mmol/L    Chloride 101 98 - 107 mmol/L    CO2 25.0 22.0 - 29.0 mmol/L    Calcium 10.6 (H) 8.6 - 10.5 mg/dL    Total Protein 7.6 6.0 - 8.5 g/dL    Albumin 4.50 3.50 - 5.20 g/dL    ALT (SGPT) 21 1 - 33 U/L    AST (SGOT) 21 1 - 32 U/L    Alkaline Phosphatase 71 39 - 117 U/L    Total Bilirubin 0.4 0.0 - 1.2 mg/dL    eGFR Non African Amer 61 >60 mL/min/1.73    Globulin 3.1 gm/dL    A/G Ratio 1.5 g/dL    BUN/Creatinine Ratio 21.1 7.0 - 25.0    Anion Gap 12.0 5.0 - 15.0 mmol/L   TSH    Collection Time: 08/02/21  2:14 PM    Specimen: Blood   Result Value Ref Range    TSH  1.020 0.270 - 4.200 uIU/mL   Protime-INR    Collection Time: 08/02/21  2:14 PM    Specimen: Blood   Result Value Ref Range    Protime 12.5 11.7 - 14.2 Seconds    INR 0.95 0.90 - 1.10   Green Top (Gel)    Collection Time: 08/02/21  2:14 PM   Result Value Ref Range    Extra Tube Hold for add-ons.    Lavender Top    Collection Time: 08/02/21  2:14 PM   Result Value Ref Range    Extra Tube hold for add-on    Gold Top - SST    Collection Time: 08/02/21  2:14 PM   Result Value Ref Range    Extra Tube Hold for add-ons.    CBC Auto Differential    Collection Time: 08/02/21  2:14 PM    Specimen: Blood   Result Value Ref Range    WBC 12.56 (H) 3.40 - 10.80 10*3/mm3    RBC 5.55 (H) 3.77 - 5.28 10*6/mm3    Hemoglobin 15.4 12.0 - 15.9 g/dL    Hematocrit 48.8 (H) 34.0 - 46.6 %    MCV 87.9 79.0 - 97.0 fL    MCH 27.7 26.6 - 33.0 pg    MCHC 31.6 31.5 - 35.7 g/dL    RDW 14.2 12.3 - 15.4 %    RDW-SD 46.0 37.0 - 54.0 fl    MPV 11.3 6.0 - 12.0 fL    Platelets 312 140 - 450 10*3/mm3    Neutrophil % 69.0 42.7 - 76.0 %    Lymphocyte % 22.1 19.6 - 45.3 %    Monocyte % 7.9 5.0 - 12.0 %    Eosinophil % 0.4 0.3 - 6.2 %    Basophil % 0.4 0.0 - 1.5 %    Immature Grans % 0.2 0.0 - 0.5 %    Neutrophils, Absolute 8.66 (H) 1.70 - 7.00 10*3/mm3    Lymphocytes, Absolute 2.78 0.70 - 3.10 10*3/mm3    Monocytes, Absolute 0.99 (H) 0.10 - 0.90 10*3/mm3    Eosinophils, Absolute 0.05 0.00 - 0.40 10*3/mm3    Basophils, Absolute 0.05 0.00 - 0.20 10*3/mm3    Immature Grans, Absolute 0.03 0.00 - 0.05 10*3/mm3    nRBC 0.0 0.0 - 0.2 /100 WBC       Ordered the above labs and independently reviewed the results.      RADIOLOGY  XR Chest 1 View    Result Date: 8/2/2021  XR CHEST 1 VW-  Clinical: Arrhythmia  COMPARISON 8/24/2017  FINDINGS: Atherosclerotic calcification of the aorta, calcified benign granuloma left lung base, cardiac size within normal limits. No effusion, edema or acute airspace disease has developed.  CONCLUSION: No acute cardiovascular or pulmonary  process is demonstrated.  This report was finalized on 8/2/2021 3:02 PM by Dr. Isrrael Ceballos M.D.        I ordered the above noted radiological studies. Reviewed by me and discussed with radiologist.  See dictation for official radiology interpretation.      PROCEDURES  Critical Care  Performed by: Jhony Nunn MD  Authorized by: Jhony Nunn MD     Critical care provider statement:     Critical care time (minutes):  35    Critical care was necessary to treat or prevent imminent or life-threatening deterioration of the following conditions:  Circulatory failure    Critical care was time spent personally by me on the following activities:  Development of treatment plan with patient or surrogate, ordering and review of laboratory studies, ordering and performing treatments and interventions, pulse oximetry, re-evaluation of patient's condition, ordering and review of radiographic studies, review of old charts, examination of patient, evaluation of patient's response to treatment, obtaining history from patient or surrogate and interpretation of cardiac output measurements          MEDICATIONS GIVEN IN ER    Medications   sodium chloride 0.9 % flush 10 mL (has no administration in time range)   dilTIAZem (CARDIZEM) 125 mg in 125 mL 0.7% sodium chloride  infusion (5 mg/hr Intravenous New Bag 8/2/21 1426)   enoxaparin (LOVENOX) syringe 80 mg (has no administration in time range)   dilTIAZem (CARDIZEM) bolus from bag 1 mg/mL 10 mg (10 mg Intravenous Bolus from Bag 8/2/21 1420)         PROGRESS, DATA ANALYSIS, CONSULTS, AND MEDICAL DECISION MAKING    All labs have been independently reviewed by me.  All radiology studies have been reviewed by me and discussed with radiologist dictating the report.   EKG's independently viewed and interpreted by me.  Discussion below represents my analysis of pertinent findings related to patient's condition, differential diagnosis, treatment plan and final disposition.      ED  Course as of Aug 02 1644   Mon Aug 02, 2021   1358 EKG          EKG time: 1347  Rhythm/Rate: A-flutter 126  P waves and KS: Atrial flutter  QRS, axis: Normal QRS, normal axis  ST and T waves: Lateral ST depression    Interpreted Contemporaneously by me, independently viewed  Atrial flutter is new and heart rate is increased changed compared to prior 2017      [DB]   1358 IVN-13-zefo-old female with history of remote MI presents with fullness in her throat off and on for last several days.  On exam she is noted to have pretty significant tachycardia with atrial flutter rates varying from 120s to 160s.  Patient is minimally symptomatic currently.  I suspect her fullness in her throat is a manifestation of her tacky arrhythmia.  She certainly could be having some acute coronary syndrome as well and will check a troponin.    We will go ahead and start patient on IV diltiazem drip and bolus to help achieve rate control.    [DB]   1413 Labs are reviewed and are fairly unremarkable.  There is a slightly elevated white count of 12.6 which I think is not related to any bacterial infection.  Chemistries are relatively benign with normal BUN and creatinine.  Troponin and TSH are at baseline.    [DB]   1536 Chest x-ray discussed with Dr. Isrrael Ceballos shows no acute disease within the chest.    [DB]   1536 Patient had control of heart rate with IV diltiazem.  Will discuss management of this patient with Dr. Ayala.    [DB]   1641 I discussed evaluation of this patient with Dr. Ayala who will admit for further evaluation treatment.  We will give a dose of Lovenox subcu.    [DB]      ED Course User Index  [DB] Jhony Nunn MD       AS OF 16:44 EDT VITALS:    BP - 120/71  HR - 86  TEMP - 97.1 °F (36.2 °C) (Tympanic)  O2 SATS - 95%      DIAGNOSIS  Final diagnoses:   New onset atrial fibrillation (CMS/HCC)   Atrial fibrillation with RVR (CMS/HCC)         DISPOSITION  Admission       Jhony Nunn MD  08/02/21  4492

## 2021-08-02 NOTE — PROGRESS NOTES
"[facility] Clinical Pharmacy Services: Enoxaparin Dosing    Pharmacy has been consulted to dose enoxaparin for Mag Silva for an indication of atrial fibrillation per JENNIFER Hall's request.    Relevant clinical data and objective history reviewed:  75 y.o. female 162.6 cm (64\") 95.7 kg (211 lb)    Home Anticoagulation/Antiplatelet: Aspirin 81 mg daily, clopidogrel 75 mg daily    Lab Results   Component Value Date    WBC 12.56 (H) 08/02/2021    HGB 15.4 08/02/2021    HCT 48.8 (H) 08/02/2021    MCV 87.9 08/02/2021     08/02/2021     Lab Results   Component Value Date    GLUCOSE 221 (H) 08/02/2021    CALCIUM 10.6 (H) 08/02/2021     08/02/2021    K 3.9 08/02/2021    CO2 25.0 08/02/2021     08/02/2021    BUN 19 08/02/2021    CREATININE 0.90 08/02/2021    EGFRIFNONA 61 08/02/2021    BCR 21.1 08/02/2021    ANIONGAP 12.0 08/02/2021     Estimated Creatinine Clearance: 61.6 mL/min (by C-G formula based on SCr of 0.9 mg/dL).    Assessment/Plan    1. Will start the patient on an enoxaparin dose of 100 mg (1 mg/kg) SC every 12 hours based on the indication of atrial fibrillation, weight, and patient's renal function (CrCl >30 mL/min). This will start tomorrow morning as the patient already received tonight's dose.     2. Platelet count is currently stable at 312. Platelets should be drawn every 3 days while the patient is on enoxaparin per system policy.    Thank you for allowing me to participate in your patient's care. Please call pharmacy with any questions or concerns.  Lynnette Crowley, Pharm.D., [facility]   Clinical Staff Pharmacist  "

## 2021-08-02 NOTE — ED TRIAGE NOTES
Neck pain since last night.  Denies cp.  Pt has had 2 stents placed.  Took 2 nitro 30 minutes pta    Patient was placed in face mask during first look triage.  Patient was wearing a face mask throughout encounter.  I wore personal protective equipment throughout the encounter.  Hand hygiene was performed before and after patient encounter.

## 2021-08-03 LAB
ANION GAP SERPL CALCULATED.3IONS-SCNC: 10.4 MMOL/L (ref 5–15)
BASOPHILS # BLD AUTO: 0.05 10*3/MM3 (ref 0–0.2)
BASOPHILS NFR BLD AUTO: 0.4 % (ref 0–1.5)
BUN SERPL-MCNC: 19 MG/DL (ref 8–23)
BUN/CREAT SERPL: 28.8 (ref 7–25)
CALCIUM SPEC-SCNC: 9.9 MG/DL (ref 8.6–10.5)
CHLORIDE SERPL-SCNC: 103 MMOL/L (ref 98–107)
CO2 SERPL-SCNC: 25.6 MMOL/L (ref 22–29)
CREAT SERPL-MCNC: 0.66 MG/DL (ref 0.57–1)
DEPRECATED RDW RBC AUTO: 43.6 FL (ref 37–54)
EOSINOPHIL # BLD AUTO: 0.09 10*3/MM3 (ref 0–0.4)
EOSINOPHIL NFR BLD AUTO: 0.8 % (ref 0.3–6.2)
ERYTHROCYTE [DISTWIDTH] IN BLOOD BY AUTOMATED COUNT: 13.9 % (ref 12.3–15.4)
GFR SERPL CREATININE-BSD FRML MDRD: 88 ML/MIN/1.73
GLUCOSE SERPL-MCNC: 133 MG/DL (ref 65–99)
HCT VFR BLD AUTO: 42.2 % (ref 34–46.6)
HGB BLD-MCNC: 13.8 G/DL (ref 12–15.9)
IMM GRANULOCYTES # BLD AUTO: 0.05 10*3/MM3 (ref 0–0.05)
IMM GRANULOCYTES NFR BLD AUTO: 0.4 % (ref 0–0.5)
LYMPHOCYTES # BLD AUTO: 2.5 10*3/MM3 (ref 0.7–3.1)
LYMPHOCYTES NFR BLD AUTO: 21.5 % (ref 19.6–45.3)
MAGNESIUM SERPL-MCNC: 2 MG/DL (ref 1.6–2.4)
MCH RBC QN AUTO: 28.3 PG (ref 26.6–33)
MCHC RBC AUTO-ENTMCNC: 32.7 G/DL (ref 31.5–35.7)
MCV RBC AUTO: 86.5 FL (ref 79–97)
MONOCYTES # BLD AUTO: 1.03 10*3/MM3 (ref 0.1–0.9)
MONOCYTES NFR BLD AUTO: 8.9 % (ref 5–12)
NEUTROPHILS NFR BLD AUTO: 68 % (ref 42.7–76)
NEUTROPHILS NFR BLD AUTO: 7.91 10*3/MM3 (ref 1.7–7)
NRBC BLD AUTO-RTO: 0 /100 WBC (ref 0–0.2)
PLATELET # BLD AUTO: 261 10*3/MM3 (ref 140–450)
PMV BLD AUTO: 11.2 FL (ref 6–12)
POTASSIUM SERPL-SCNC: 4.5 MMOL/L (ref 3.5–5.2)
RBC # BLD AUTO: 4.88 10*6/MM3 (ref 3.77–5.28)
SODIUM SERPL-SCNC: 139 MMOL/L (ref 136–145)
TROPONIN T SERPL-MCNC: <0.01 NG/ML (ref 0–0.03)
WBC # BLD AUTO: 11.63 10*3/MM3 (ref 3.4–10.8)

## 2021-08-03 PROCEDURE — 99225 PR SBSQ OBSERVATION CARE/DAY 25 MINUTES: CPT | Performed by: NURSE PRACTITIONER

## 2021-08-03 PROCEDURE — 25010000002 ENOXAPARIN PER 10 MG: Performed by: NURSE PRACTITIONER

## 2021-08-03 PROCEDURE — 96366 THER/PROPH/DIAG IV INF ADDON: CPT

## 2021-08-03 PROCEDURE — 84484 ASSAY OF TROPONIN QUANT: CPT | Performed by: NURSE PRACTITIONER

## 2021-08-03 PROCEDURE — 83735 ASSAY OF MAGNESIUM: CPT | Performed by: NURSE PRACTITIONER

## 2021-08-03 PROCEDURE — 85025 COMPLETE CBC W/AUTO DIFF WBC: CPT | Performed by: NURSE PRACTITIONER

## 2021-08-03 PROCEDURE — 96372 THER/PROPH/DIAG INJ SC/IM: CPT

## 2021-08-03 PROCEDURE — G0378 HOSPITAL OBSERVATION PER HR: HCPCS

## 2021-08-03 PROCEDURE — 93005 ELECTROCARDIOGRAM TRACING: CPT | Performed by: NURSE PRACTITIONER

## 2021-08-03 PROCEDURE — 80048 BASIC METABOLIC PNL TOTAL CA: CPT | Performed by: NURSE PRACTITIONER

## 2021-08-03 PROCEDURE — 93010 ELECTROCARDIOGRAM REPORT: CPT | Performed by: INTERNAL MEDICINE

## 2021-08-03 RX ORDER — ASPIRIN 81 MG/1
81 TABLET, CHEWABLE ORAL DAILY
Status: DISCONTINUED | OUTPATIENT
Start: 2021-08-03 | End: 2021-08-05 | Stop reason: HOSPADM

## 2021-08-03 RX ORDER — PANTOPRAZOLE SODIUM 40 MG/1
40 TABLET, DELAYED RELEASE ORAL
Status: DISCONTINUED | OUTPATIENT
Start: 2021-08-04 | End: 2021-08-05 | Stop reason: HOSPADM

## 2021-08-03 RX ORDER — ATORVASTATIN CALCIUM 20 MG/1
10 TABLET, FILM COATED ORAL NIGHTLY
Status: DISCONTINUED | OUTPATIENT
Start: 2021-08-03 | End: 2021-08-05 | Stop reason: HOSPADM

## 2021-08-03 RX ADMIN — ASPIRIN 81 MG: 81 TABLET, CHEWABLE ORAL at 10:24

## 2021-08-03 RX ADMIN — ENOXAPARIN SODIUM 100 MG: 100 INJECTION, SOLUTION INTRAVENOUS; SUBCUTANEOUS at 08:37

## 2021-08-03 RX ADMIN — AMLODIPINE BESYLATE 5 MG: 5 TABLET ORAL at 10:24

## 2021-08-03 RX ADMIN — SODIUM CHLORIDE, PRESERVATIVE FREE 10 ML: 5 INJECTION INTRAVENOUS at 21:09

## 2021-08-03 RX ADMIN — Medication 5 MG/HR: at 08:44

## 2021-08-03 RX ADMIN — ENOXAPARIN SODIUM 100 MG: 100 INJECTION, SOLUTION INTRAVENOUS; SUBCUTANEOUS at 21:09

## 2021-08-03 RX ADMIN — LOSARTAN POTASSIUM: 50 TABLET, FILM COATED ORAL at 10:24

## 2021-08-03 RX ADMIN — ATORVASTATIN CALCIUM 10 MG: 20 TABLET, FILM COATED ORAL at 21:09

## 2021-08-03 RX ADMIN — SODIUM CHLORIDE, PRESERVATIVE FREE 10 ML: 5 INJECTION INTRAVENOUS at 08:46

## 2021-08-03 RX ADMIN — CLOPIDOGREL BISULFATE 75 MG: 75 TABLET, FILM COATED ORAL at 10:25

## 2021-08-03 NOTE — CASE MANAGEMENT/SOCIAL WORK
Continued Stay Note  [facility]     Patient Name: Mag Silva  MRN: [id number]  Today's Date: 8/3/2021    Admit Date: 8/2/2021    Discharge Plan     Row Name 08/03/21 1537       Plan    Plan  Home. Enrolled in medsto bed to check Eliquis coverage. Denies any needs. Family to transport at D/C.    Patient/Family in Agreement with Plan  yes    Plan Comments  Met with pt. at bedside. Explained roll of . Face sheet and pharmacy verified. Pt lives alone but states her daughter lives 2 doors down from her. There are 3 steps to enter home. Denies the use of any DME.  Pt is independent with ADLs. Pt has never been to Rehab or used HH. Pt’s PCP is Dr. CHRIS Caceres. Pt enrolled with [facility] to check coverage/cost of Eliquis.  Pt drives herself to appointments. At discharge, family will transport. Pt denies any discharge needs at this time. Explained that CCP would follow to assess for discharge needs.  Marcello Vieira RN-BC        Discharge Codes    No documentation.             Marcello Vieira RN

## 2021-08-03 NOTE — PLAN OF CARE
Goal Outcome Evaluation:  Plan of Care Reviewed With: patient        Progress: improving     Patient continues on Cardizem GTT at 5mg/hr.  Pulse improved. PRN melatonin ordered.  No s/s of distress noted at this time.  All safety measures in place.  Will continue to monitor.

## 2021-08-03 NOTE — CASE MANAGEMENT/SOCIAL WORK
Discharge Planning Assessment  [facility]     Patient Name: Mag Silva  MRN: [id number]  Today's Date: 8/3/2021    Admit Date: 8/2/2021    Discharge Needs Assessment     Row Name 08/03/21 1534       Living Environment    Lives With  alone    Unique Family Situation  States her daughter lives 2 doors down from her    Current Living Arrangements  home/apartment/condo    Primary Care Provided by  self    Provides Primary Care For  no one    Family Caregiver if Needed  child(antonio), adult    Family Caregiver Names  Edel Villareal, daughter,  [phone]    Quality of Family Relationships  unable to assess    Able to Return to Prior Arrangements  yes       Resource/Environmental Concerns    Resource/Environmental Concerns  none       Transition Planning    Patient/Family Anticipates Transition to  home    Patient/Family Anticipated Services at Transition  none    Transportation Anticipated  family or friend will provide       Discharge Needs Assessment    Readmission Within the Last 30 Days  no previous admission in last 30 days    Equipment Currently Used at Home  none    Concerns to be Addressed  denies needs/concerns at this time;discharge planning;care coordination/care conferences;decision-making    Anticipated Changes Related to Illness  none    Equipment Needed After Discharge  none    Provided Post Acute Provider List?  N/A    Provided Post Acute Provider Quality & Resource List?  N/A        Discharge Plan     Row Name 08/03/21 2746       Plan    Plan  Home. Enrolled in medsto bed to check Eliquis coverage. Denies any needs. Family to transport at D/C.    Patient/Family in Agreement with Plan  yes    Plan Comments  Met with pt. at bedside. Explained roll of . Face sheet and pharmacy verified. Pt lives alone but states her daughter lives 2 doors down from her. There are 3 steps to enter home. Denies the use of any DME.  Pt is independent with ADLs. Pt has never been to Rehab or used HH. Pt’s PCP  is Dr. CHRIS Caceres. Pt enrolled with [facility] to check coverage/cost of Eliquis.  Pt drives herself to appointments. At discharge, family will transport. Pt denies any discharge needs at this time. Explained that CCP would follow to assess for discharge needs.  Marcello Vieira RN-BC        Continued Care and Services - Admitted Since 8/2/2021    Coordination has not been started for this encounter.         Demographic Summary     Row Name 08/03/21 1529       General Information    Admission Type  observation    Arrived From  emergency department    Required Notices Provided  Observation Status Notice    Reason for Consult  discharge planning;decision-making;care coordination/care conference    Preferred Language  English     Used During This Interaction  no        Functional Status     Row Name 08/03/21 1530       Functional Status    Usual Activity Tolerance  good    Current Activity Tolerance  good       Functional Status, IADL    Medications  independent    Meal Preparation  independent    Housekeeping  independent    Laundry  independent    Shopping  independent       Mental Status    General Appearance WDL  WDL       Mental Status Summary    Recent Changes in Mental Status/Cognitive Functioning  no changes                Marcello Vieira RN

## 2021-08-03 NOTE — PLAN OF CARE
Goal Outcome Evaluation:     VSS. Pt remains on cardizem drip. Rate consistently below 100 all day. Plan to switch to oral meds tomorrow. Pt resting comfortably. One episode this afternoon of sweating and palpitations while in bathroom washing up.

## 2021-08-03 NOTE — PROGRESS NOTES
[facility]  Cardiology Progress Note    Patient Identification:  Name: Mag Silva  Age: 75 y.o.  Sex: female  :  1945  MRN: [id number]                 Follow Up / Chief Complaint: Follow-up for neck pain/atrial fibrillation    Interval History: Cardizem drip will switch to p.o. tomorrow       Subjective: Denies chest pain, and shortness of breath.      Objective:    Past Medical History:  Past Medical History:   Diagnosis Date   • Chest pain    • COPD (chronic obstructive pulmonary disease) (CMS/[facility])    • Coronary artery disease    • Crescendo angina (CMS/[facility])    • Dyslipidemia    • Hypertension    • PVC (premature ventricular contraction)    • Syncope      Past Surgical History:  Past Surgical History:   Procedure Laterality Date   • BACK SURGERY     • CARDIAC CATHETERIZATION     • CAROTID STENT     • HYSTERECTOMY     • NECK SURGERY          Social History:   Social History     Tobacco Use   • Smoking status: Former Smoker     Packs/day: 1.50     Years: 20.00     Pack years: 30.00     Types: Cigarettes     Quit date: 2004     Years since quittin.5   Substance Use Topics   • Alcohol use: No      Family History:  Family History   Problem Relation Age of Onset   • Stroke Mother    • Asthma Father    • Cancer Father           Allergies:  Allergies   Allergen Reactions   • Theophyllines      Scheduled Meds:  amLODIPine, 5 mg, Daily  aspirin, 81 mg, Daily  atorvastatin, 10 mg, Nightly  clopidogrel, 75 mg, Daily  enoxaparin, 1 mg/kg, Q12H  enoxaparin, 80 mg, Once  losartan-HCTZ 100-25 combo dose, , Daily  sodium chloride, 10 mL, Q12H            INTAKE AND OUTPUT:    Intake/Output Summary (Last 24 hours) at 8/3/2021 1435  Last data filed at 8/3/2021 1322  Gross per 24 hour   Intake 840 ml   Output --   Net 840 ml       Review of Systems:   GI: Denies nausea and vomiting  Cardiac: Denies chest pain and palpitations   pulmonary: Denies shortness of breath and  cough    Constitutional:  Temp:  [97.6 °F (36.4 °C)-97.8 °F (36.6 °C)] 97.8 °F (36.6 °C)  Heart Rate:  [76-93] 85  Resp:  [16-18] 18  BP: (118-147)/(71-99) 125/72    Physical Exam:  General:  Appears in no acute distress, resting in bed  Eyes: EOM normal no conjunctival drainage  HEENT:  No JVD. Thyroid not visibly enlarged. No mucosal pallor or cyanosis  Respiratory: Respirations regular and unlabored at rest. BBS with good air anteriorly and laterally. No crackles, rubs or wheezes auscultated  Cardiovascular: S1S2 irregularly irregular rhythm. No murmur, rub or gallop. No carotid bruits. DP/PT pulses +2    . No pretibial pitting edema  Gastrointestinal: Abdomen soft, flat, non tender. Bowel sounds present. No hepatosplenomegaly. No ascites  Musculoskeletal: POOLE x4. No abnormal movements   Neuro: AAO x3 CN II-XII grossly intact  Psych: Mood and affect normal, pleasant and cooperative          Cardiographics  Telemetry:     ECG:              Echocardiogram:   Interpretation Summary     · Left atrial cavity size is borderline dilated.  · Left Ventricle: Calculated EF = 84.8%.  · Trace mitral valve regurgitation is present  · There is no evidence of pericardial effusion         FINAL CONCLUSION:  1.  Normal left main.   2.  A 30% to 40% proximal LAD, 40% diagonal.   3.  A 40% OM intrastent stenosis and distal RCA 99% down to 0% with a 3.0 x 15  Xience dilated to 3.2.   4.  Normal left ventriculogram.      DISCUSSION: Results of angioplasty: Successful angioplasty and stent to the  distal RCA reduced from 99% with thrombus down to 0% with a 3.0 x 15 dilated to  3.2 Xience stent       Lab Review   Results from last 7 days   Lab Units 08/03/21  0438 08/02/21  1414   TROPONIN T ng/mL <0.010 <0.010     Results from last 7 days   Lab Units 08/03/21  0438   MAGNESIUM mg/dL 2.0     Results from last 7 days   Lab Units 08/03/21  0438   SODIUM mmol/L 139   POTASSIUM mmol/L 4.5   BUN mg/dL 19   CREATININE mg/dL 0.66   CALCIUM  "mg/dL 9.9        Results from last 7 days   Lab Units 08/03/21  0438 08/02/21  2035 08/02/21  1414   WBC 10*3/mm3 11.63*  --  12.56*   HEMOGLOBIN g/dL 13.8  --  15.4   HEMATOCRIT % 42.2  --  48.8*   PLATELETS 10*3/mm3 261 287 312     Results from last 7 days   Lab Units 08/02/21  1414   INR  0.95         Assessment:  1.  Atrial fibrillation with RVR  2.  CAD: Previous ischemic work-up as above.  3.  Hypertension: Stable  4.  Hyperlipidemia: Lipid panel in 2017 was within normal limits.  We will do a CMP lipid profile tomorrow.  5.  COPD  6.  Obesity      Plan:  Blood pressure and heart rate are stable.  She is atrial fibrillation with rate controlled.  We will switch to p.o. Cardizem tomorrow along with Eliquis.  Possible home tomorrow.      Labs/tests ordered for am: Lipid profile, CMP in a.m., and medication adjustments    )8/3/2021  JENNIFER Bland/Transcription:   \"Dictated utilizing Dragon dictation\".     "

## 2021-08-03 NOTE — CONSULTS
Internal medicine consult    Referring physician  Dr. KENNEY    Primary care physician  Dr. Caceres    Chief complaint  Throat fullness    Reason for consult  Follow medical problems    History of present illness  75-year-old female with COPD coronary artery disease hypertension hyperlipidemia and gastroesophageal disease presented to [facility] emergency room with throat fullness.  Patient has off-and-on palpitation but no chest pain or increased shortness of breath.  Patient also denies any fever chills cough congestion night sweats weight loss or weight gain.  Patient work-up in ER revealed new onset atrial fibrillation admitted to cardiology service and I am asked to follow the patient for medical problem.  At the time of interview she has no complaints except being in the hospital and wants to go home.    PAST MEDICAL HISTORY  • COPD      • Coronary artery disease     • Gastroesophageal reflux disease     • Hypertension     • Hyperlipidemia        PAST SURGICAL HISTORY              Procedure Laterality Date   • BACK SURGERY       • CARDIAC CATHETERIZATION       • CAROTID STENT       • HYSTERECTOMY       • NECK SURGERY             FAMILY HISTORY           Problem Relation Age of Onset   • Stroke Mother     • Asthma Father     • Cancer Father        SOCIAL HISTORY                 Socioeconomic History   • Marital status:        Spouse name: Not on file   • Number of children: Not on file   • Years of education: Not on file   • Highest education level: Not on file   Tobacco Use   • Smoking status: Former Smoker       Packs/day: 1.50       Years: 20.00       Pack years: 30.00       Types: Cigarettes       Quit date: 2004       Years since quittin.5   Substance and Sexual Activity   • Alcohol use: No   • Drug use: No   • Sexual activity: Never         ALLERGIES  Theophyllines  Home medications reviewed     REVIEW OF SYSTEMS  Constitutional: Negative for fever  HENT:  Fullness in throat  Eyes:  "Negative.    Respiratory: Negative.  Negative for cough.    Cardiovascular: Positive for palpitations. Negative for chest pain.   Gastrointestinal: Negative.    Genitourinary: Negative.  Negative for dysuria.   Musculoskeletal: Positive for back pain   Skin: Negative.  Negative for rash.   Neurological: Negative.  Negative for headaches.   All other systems reviewed and are negative.     PHYSICAL EXAM   Blood pressure 125/72, pulse 85, temperature 97.8 °F (36.6 °C), temperature source Oral, resp. rate 18, height 162.6 cm (64\"), weight 95.7 kg (211 lb), SpO2 94 %.    GENERAL: Alert female in no obvious distress  HENT: nares patent  EYES: no scleral icterus  CV: Irregular irregular and tachycardic  RESPIRATORY: normal effort, clear to auscultation bilaterally  ABDOMEN: soft, nontender bowel sounds positive  MUSCULOSKELETAL: no deformity, mild bilateral swelling, no significant or is palpation  NEURO: Strength, sensation, and coordination are grossly intact.  Speech and mentation are unremarkable  SKIN: warm, dry-no unusual rashes are noted     LAB RESULTS  Lab Results (last 24 hours)     Procedure Component Value Units Date/Time    Basic Metabolic Panel [[id number]]  (Abnormal) Collected: 08/03/21 0438    Specimen: Blood Updated: 08/03/21 0559     Glucose 133 mg/dL      BUN 19 mg/dL      Creatinine 0.66 mg/dL      Sodium 139 mmol/L      Potassium 4.5 mmol/L      Chloride 103 mmol/L      CO2 25.6 mmol/L      Calcium 9.9 mg/dL      eGFR Non African Amer 88 mL/min/1.73      BUN/Creatinine Ratio 28.8     Anion Gap 10.4 mmol/L     Narrative:      GFR Normal >60  Chronic Kidney Disease <60  Kidney Failure <15      Magnesium [[id number]]  (Normal) Collected: 08/03/21 0438    Specimen: Blood Updated: 08/03/21 0558     Magnesium 2.0 mg/dL     Troponin [[id number]]  (Normal) Collected: 08/03/21 0438    Specimen: Blood Updated: 08/03/21 0557     Troponin T <0.010 ng/mL     Narrative:      Troponin T Reference Range:  <= 0.03 ng/mL- "   Negative for AMI  >0.03 ng/mL-     Abnormal for myocardial necrosis.  Clinicians would have to utilize clinical acumen, EKG, Troponin and serial changes to determine if it is an Acute Myocardial Infarction or myocardial injury due to an underlying chronic condition.       Results may be falsely decreased if patient taking Biotin.      CBC & Differential [[id number]]  (Abnormal) Collected: 08/03/21 0438    Specimen: Blood Updated: 08/03/21 0512    Narrative:      The following orders were created for panel order CBC & Differential.  Procedure                               Abnormality         Status                     ---------                               -----------         ------                     CBC Auto Differential[[id number]]        Abnormal            Final result                 Please view results for these tests on the individual orders.    CBC Auto Differential [[id number]]  (Abnormal) Collected: 08/03/21 0438    Specimen: Blood Updated: 08/03/21 0512     WBC 11.63 10*3/mm3      RBC 4.88 10*6/mm3      Hemoglobin 13.8 g/dL      Hematocrit 42.2 %      MCV 86.5 fL      MCH 28.3 pg      MCHC 32.7 g/dL      RDW 13.9 %      RDW-SD 43.6 fl      MPV 11.2 fL      Platelets 261 10*3/mm3      Neutrophil % 68.0 %      Lymphocyte % 21.5 %      Monocyte % 8.9 %      Eosinophil % 0.8 %      Basophil % 0.4 %      Immature Grans % 0.4 %      Neutrophils, Absolute 7.91 10*3/mm3      Lymphocytes, Absolute 2.50 10*3/mm3      Monocytes, Absolute 1.03 10*3/mm3      Eosinophils, Absolute 0.09 10*3/mm3      Basophils, Absolute 0.05 10*3/mm3      Immature Grans, Absolute 0.05 10*3/mm3      nRBC 0.0 /100 WBC     Platelet Count [[id number]]  (Normal) Collected: 08/02/21 2035    Specimen: Blood Updated: 08/02/21 2140     Platelets 287 10*3/mm3               Study Result       XR CHEST 1 VW-     Clinical: Arrhythmia     COMPARISON 8/24/2017     FINDINGS: Atherosclerotic calcification of the aorta, calcified benign  granuloma left  lung base, cardiac size within normal limits. No  effusion, edema or acute airspace disease has developed.     CONCLUSION: No acute cardiovascular or pulmonary process is  demonstrated.        ECG 12 Lead  Component   Ref Range & Units 8/3/21 0733 8/2/21 1347   QT Interval   ms 425 P  300         HEART RATE= 70  bpm  RR Interval= 857  ms  VT Interval=   ms  P Horizontal Axis=   deg  P Front Axis=   deg  QRSD Interval= 78  ms  QT Interval= 425  ms  QRS Axis= 9  deg  T Wave Axis= -67  deg  - ABNORMAL ECG -  Atrial fibrillation  Inferior infarct, age indeterminate             Current Facility-Administered Medications:   •  amLODIPine (NORVASC) tablet 5 mg, 5 mg, Oral, Daily, Bri Hall APRN, 5 mg at 08/03/21 1024  •  [START ON 8/4/2021] apixaban (ELIQUIS) tablet 5 mg, 5 mg, Oral, Q12H, Bri Hall APRN  •  aspirin chewable tablet 81 mg, 81 mg, Oral, Daily, Perry Frances MD, 81 mg at 08/03/21 1024  •  atorvastatin (LIPITOR) tablet 10 mg, 10 mg, Oral, Nightly, Perry Frances MD  •  clopidogrel (PLAVIX) tablet 75 mg, 75 mg, Oral, Daily, Bri Hall APRN, 75 mg at 08/03/21 1025  •  dilTIAZem (CARDIZEM) 125 mg in 125 mL 0.7% sodium chloride  infusion, 5-15 mg/hr, Intravenous, Continuous, EdouardJhony MD, Last Rate: 5 mL/hr at 08/03/21 0844, 5 mg/hr at 08/03/21 0844  •  enoxaparin (LOVENOX) syringe 100 mg, 1 mg/kg, Subcutaneous, Q12H, Bri Hall APRN  •  enoxaparin (LOVENOX) syringe 80 mg, 80 mg, Subcutaneous, Once, EdouardJhony kelly MD  •  losartan (COZAAR) 100 mg, hydroCHLOROthiazide (HYDRODIURIL) 25 mg, , Oral, Daily, Bri Hall APRN, Given at 08/03/21 1024  •  melatonin tablet 5 mg, 5 mg, Oral, Nightly PRN, Bri Vargas APRN, 5 mg at 08/02/21 2120  •  Pharmacy to Dose enoxaparin (LOVENOX), , Does not apply, Continuous PRN, Bri Hall APRN  •  sodium chloride 0.9 % flush 10 mL, 10 mL, Intravenous, PRN, Jhony Nunn MD  •  sodium chloride 0.9 % flush  10 mL, 10 mL, Intravenous, Q12H, Bri Hall APRN, 10 mL at 08/03/21 0846  •  sodium chloride 0.9 % flush 10 mL, 10 mL, Intravenous, PRN, Bri Hall APRN     ASSESSMENT  New onset atrial fibrillation with rapid ventricular rate  Coronary artery disease  COPD  Hypertension  Hyperlipidemia  Gastroesophageal reflux disease    PLAN  Agree with current care  Control the heart rate  Anticoagulation  Continue home medications  Check thyroid profile lipid profile and hemoglobin A1c  Stress ulcer DVT prophylaxis  Supportive care  Patient is full code  Discussed with nursing staff  We will follow with Dr. KENNEY and further recommendation current hospital course    NADIR DUBOSE MD

## 2021-08-04 LAB
ALBUMIN SERPL-MCNC: 4 G/DL (ref 3.5–5.2)
ALBUMIN/GLOB SERPL: 1.5 G/DL
ALP SERPL-CCNC: 69 U/L (ref 39–117)
ALT SERPL W P-5'-P-CCNC: 16 U/L (ref 1–33)
ANION GAP SERPL CALCULATED.3IONS-SCNC: 12.1 MMOL/L (ref 5–15)
AST SERPL-CCNC: 14 U/L (ref 1–32)
BASOPHILS # BLD AUTO: 0.05 10*3/MM3 (ref 0–0.2)
BASOPHILS NFR BLD AUTO: 0.4 % (ref 0–1.5)
BILIRUB SERPL-MCNC: 0.5 MG/DL (ref 0–1.2)
BUN SERPL-MCNC: 21 MG/DL (ref 8–23)
BUN/CREAT SERPL: 32.3 (ref 7–25)
CALCIUM SPEC-SCNC: 9.5 MG/DL (ref 8.6–10.5)
CHLORIDE SERPL-SCNC: 105 MMOL/L (ref 98–107)
CHOLEST SERPL-MCNC: 124 MG/DL (ref 0–200)
CO2 SERPL-SCNC: 21.9 MMOL/L (ref 22–29)
CREAT SERPL-MCNC: 0.65 MG/DL (ref 0.57–1)
DEPRECATED RDW RBC AUTO: 44.2 FL (ref 37–54)
EOSINOPHIL # BLD AUTO: 0.12 10*3/MM3 (ref 0–0.4)
EOSINOPHIL NFR BLD AUTO: 0.9 % (ref 0.3–6.2)
ERYTHROCYTE [DISTWIDTH] IN BLOOD BY AUTOMATED COUNT: 14.1 % (ref 12.3–15.4)
GFR SERPL CREATININE-BSD FRML MDRD: 89 ML/MIN/1.73
GLOBULIN UR ELPH-MCNC: 2.6 GM/DL
GLUCOSE SERPL-MCNC: 124 MG/DL (ref 65–99)
HBA1C MFR BLD: 6.21 % (ref 4.8–5.6)
HCT VFR BLD AUTO: 43.6 % (ref 34–46.6)
HDLC SERPL-MCNC: 37 MG/DL (ref 40–60)
HGB BLD-MCNC: 14.2 G/DL (ref 12–15.9)
IMM GRANULOCYTES # BLD AUTO: 0.05 10*3/MM3 (ref 0–0.05)
IMM GRANULOCYTES NFR BLD AUTO: 0.4 % (ref 0–0.5)
LDLC SERPL CALC-MCNC: 55 MG/DL (ref 0–100)
LDLC/HDLC SERPL: 1.31 {RATIO}
LYMPHOCYTES # BLD AUTO: 2.24 10*3/MM3 (ref 0.7–3.1)
LYMPHOCYTES NFR BLD AUTO: 17.7 % (ref 19.6–45.3)
MAGNESIUM SERPL-MCNC: 2 MG/DL (ref 1.6–2.4)
MCH RBC QN AUTO: 28.1 PG (ref 26.6–33)
MCHC RBC AUTO-ENTMCNC: 32.6 G/DL (ref 31.5–35.7)
MCV RBC AUTO: 86.3 FL (ref 79–97)
MONOCYTES # BLD AUTO: 1.08 10*3/MM3 (ref 0.1–0.9)
MONOCYTES NFR BLD AUTO: 8.5 % (ref 5–12)
NEUTROPHILS NFR BLD AUTO: 72.1 % (ref 42.7–76)
NEUTROPHILS NFR BLD AUTO: 9.11 10*3/MM3 (ref 1.7–7)
NRBC BLD AUTO-RTO: 0 /100 WBC (ref 0–0.2)
NT-PROBNP SERPL-MCNC: 551.7 PG/ML (ref 0–900)
PLATELET # BLD AUTO: 274 10*3/MM3 (ref 140–450)
PMV BLD AUTO: 11.2 FL (ref 6–12)
POTASSIUM SERPL-SCNC: 4 MMOL/L (ref 3.5–5.2)
PROT SERPL-MCNC: 6.6 G/DL (ref 6–8.5)
RBC # BLD AUTO: 5.05 10*6/MM3 (ref 3.77–5.28)
SODIUM SERPL-SCNC: 139 MMOL/L (ref 136–145)
T4 FREE SERPL-MCNC: 1.42 NG/DL (ref 0.93–1.7)
TRIGL SERPL-MCNC: 193 MG/DL (ref 0–150)
TSH SERPL DL<=0.05 MIU/L-ACNC: 0.78 UIU/ML (ref 0.27–4.2)
VLDLC SERPL-MCNC: 32 MG/DL (ref 5–40)
WBC # BLD AUTO: 12.65 10*3/MM3 (ref 3.4–10.8)

## 2021-08-04 PROCEDURE — 80053 COMPREHEN METABOLIC PANEL: CPT | Performed by: NURSE PRACTITIONER

## 2021-08-04 PROCEDURE — 85025 COMPLETE CBC W/AUTO DIFF WBC: CPT | Performed by: NURSE PRACTITIONER

## 2021-08-04 PROCEDURE — G0378 HOSPITAL OBSERVATION PER HR: HCPCS

## 2021-08-04 PROCEDURE — 83036 HEMOGLOBIN GLYCOSYLATED A1C: CPT | Performed by: HOSPITALIST

## 2021-08-04 PROCEDURE — 83735 ASSAY OF MAGNESIUM: CPT | Performed by: NURSE PRACTITIONER

## 2021-08-04 PROCEDURE — 83880 ASSAY OF NATRIURETIC PEPTIDE: CPT | Performed by: HOSPITALIST

## 2021-08-04 PROCEDURE — 84443 ASSAY THYROID STIM HORMONE: CPT | Performed by: HOSPITALIST

## 2021-08-04 PROCEDURE — 99225 PR SBSQ OBSERVATION CARE/DAY 25 MINUTES: CPT | Performed by: NURSE PRACTITIONER

## 2021-08-04 PROCEDURE — 84439 ASSAY OF FREE THYROXINE: CPT | Performed by: HOSPITALIST

## 2021-08-04 PROCEDURE — 96375 TX/PRO/DX INJ NEW DRUG ADDON: CPT

## 2021-08-04 PROCEDURE — 80061 LIPID PANEL: CPT | Performed by: NURSE PRACTITIONER

## 2021-08-04 PROCEDURE — 96376 TX/PRO/DX INJ SAME DRUG ADON: CPT

## 2021-08-04 PROCEDURE — 25010000002 DIGOXIN PER 500 MCG: Performed by: INTERNAL MEDICINE

## 2021-08-04 RX ORDER — DILTIAZEM HYDROCHLORIDE 60 MG/1
60 TABLET, FILM COATED ORAL EVERY 8 HOURS SCHEDULED
Status: DISCONTINUED | OUTPATIENT
Start: 2021-08-04 | End: 2021-08-05 | Stop reason: HOSPADM

## 2021-08-04 RX ORDER — DIGOXIN 0.25 MG/ML
250 INJECTION INTRAMUSCULAR; INTRAVENOUS ONCE
Status: COMPLETED | OUTPATIENT
Start: 2021-08-04 | End: 2021-08-04

## 2021-08-04 RX ADMIN — DIGOXIN 250 MCG: 250 INJECTION, SOLUTION INTRAMUSCULAR; INTRAVENOUS; PARENTERAL at 17:41

## 2021-08-04 RX ADMIN — DILTIAZEM HYDROCHLORIDE 60 MG: 60 TABLET, FILM COATED ORAL at 21:15

## 2021-08-04 RX ADMIN — DIGOXIN 250 MCG: 250 INJECTION, SOLUTION INTRAMUSCULAR; INTRAVENOUS; PARENTERAL at 13:42

## 2021-08-04 RX ADMIN — METOPROLOL TARTRATE 25 MG: 25 TABLET, FILM COATED ORAL at 14:48

## 2021-08-04 RX ADMIN — PANTOPRAZOLE SODIUM 40 MG: 40 TABLET, DELAYED RELEASE ORAL at 06:57

## 2021-08-04 RX ADMIN — ASPIRIN 81 MG: 81 TABLET, CHEWABLE ORAL at 08:16

## 2021-08-04 RX ADMIN — ATORVASTATIN CALCIUM 10 MG: 20 TABLET, FILM COATED ORAL at 21:15

## 2021-08-04 RX ADMIN — SODIUM CHLORIDE, PRESERVATIVE FREE 10 ML: 5 INJECTION INTRAVENOUS at 08:19

## 2021-08-04 RX ADMIN — CLOPIDOGREL BISULFATE 75 MG: 75 TABLET, FILM COATED ORAL at 08:16

## 2021-08-04 RX ADMIN — SODIUM CHLORIDE, PRESERVATIVE FREE 10 ML: 5 INJECTION INTRAVENOUS at 21:17

## 2021-08-04 RX ADMIN — APIXABAN 5 MG: 5 TABLET, FILM COATED ORAL at 21:15

## 2021-08-04 RX ADMIN — APIXABAN 5 MG: 5 TABLET, FILM COATED ORAL at 08:16

## 2021-08-04 RX ADMIN — DILTIAZEM HYDROCHLORIDE 60 MG: 60 TABLET, FILM COATED ORAL at 13:04

## 2021-08-04 RX ADMIN — LOSARTAN POTASSIUM: 50 TABLET, FILM COATED ORAL at 08:16

## 2021-08-04 RX ADMIN — AMLODIPINE BESYLATE 5 MG: 5 TABLET ORAL at 08:17

## 2021-08-04 NOTE — PROGRESS NOTES
[facility]  Cardiology Progress Note    Patient Identification:  Name: Mag Silva  Age: 75 y.o.  Sex: female  :  1945  MRN: [id number]                 Follow Up / Chief Complaint: Follow-up for neck pain/atrial fibrillation    Interval History: Cardizem drip was turned off this a.m. and switch to p.o.  Heart rate has increased to 120s at times.       Subjective: She states she has some palpitations and fullness in her neck at times.  Denies shortness of breath.        Objective:    Past Medical History:  Past Medical History:   Diagnosis Date   • Chest pain    • COPD (chronic obstructive pulmonary disease) (CMS/[facility])    • Coronary artery disease    • Crescendo angina (CMS/[facility])    • Dyslipidemia    • Hypertension    • PVC (premature ventricular contraction)    • Syncope      Past Surgical History:  Past Surgical History:   Procedure Laterality Date   • BACK SURGERY     • CARDIAC CATHETERIZATION     • CAROTID STENT     • HYSTERECTOMY     • NECK SURGERY          Social History:   Social History     Tobacco Use   • Smoking status: Former Smoker     Packs/day: 1.50     Years: 20.00     Pack years: 30.00     Types: Cigarettes     Quit date: 2004     Years since quittin.5   Substance Use Topics   • Alcohol use: No      Family History:  Family History   Problem Relation Age of Onset   • Stroke Mother    • Asthma Father    • Cancer Father           Allergies:  Allergies   Allergen Reactions   • Theophyllines      Scheduled Meds:  amLODIPine, 5 mg, Daily  apixaban, 5 mg, Q12H  aspirin, 81 mg, Daily  atorvastatin, 10 mg, Nightly  clopidogrel, 75 mg, Daily  dilTIAZem, 60 mg, Q8H  losartan-HCTZ 100-25 combo dose, , Daily  pantoprazole, 40 mg, Q AM  sodium chloride, 10 mL, Q12H            INTAKE AND OUTPUT:    Intake/Output Summary (Last 24 hours) at 2021 1412  Last data filed at 2021 1200  Gross per 24 hour   Intake 600 ml   Output --   Net 600 ml       Review of Systems:   GI:  Denies nausea and vomiting  Cardiac:   She states she has some palpitations and fullness in her throat when her heart rate is up.  pulmonary: Denies shortness of breath and cough    Constitutional:  Temp:  [97.5 °F (36.4 °C)-98.3 °F (36.8 °C)] 97.5 °F (36.4 °C)  Heart Rate:  [] 128  Resp:  [18] 18  BP: (121-131)/(74-85) 131/74    No change  Physical Exam:  General:  Appears in no acute distress, resting in bed  Eyes: EOM normal no conjunctival drainage  HEENT:  No JVD. Thyroid not visibly enlarged. No mucosal pallor or cyanosis  Respiratory: Respirations regular and unlabored at rest. BBS with good air anteriorly and laterally. No crackles, rubs or wheezes auscultated  Cardiovascular: S1S2 irregularly irregular rhythm. No murmur, rub or gallop. No carotid bruits. DP/PT pulses +2    . No pretibial pitting edema  Gastrointestinal: Abdomen soft, flat, non tender. Bowel sounds present. No hepatosplenomegaly. No ascites  Musculoskeletal: POOLE x4. No abnormal movements   Neuro: AAO x3 CN II-XII grossly intact  Psych: Mood and affect normal, pleasant and cooperative          Cardiographics  Telemetry:   afib      ECG:              Echocardiogram:   Interpretation Summary     · Left atrial cavity size is borderline dilated.  · Left Ventricle: Calculated EF = 84.8%.  · Trace mitral valve regurgitation is present  · There is no evidence of pericardial effusion         FINAL CONCLUSION:  1.  Normal left main.   2.  A 30% to 40% proximal LAD, 40% diagonal.   3.  A 40% OM intrastent stenosis and distal RCA 99% down to 0% with a 3.0 x 15  Xience dilated to 3.2.   4.  Normal left ventriculogram.      DISCUSSION: Results of angioplasty: Successful angioplasty and stent to the  distal RCA reduced from 99% with thrombus down to 0% with a 3.0 x 15 dilated to  3.2 Xience stent       Lab Review   Results from last 7 days   Lab Units 08/03/21  0438 08/02/21  1414   TROPONIN T ng/mL <0.010 <0.010     Results from last 7 days   Lab  "Units 08/04/21  0623   MAGNESIUM mg/dL 2.0     Results from last 7 days   Lab Units 08/04/21  0623   SODIUM mmol/L 139   POTASSIUM mmol/L 4.0   BUN mg/dL 21   CREATININE mg/dL 0.65   CALCIUM mg/dL 9.5        Results from last 7 days   Lab Units 08/04/21  0623 08/03/21  0438 08/02/21  2035 08/02/21  1414 08/02/21  1414   WBC 10*3/mm3 12.65* 11.63*  --   --  12.56*   HEMOGLOBIN g/dL 14.2 13.8  --   --  15.4   HEMATOCRIT % 43.6 42.2  --   --  48.8*   PLATELETS 10*3/mm3 274 261 287   < > 312    < > = values in this interval not displayed.     Results from last 7 days   Lab Units 08/02/21  1414   INR  0.95         Assessment:  1.  Atrial fibrillation with RVR: Cardizem drip turned off and p.o. started this a.m.  Added beta-blocker and started on Eliquis.  2.  CAD: Previous ischemic work-up as above.  3.  Hypertension: Stable, monitor  4.  Hyperlipidemia: Lipid panel with  and LDL 55.  Triglycerides slightly elevated at 193.  AST/ALT WNL.    5.  COPD  6.  Obesity      Plan:  Blood pressure low with heart rate fluctuating to 120s.  We will add beta-blocker and discontinue Norvasc.  Electrolytes within normal limits.  TSH WNL.  Atrial fibrillation on the monitor.        Labs/tests ordered for am: Discontinue amlodipine, 2 doses of digoxin given, add BB, CMP in a.m., and medication adjustments    )8/4/2021  JENNIFER Bland/Transcription:   \"Dictated utilizing Dragon dictation\".     "

## 2021-08-04 NOTE — PLAN OF CARE
Goal Outcome Evaluation:  Plan of Care Reviewed With: patient           Outcome Summary: VSS. No complaints of painl, no signs of distress. Overall, an uneventful shift. Possible DC today. WCM.

## 2021-08-04 NOTE — PLAN OF CARE
Goal Outcome Evaluation:     74 yo female admitted 8/2 with new onset a-fib with RVR. Cardizem drip dc'd this morning. Pt heart rate spiked into 140s-150s while eating lunch. Two doses IV digoxin administered, oral metoprolol given with HR recovering into 110s. Blood pressure stable, room air, A&Ox4.

## 2021-08-04 NOTE — PROGRESS NOTES
"Daily progress note    Chief complaint  Not doing too well  Complaint of generalized weakness  Denies any chest pain shortness of breath or palpitation  Saying that she does not feel good    History of present illness  75-year-old female with COPD coronary artery disease hypertension hyperlipidemia and gastroesophageal disease presented to [facility] emergency room with throat fullness.  Patient has off-and-on palpitation but no chest pain or increased shortness of breath.  Patient also denies any fever chills cough congestion night sweats weight loss or weight gain.  Patient work-up in ER revealed new onset atrial fibrillation admitted to cardiology service and I am asked to follow the patient for medical problem.  At the time of interview she has no complaints except being in the hospital and wants to go home.     REVIEW OF SYSTEMS  Constitutional: Positive for no energy and fatigue  HENT:  Fullness in throat  Eyes: Negative.    Respiratory: Negative.  Negative for cough.    Cardiovascular: Negative for chest pain.   Gastrointestinal: Negative.    Genitourinary: Negative.  Negative for dysuria.   Musculoskeletal: Negative for back pain  Skin: Negative.  Negative for rash.   Neurological: Negative.  Negative for headaches.   All other systems reviewed and are negative.     PHYSICAL EXAM   Blood pressure 128/77, pulse 69, temperature 97.5 °F (36.4 °C), temperature source Oral, resp. rate 18, height 162.6 cm (64\"), weight 95.7 kg (211 lb), SpO2 95 %.    GENERAL: Alert female in no obvious distress  HENT: nares patent  EYES: no scleral icterus  CV: Irregular irregular and tachycardic  RESPIRATORY: normal effort, clear to auscultation bilaterally  ABDOMEN: soft, nontender bowel sounds positive  MUSCULOSKELETAL: no deformity, mild bilateral swelling, no significant or is palpation  NEURO: Strength, sensation, and coordination are grossly intact.  Speech and mentation are unremarkable  SKIN: warm, dry-no " unusual rashes are noted     LAB RESULTS  Lab Results (last 24 hours)     Procedure Component Value Units Date/Time    TSH [[id number]]  (Normal) Collected: 08/04/21 0623    Specimen: Blood Updated: 08/04/21 0738     TSH 0.776 uIU/mL     BNP [[id number]]  (Normal) Collected: 08/04/21 0623    Specimen: Blood Updated: 08/04/21 0738     proBNP 551.7 pg/mL     Narrative:      Among patients with dyspnea, NT-proBNP is highly sensitive for the detection of acute congestive heart failure. In addition NT-proBNP of <300 pg/ml effectively rules out acute congestive heart failure with 99% negative predictive value.    Results may be falsely decreased if patient taking Biotin.      Comprehensive Metabolic Panel [[id number]]  (Abnormal) Collected: 08/04/21 0623    Specimen: Blood Updated: 08/04/21 0732     Glucose 124 mg/dL      BUN 21 mg/dL      Creatinine 0.65 mg/dL      Sodium 139 mmol/L      Potassium 4.0 mmol/L      Chloride 105 mmol/L      CO2 21.9 mmol/L      Calcium 9.5 mg/dL      Total Protein 6.6 g/dL      Albumin 4.00 g/dL      ALT (SGPT) 16 U/L      AST (SGOT) 14 U/L      Alkaline Phosphatase 69 U/L      Total Bilirubin 0.5 mg/dL      eGFR Non African Amer 89 mL/min/1.73      Globulin 2.6 gm/dL      A/G Ratio 1.5 g/dL      BUN/Creatinine Ratio 32.3     Anion Gap 12.1 mmol/L     Narrative:      GFR Normal >60  Chronic Kidney Disease <60  Kidney Failure <15      Lipid Panel [[id number]]  (Abnormal) Collected: 08/04/21 0623    Specimen: Blood Updated: 08/04/21 0732     Total Cholesterol 124 mg/dL      Triglycerides 193 mg/dL      HDL Cholesterol 37 mg/dL      LDL Cholesterol  55 mg/dL      VLDL Cholesterol 32 mg/dL      LDL/HDL Ratio 1.31    Narrative:      Cholesterol Reference Ranges  (U.S. Department of Health and Human Services ATP III Classifications)    Desirable          <200 mg/dL  Borderline High    200-239 mg/dL  High Risk          >240 mg/dL      Triglyceride Reference Ranges  (U.S. Department of Health and Human  Services ATP III Classifications)    Normal           <150 mg/dL  Borderline High  150-199 mg/dL  High             200-499 mg/dL  Very High        >500 mg/dL    HDL Reference Ranges  (U.S. Department of Health and Human Services ATP III Classifcations)    Low     <40 mg/dl (major risk factor for CHD)  High    >60 mg/dl ('negative' risk factor for CHD)        LDL Reference Ranges  (U.S. Department of Health and Human Services ATP III Classifcations)    Optimal          <100 mg/dL  Near Optimal     100-129 mg/dL  Borderline High  130-159 mg/dL  High             160-189 mg/dL  Very High        >189 mg/dL    Magnesium [[id number]]  (Normal) Collected: 08/04/21 0623    Specimen: Blood Updated: 08/04/21 0732     Magnesium 2.0 mg/dL     Hemoglobin A1c [[id number]]  (Abnormal) Collected: 08/04/21 0623    Specimen: Blood Updated: 08/04/21 0706     Hemoglobin A1C 6.21 %     Narrative:      Hemoglobin A1C Ranges:    Increased Risk for Diabetes  5.7% to 6.4%  Diabetes                     >= 6.5%  Diabetic Goal                < 7.0%    CBC & Differential [[id number]]  (Abnormal) Collected: 08/04/21 0623    Specimen: Blood Updated: 08/04/21 0701    Narrative:      The following orders were created for panel order CBC & Differential.  Procedure                               Abnormality         Status                     ---------                               -----------         ------                     CBC Auto Differential[[id number]]        Abnormal            Final result                 Please view results for these tests on the individual orders.    CBC Auto Differential [[id number]]  (Abnormal) Collected: 08/04/21 0623    Specimen: Blood Updated: 08/04/21 0701     WBC 12.65 10*3/mm3      RBC 5.05 10*6/mm3      Hemoglobin 14.2 g/dL      Hematocrit 43.6 %      MCV 86.3 fL      MCH 28.1 pg      MCHC 32.6 g/dL      RDW 14.1 %      RDW-SD 44.2 fl      MPV 11.2 fL      Platelets 274 10*3/mm3      Neutrophil % 72.1 %      Lymphocyte  % 17.7 %      Monocyte % 8.5 %      Eosinophil % 0.9 %      Basophil % 0.4 %      Immature Grans % 0.4 %      Neutrophils, Absolute 9.11 10*3/mm3      Lymphocytes, Absolute 2.24 10*3/mm3      Monocytes, Absolute 1.08 10*3/mm3      Eosinophils, Absolute 0.12 10*3/mm3      Basophils, Absolute 0.05 10*3/mm3      Immature Grans, Absolute 0.05 10*3/mm3      nRBC 0.0 /100 WBC               Study Result       XR CHEST 1 VW-     Clinical: Arrhythmia     COMPARISON 8/24/2017     FINDINGS: Atherosclerotic calcification of the aorta, calcified benign  granuloma left lung base, cardiac size within normal limits. No  effusion, edema or acute airspace disease has developed.     CONCLUSION: No acute cardiovascular or pulmonary process is  demonstrated.        ECG 12 Lead  Component   Ref Range & Units 8/3/21 0733 8/2/21 1347   QT Interval   ms 425 P  300         HEART RATE= 70  bpm  RR Interval= 857  ms  NV Interval=   ms  P Horizontal Axis=   deg  P Front Axis=   deg  QRSD Interval= 78  ms  QT Interval= 425  ms  QRS Axis= 9  deg  T Wave Axis= -67  deg  - ABNORMAL ECG -  Atrial fibrillation  Inferior infarct, age indeterminate             Current Facility-Administered Medications:   •  amLODIPine (NORVASC) tablet 5 mg, 5 mg, Oral, Daily, Bri Hall APRN, 5 mg at 08/04/21 0817  •  apixaban (ELIQUIS) tablet 5 mg, 5 mg, Oral, Q12H, Bri Hall APRN, 5 mg at 08/04/21 0816  •  aspirin chewable tablet 81 mg, 81 mg, Oral, Daily, Perry Frances MD, 81 mg at 08/04/21 0816  •  atorvastatin (LIPITOR) tablet 10 mg, 10 mg, Oral, Nightly, Perry Frances MD, 10 mg at 08/03/21 2109  •  clopidogrel (PLAVIX) tablet 75 mg, 75 mg, Oral, Daily, Bri Hall APRN, 75 mg at 08/04/21 0816  •  dilTIAZem (CARDIZEM) tablet 60 mg, 60 mg, Oral, Q8H, Bri Hall APRN  •  losartan (COZAAR) 100 mg, hydroCHLOROthiazide (HYDRODIURIL) 25 mg, , Oral, Daily, Bri Hall APRN, Given at 08/04/21 0816  •  melatonin tablet 5 mg,  5 mg, Oral, Nightly PRN, Bri Vargas APRN, 5 mg at 08/02/21 2120  •  pantoprazole (PROTONIX) EC tablet 40 mg, 40 mg, Oral, Q AM, Nadir Frances MD, 40 mg at 08/04/21 0657  •  Pharmacy to Dose enoxaparin (LOVENOX), , Does not apply, Continuous PRN, Bri Hall APRN  •  sodium chloride 0.9 % flush 10 mL, 10 mL, Intravenous, PRN, Jhony Nunn MD  •  sodium chloride 0.9 % flush 10 mL, 10 mL, Intravenous, Q12H, Bri Hall APRN, 10 mL at 08/04/21 0819  •  sodium chloride 0.9 % flush 10 mL, 10 mL, Intravenous, PRN, Bri Hall APRN     ASSESSMENT  New onset atrial fibrillation with rapid ventricular rate  Coronary artery disease  COPD  Hypertension  Hyperlipidemia  Gastroesophageal reflux disease    PLAN  CPM  Control the heart rate  Anticoagulation  Continue home medications  Stress ulcer DVT prophylaxis  Supportive care  PT/OT  Discussed with nursing staff  We will follow with Dr. KENNEY and further recommendation current hospital course    NADIR FRANCES MD

## 2021-08-05 VITALS
WEIGHT: 211 LBS | HEIGHT: 64 IN | SYSTOLIC BLOOD PRESSURE: 126 MMHG | BODY MASS INDEX: 36.02 KG/M2 | TEMPERATURE: 97.9 F | HEART RATE: 71 BPM | DIASTOLIC BLOOD PRESSURE: 79 MMHG | RESPIRATION RATE: 18 BRPM | OXYGEN SATURATION: 95 %

## 2021-08-05 DIAGNOSIS — I25.10 CHRONIC CORONARY ARTERY DISEASE: ICD-10-CM

## 2021-08-05 DIAGNOSIS — I25.10 CORONARY ARTERIOSCLEROSIS IN NATIVE ARTERY: ICD-10-CM

## 2021-08-05 DIAGNOSIS — I48.0 PAROXYSMAL ATRIAL FIBRILLATION (HCC): Primary | ICD-10-CM

## 2021-08-05 DIAGNOSIS — I10 BENIGN ESSENTIAL HYPERTENSION: ICD-10-CM

## 2021-08-05 LAB
ANION GAP SERPL CALCULATED.3IONS-SCNC: 12.5 MMOL/L (ref 5–15)
BASOPHILS # BLD AUTO: 0.04 10*3/MM3 (ref 0–0.2)
BASOPHILS NFR BLD AUTO: 0.4 % (ref 0–1.5)
BUN SERPL-MCNC: 22 MG/DL (ref 8–23)
BUN/CREAT SERPL: 30.6 (ref 7–25)
CALCIUM SPEC-SCNC: 9.2 MG/DL (ref 8.6–10.5)
CHLORIDE SERPL-SCNC: 102 MMOL/L (ref 98–107)
CO2 SERPL-SCNC: 21.5 MMOL/L (ref 22–29)
CREAT SERPL-MCNC: 0.72 MG/DL (ref 0.57–1)
DEPRECATED RDW RBC AUTO: 42.8 FL (ref 37–54)
EOSINOPHIL # BLD AUTO: 0.09 10*3/MM3 (ref 0–0.4)
EOSINOPHIL NFR BLD AUTO: 0.8 % (ref 0.3–6.2)
ERYTHROCYTE [DISTWIDTH] IN BLOOD BY AUTOMATED COUNT: 13.8 % (ref 12.3–15.4)
GFR SERPL CREATININE-BSD FRML MDRD: 79 ML/MIN/1.73
GLUCOSE SERPL-MCNC: 130 MG/DL (ref 65–99)
HCT VFR BLD AUTO: 42.6 % (ref 34–46.6)
HGB BLD-MCNC: 14 G/DL (ref 12–15.9)
IMM GRANULOCYTES # BLD AUTO: 0.03 10*3/MM3 (ref 0–0.05)
IMM GRANULOCYTES NFR BLD AUTO: 0.3 % (ref 0–0.5)
LYMPHOCYTES # BLD AUTO: 2.24 10*3/MM3 (ref 0.7–3.1)
LYMPHOCYTES NFR BLD AUTO: 19.6 % (ref 19.6–45.3)
MAGNESIUM SERPL-MCNC: 2.2 MG/DL (ref 1.6–2.4)
MCH RBC QN AUTO: 28.2 PG (ref 26.6–33)
MCHC RBC AUTO-ENTMCNC: 32.9 G/DL (ref 31.5–35.7)
MCV RBC AUTO: 85.7 FL (ref 79–97)
MONOCYTES # BLD AUTO: 1.05 10*3/MM3 (ref 0.1–0.9)
MONOCYTES NFR BLD AUTO: 9.2 % (ref 5–12)
NEUTROPHILS NFR BLD AUTO: 69.7 % (ref 42.7–76)
NEUTROPHILS NFR BLD AUTO: 7.97 10*3/MM3 (ref 1.7–7)
NRBC BLD AUTO-RTO: 0 /100 WBC (ref 0–0.2)
PLATELET # BLD AUTO: 293 10*3/MM3 (ref 140–450)
PMV BLD AUTO: 11.2 FL (ref 6–12)
POTASSIUM SERPL-SCNC: 4.2 MMOL/L (ref 3.5–5.2)
RBC # BLD AUTO: 4.97 10*6/MM3 (ref 3.77–5.28)
SODIUM SERPL-SCNC: 136 MMOL/L (ref 136–145)
WBC # BLD AUTO: 11.42 10*3/MM3 (ref 3.4–10.8)

## 2021-08-05 PROCEDURE — 80048 BASIC METABOLIC PNL TOTAL CA: CPT | Performed by: HOSPITALIST

## 2021-08-05 PROCEDURE — 99217 PR OBSERVATION CARE DISCHARGE MANAGEMENT: CPT | Performed by: NURSE PRACTITIONER

## 2021-08-05 PROCEDURE — G0378 HOSPITAL OBSERVATION PER HR: HCPCS

## 2021-08-05 PROCEDURE — 85025 COMPLETE CBC W/AUTO DIFF WBC: CPT | Performed by: HOSPITALIST

## 2021-08-05 PROCEDURE — 83735 ASSAY OF MAGNESIUM: CPT | Performed by: NURSE PRACTITIONER

## 2021-08-05 RX ORDER — DILTIAZEM HYDROCHLORIDE 60 MG/1
60 TABLET, FILM COATED ORAL EVERY 8 HOURS SCHEDULED
Qty: 90 TABLET | Refills: 12 | Status: SHIPPED | OUTPATIENT
Start: 2021-08-05 | End: 2021-08-25 | Stop reason: SDUPTHER

## 2021-08-05 RX ADMIN — APIXABAN 5 MG: 5 TABLET, FILM COATED ORAL at 08:47

## 2021-08-05 RX ADMIN — LOSARTAN POTASSIUM: 50 TABLET, FILM COATED ORAL at 08:47

## 2021-08-05 RX ADMIN — SODIUM CHLORIDE, PRESERVATIVE FREE 10 ML: 5 INJECTION INTRAVENOUS at 08:49

## 2021-08-05 RX ADMIN — DILTIAZEM HYDROCHLORIDE 60 MG: 60 TABLET, FILM COATED ORAL at 06:36

## 2021-08-05 RX ADMIN — METOPROLOL TARTRATE 25 MG: 25 TABLET, FILM COATED ORAL at 08:47

## 2021-08-05 RX ADMIN — CLOPIDOGREL BISULFATE 75 MG: 75 TABLET, FILM COATED ORAL at 08:47

## 2021-08-05 RX ADMIN — ASPIRIN 81 MG: 81 TABLET, CHEWABLE ORAL at 08:47

## 2021-08-05 RX ADMIN — PANTOPRAZOLE SODIUM 40 MG: 40 TABLET, DELAYED RELEASE ORAL at 06:33

## 2021-08-05 NOTE — DISCHARGE SUMMARY
[facility]  Physician Discharge Summary    Patient Identification:  Name: Mag Silva  Age: 75 y.o.  Sex: female  :  1945  MRN: [id number]    Admit date: 2021    Discharge date and time: 21 at 1101      Admitting Physician: Tony Ayala MD     Discharge Physician: JENNIFER Bland    Discharge Diagnoses:   Neck pain  CAD  Dyslipidemia   hypertension  COPD  Obesity      Patient Active Problem List   Diagnosis   • Coronary arteriosclerosis in native artery   • Benign essential hypertension   • Chronic coronary artery disease   • Chest pain   • Chronic obstructive pulmonary disease (CMS/HCC)   • Dyslipidemia   • Ventricular premature beats   • Syncope   • Hyperlipidemia   • Overweight   • New onset atrial fibrillation (CMS/HCC)       Discharged Condition: stable    Hospital Course:     This is a 75-year-old female, who is well-known to our service.  She has a history of COPD, CAD, dyslipidemia as well as COPD.  She presented to [facility] with throat fullness and it was improved with nitroglycerin.  She was noted to be in atrial fibrillation with RVR.  The time she was placed on a Cardizem drip.  Currently she is rate controlled in atrial fibrillation on the monitor.  She is stable.  Blood pressure and heart rate are controlled.  [place] chest x-ray showed no acute cardiovascular pulmonary process noted.  Troponins were negative x2, creatinine stable, ALT/AST WNL.     Previous ischemic work-up with stent placement in .  She will followed up in the office to discuss cardioversion.  Risk and benefits have been discussed.  She will be followed up as outpatient for a stress test and echo as she does not want to complete here.  Risk and benefits have been discussed.  Discharge instructions as below.  She was asked not to miss any doses of her anticoagulation due to possible cardioversion in 1 month.  Risk and benefits have been discussed.  She  verbalized understanding.  Cardiovascular stable for discharge.      Consults:   IP CONSULT TO CARDIOLOGY  IP CONSULT TO INTERNAL MEDICINE    Discharge Exam:  General: No acute distress, resting in bed   Skin: Warm and dry, no diaphoresis noted   EYES:  EOM normal no conjunctival drainage   HEENT: external ear and nose normal; oral mucosa moist   Neck: Supple; no carotid bruits; no JVD   Heart: S1S2 irregularly rhythm; no murmurs; no gallop or rub appreciated   Pulmonary: Respirations regular, unlabored at rest, bilateral breath sounds have good air entry throughout all lung fields; no crackles, rubs or wheezes auscultated.     GI: Soft, non-tender, non-distended, positive bowel sounds  No hepatosplenomegaly   Extremities: Bilateral lower extremities have no pre-tibial pitting edema; DP/PT pulses are palpable   Neurological: Alert and oriented x 3; no neuro deficits          LABS:  Results from last 7 days   Lab Units 08/03/21  0438 08/02/21  1414   TROPONIN T ng/mL <0.010 <0.010     Results from last 7 days   Lab Units 08/05/21  0333   MAGNESIUM mg/dL 2.2     Results from last 7 days   Lab Units 08/05/21  0333   SODIUM mmol/L 136   POTASSIUM mmol/L 4.2   BUN mg/dL 22   CREATININE mg/dL 0.72   CALCIUM mg/dL 9.2        Results from last 7 days   Lab Units 08/05/21  0333 08/04/21  0623 08/03/21  0438   WBC 10*3/mm3 11.42* 12.65* 11.63*   HEMOGLOBIN g/dL 14.0 14.2 13.8   HEMATOCRIT % 42.6 43.6 42.2   PLATELETS 10*3/mm3 293 274 261     Results from last 7 days   Lab Units 08/02/21  1414   INR  0.95     Results from last 7 days   Lab Units 08/04/21  0623   CHOLESTEROL mg/dL 124   TRIGLYCERIDES mg/dL 193*   HDL CHOL mg/dL 37*   LDL CHOL mg/dL 55     Disposition:  Home    Discharge Medications:      Discharge Medications      ASK your doctor about these medications      Instructions Start Date   amLODIPine 5 MG tablet  Commonly known as: NORVASC   5 mg, Oral, Daily      aspirin 81 MG chewable tablet   81 mg, Oral,  Nightly      atorvastatin 10 MG tablet  Commonly known as: LIPITOR   10 mg, Oral, Daily      clopidogrel 75 MG tablet  Commonly known as: PLAVIX   TAKE 1 TABLET EVERY DAY      famotidine 20 MG tablet  Commonly known as: PEPCID   20 mg, Oral, 2 Times Daily PRN      fish oil 1000 MG capsule capsule   Oral, Daily With Breakfast      MULTI COMPLETE PO   Oral      nitroglycerin 0.4 MG SL tablet  Commonly known as: NITROSTAT   1 under the tongue as needed for angina, may repeat q5mins for up three doses      olmesartan-hydrochlorothiazide 40-25 MG per tablet  Commonly known as: Benicar HCT   1 tablet, Oral, Daily      Stiolto Respimat 2.5-2.5 MCG/ACT aerosol solution inhaler  Generic drug: tiotropium bromide-olodaterol   Inhalation, Daily - RT      TYLENOL 8 HOUR ARTHRITIS PAIN PO   Oral      Vitamin D3 50 MCG (2000 UT) capsule   2,000 Units, Oral, Nightly      zolpidem 10 MG tablet  Commonly known as: AMBIEN   10 mg, Oral, Nightly PRN               Discharge Home Instructions:   1. Follow up with Dr. Ayala on September 7 at 2:15 PM.  The office will call with appointment for stress testing and echo.  2.  Follow-up with your primary care physician in 1 week.  Please call for an appointment.  3. Return to ER per EMS for any recurrent symptoms including, but not limited to: chest pain/pressure/tightness, weakness, Shortness of breath, dizziness, palpitations, near syncope or syncope  4.  Please take all medications as advised.  Please do not miss any doses of your Eliquis.  If there are missed doses please advise the office.  5.  Monitor for risk of  the bleedings.  Bleeding that will not stop, nosebleeds, black tarry stools, etc.  6.  Please monitor your blood pressure and heart rate.  Please take these readings 1 hour and a half after taking your blood pressure medications. You will need to write them down in a daily diary and bring them to the office.  If your systolic is less than 100 (example 120/80) or your  "heart rate is less than 60 please advised the office.    Signed:  JENNIFER Bland  8/5/2021  10:32 EDT      EMR Dragon/Transcription:   \"Dictated utilizing Dragon dictation\".     "

## 2021-08-05 NOTE — CASE MANAGEMENT/SOCIAL WORK
Case Management Discharge Note      Final Note: Pt was dc'd home    Provided Post Acute Provider List?: N/A  Provided Post Acute Provider Quality & Resource List?: N/A    Selected Continued Care - Discharged on 8/5/2021 Admission date: 8/2/2021 - Discharge disposition: Home or Self Care    Destination    No services have been selected for the patient.              Durable Medical Equipment    No services have been selected for the patient.              Dialysis/Infusion    No services have been selected for the patient.              Home Medical Care    No services have been selected for the patient.              Therapy    No services have been selected for the patient.              Community Resources    No services have been selected for the patient.              Community & DME    No services have been selected for the patient.                  Transportation Services  Private: Car    Final Discharge Disposition Code: 01 - home or self-care

## 2021-08-05 NOTE — PROGRESS NOTES
"Daily progress note    Chief complaint  Doing much better this morning  No new complaints  Denies any chest pain shortness of breath palpitation  Wants to go home    History of present illness  75-year-old female with COPD coronary artery disease hypertension hyperlipidemia and gastroesophageal disease presented to [facility] emergency room with throat fullness.  Patient has off-and-on palpitation but no chest pain or increased shortness of breath.  Patient also denies any fever chills cough congestion night sweats weight loss or weight gain.  Patient work-up in ER revealed new onset atrial fibrillation admitted to cardiology service and I am asked to follow the patient for medical problem.  At the time of interview she has no complaints except being in the hospital and wants to go home.     REVIEW OF SYSTEMS  Unremarkable     PHYSICAL EXAM   Blood pressure 126/79, pulse 71, temperature 97.9 °F (36.6 °C), temperature source Oral, resp. rate 18, height 162.6 cm (64\"), weight 95.7 kg (211 lb), SpO2 95 %.    GENERAL: Alert female in no obvious distress  HENT: nares patent  EYES: no scleral icterus  CV: Irregular irregular and tachycardic  RESPIRATORY: normal effort, clear to auscultation bilaterally  ABDOMEN: soft, nontender bowel sounds positive  MUSCULOSKELETAL: no deformity, mild bilateral swelling, no significant or is palpation  NEURO: Strength, sensation, and coordination are grossly intact.  Speech and mentation are unremarkable  SKIN: warm, dry-no unusual rashes are noted     LAB RESULTS  Lab Results (last 24 hours)     Procedure Component Value Units Date/Time    Magnesium [[id number]]  (Normal) Collected: 08/05/21 0333    Specimen: Blood Updated: 08/05/21 0435     Magnesium 2.2 mg/dL     Basic Metabolic Panel [[id number]]  (Abnormal) Collected: 08/05/21 0333    Specimen: Blood Updated: 08/05/21 0435     Glucose 130 mg/dL      BUN 22 mg/dL      Creatinine 0.72 mg/dL      Sodium 136 mmol/L      " Potassium 4.2 mmol/L      Chloride 102 mmol/L      CO2 21.5 mmol/L      Calcium 9.2 mg/dL      eGFR Non African Amer 79 mL/min/1.73      BUN/Creatinine Ratio 30.6     Anion Gap 12.5 mmol/L     Narrative:      GFR Normal >60  Chronic Kidney Disease <60  Kidney Failure <15      CBC & Differential [[id number]]  (Abnormal) Collected: 08/05/21 0333    Specimen: Blood Updated: 08/05/21 0415    Narrative:      The following orders were created for panel order CBC & Differential.  Procedure                               Abnormality         Status                     ---------                               -----------         ------                     CBC Auto Differential[[id number]]        Abnormal            Final result                 Please view results for these tests on the individual orders.    CBC Auto Differential [[id number]]  (Abnormal) Collected: 08/05/21 0333    Specimen: Blood Updated: 08/05/21 0415     WBC 11.42 10*3/mm3      RBC 4.97 10*6/mm3      Hemoglobin 14.0 g/dL      Hematocrit 42.6 %      MCV 85.7 fL      MCH 28.2 pg      MCHC 32.9 g/dL      RDW 13.8 %      RDW-SD 42.8 fl      MPV 11.2 fL      Platelets 293 10*3/mm3      Neutrophil % 69.7 %      Lymphocyte % 19.6 %      Monocyte % 9.2 %      Eosinophil % 0.8 %      Basophil % 0.4 %      Immature Grans % 0.3 %      Neutrophils, Absolute 7.97 10*3/mm3      Lymphocytes, Absolute 2.24 10*3/mm3      Monocytes, Absolute 1.05 10*3/mm3      Eosinophils, Absolute 0.09 10*3/mm3      Basophils, Absolute 0.04 10*3/mm3      Immature Grans, Absolute 0.03 10*3/mm3      nRBC 0.0 /100 WBC     T4, Free [[id number]]  (Normal) Collected: 08/04/21 0623    Specimen: Blood Updated: 08/04/21 1752     Free T4 1.42 ng/dL     Narrative:      Results may be falsely increased if patient taking Biotin.                Study Result       XR CHEST 1 VW-     Clinical: Arrhythmia     COMPARISON 8/24/2017     FINDINGS: Atherosclerotic calcification of the aorta, calcified  benign  granuloma left lung base, cardiac size within normal limits. No  effusion, edema or acute airspace disease has developed.     CONCLUSION: No acute cardiovascular or pulmonary process is  demonstrated.        ECG 12 Lead  Component   Ref Range & Units 8/3/21 0733 8/2/21 1347   QT Interval   ms 425 P  300         HEART RATE= 70  bpm  RR Interval= 857  ms  FL Interval=   ms  P Horizontal Axis=   deg  P Front Axis=   deg  QRSD Interval= 78  ms  QT Interval= 425  ms  QRS Axis= 9  deg  T Wave Axis= -67  deg  - ABNORMAL ECG -  Atrial fibrillation  Inferior infarct, age indeterminate             Current Facility-Administered Medications:   •  apixaban (ELIQUIS) tablet 5 mg, 5 mg, Oral, Q12H, Bri Hall APRN, 5 mg at 08/05/21 0847  •  aspirin chewable tablet 81 mg, 81 mg, Oral, Daily, Perry Frances MD, 81 mg at 08/05/21 0847  •  atorvastatin (LIPITOR) tablet 10 mg, 10 mg, Oral, Nightly, Perry Frances MD, 10 mg at 08/04/21 2115  •  clopidogrel (PLAVIX) tablet 75 mg, 75 mg, Oral, Daily, Bri Hall APRN, 75 mg at 08/05/21 0847  •  dilTIAZem (CARDIZEM) tablet 60 mg, 60 mg, Oral, Q8H, Bri Hall APRN, 60 mg at 08/05/21 0636  •  losartan (COZAAR) 100 mg, hydroCHLOROthiazide (HYDRODIURIL) 25 mg, , Oral, Daily, Bri Hall APRN, Given at 08/05/21 0847  •  melatonin tablet 5 mg, 5 mg, Oral, Nightly PRN, Bri Vargas APRN, 5 mg at 08/02/21 2120  •  metoprolol tartrate (LOPRESSOR) tablet 25 mg, 25 mg, Oral, Q12H, Tony Ayala MD, 25 mg at 08/05/21 0847  •  pantoprazole (PROTONIX) EC tablet 40 mg, 40 mg, Oral, Q AM, Perry Frances MD, 40 mg at 08/05/21 0633  •  sodium chloride 0.9 % flush 10 mL, 10 mL, Intravenous, PRN, Jhony Nunn MD  •  sodium chloride 0.9 % flush 10 mL, 10 mL, Intravenous, Q12H, Bri Hall APRN, 10 mL at 08/05/21 0849  •  sodium chloride 0.9 % flush 10 mL, 10 mL, Intravenous, PRN, Bri Hall APRN    Current Outpatient  Medications:   •  Acetaminophen (TYLENOL 8 HOUR ARTHRITIS PAIN PO), Take  by mouth., Disp: , Rfl:   •  aspirin 81 MG chewable tablet, Chew 81 mg Every Night., Disp: , Rfl:   •  atorvastatin (LIPITOR) 10 MG tablet, Take 10 mg by mouth Daily., Disp: , Rfl:   •  Cholecalciferol (VITAMIN D3) 2000 UNITS capsule, Take 2,000 Units by mouth Every Night., Disp: , Rfl:   •  clopidogrel (PLAVIX) 75 MG tablet, TAKE 1 TABLET EVERY DAY, Disp: 90 tablet, Rfl: 3  •  famotidine (PEPCID) 20 MG tablet, Take 20 mg by mouth 2 (Two) Times a Day As Needed., Disp: , Rfl:   •  Multiple Vitamins-Minerals (MULTI COMPLETE PO), Take  by mouth., Disp: , Rfl:   •  nitroglycerin (NITROSTAT) 0.4 MG SL tablet, 1 under the tongue as needed for angina, may repeat q5mins for up three doses, Disp: 25 tablet, Rfl: 3  •  olmesartan-hydrochlorothiazide (Benicar HCT) 40-25 MG per tablet, Take 1 tablet by mouth Daily., Disp: 90 tablet, Rfl: 3  •  tiotropium bromide-olodaterol (STIOLTO RESPIMAT) 2.5-2.5 MCG/ACT aerosol solution inhaler, Inhale Daily., Disp: , Rfl:   •  zolpidem (AMBIEN) 10 MG tablet, Take 10 mg by mouth At Night As Needed for sleep., Disp: , Rfl:   •  apixaban (ELIQUIS) 5 MG tablet tablet, Take 1 tablet by mouth Every 12 (Twelve) Hours. Indications: Atrial Fibrillation, Atrial Fibrillation, Disp: 60 tablet, Rfl: 12  •  dilTIAZem (CARDIZEM) 60 MG tablet, Take 1 tablet by mouth Every 8 (Eight) Hours., Disp: 90 tablet, Rfl: 12  •  metoprolol tartrate (LOPRESSOR) 25 MG tablet, Take 1 tablet by mouth Every 12 (Twelve) Hours., Disp: 60 tablet, Rfl: 12  •  Omega-3 Fatty Acids (FISH OIL) 1000 MG capsule capsule, Take  by mouth Daily With Breakfast., Disp: , Rfl:      ASSESSMENT  New onset atrial fibrillation with rapid ventricular rate  Coronary artery disease  COPD  Hypertension  Hyperlipidemia  Gastroesophageal reflux disease    PLAN  CPM  Control the heart rate  Anticoagulation  Continue home medications  Stress ulcer DVT prophylaxis  Supportive  care  PT/OT  Medically stable  Discussed with nursing staff  Okay to discharge on current medications with close follow-up with primary care doctor and cardiology service    NADIR DUBOSE MD

## 2021-08-05 NOTE — PLAN OF CARE
Goal Outcome Evaluation:  Plan of Care Reviewed With: patient           Outcome Summary: VSS. No complaints of pain, no signs of distress.  Heart rate and BP stayed WDL. Overall, an uneventful shift. Possible DC today. WCM

## 2021-08-06 ENCOUNTER — READMISSION MANAGEMENT (OUTPATIENT)
Dept: CALL CENTER | Facility: HOSPITAL | Age: 76
End: 2021-08-06

## 2021-08-06 NOTE — OUTREACH NOTE
Prep Survey      Responses   [facility] facility patient discharged from?  [place]   Is LACE score < 7 ?  No   Emergency Room discharge w/ pulse ox?  No   Eligibility  Readm Mgmt   Discharge diagnosis  Coronary arteriosclerosis in native artery   Does the patient have one of the following disease processes/diagnoses(primary or secondary)?  Other   Does the patient have Home health ordered?  No   Is there a DME ordered?  No   Prep survey completed?  Yes          Martha Cook RN

## 2021-08-09 LAB — QT INTERVAL: 425 MS

## 2021-08-10 ENCOUNTER — READMISSION MANAGEMENT (OUTPATIENT)
Dept: CALL CENTER | Facility: HOSPITAL | Age: 76
End: 2021-08-10

## 2021-08-10 NOTE — OUTREACH NOTE
Medical Week 1 Survey      Responses   [facility] patient discharged from?  [place]   Does the patient have one of the following disease processes/diagnoses(primary or secondary)?  Other   Week 1 attempt successful?  Yes   Call start time  1739   Call end time  1746   Discharge diagnosis  New onset afib   Is patient permission given to speak with other caregiver?  No   Meds reviewed with patient/caregiver?  Yes   Does the patient have all medications ordered at discharge?  Yes   Is the patient taking all medications as directed (includes completed medication regime)?  Yes   Comments regarding appointments  Cardiology f/u appt 9/7/21   Does the patient have a primary care provider?   Yes   Does the patient have an appointment with their PCP within 7 days of discharge?  No   Comments regarding PCP  PCP Dr Caceres. Patient has not called for PCP appt yet.    Nursing Interventions  Educated patient on importance of making appointment, Advised patient to make appointment   Has the patient kept scheduled appointments due by today?  N/A   Has home health visited the patient within 72 hours of discharge?  N/A   Psychosocial issues?  No   Comments  patient monitoring home b/p and HR and keeping log of readings.    Did the patient receive a copy of their discharge instructions?  Yes   Nursing interventions  Reviewed instructions with patient   What is the patient's perception of their health status since discharge?  Improving   Is the patient/caregiver able to teach back signs and symptoms related to disease process for when to call PCP?  Yes   Is the patient/caregiver able to teach back signs and symptoms related to disease process for when to call 911?  Yes   Is the patient/caregiver able to teach back the hierarchy of who to call/visit for symptoms/problems? PCP, Specialist, Home health nurse, Urgent Care, ED, 911  Yes   If the patient is a current smoker, are they able to teach back resources for cessation?  Not a  smoker   Week 1 call completed?  Yes          Yane Wasserman RN

## 2021-08-12 ENCOUNTER — TELEPHONE (OUTPATIENT)
Dept: CARDIOLOGY | Facility: CLINIC | Age: 76
End: 2021-08-12

## 2021-08-12 NOTE — TELEPHONE ENCOUNTER
S/W PT SHE STATES WAS TOLD AT HOSPITAL IF HR DROPPED BELOW 60 TO CALL OFFICE.     PT STATES HR WAS 50-57 YESTERDAY AND TODAY  NO SYMPTOMS OR PROBLEMS   BP IS RUNNING GOOD 126/52      METOPROLOL TART 25 MG BID  DILTIAZEM 60 TID      IF NO SYMPTOMS THIS IS OK   BP IS GOOD.  PT TO MONITOR BP AND HR AND CALL WITH ANY SYMPTOMS OR PROBLEMS

## 2021-08-12 NOTE — TELEPHONE ENCOUNTER
----- Message from Yane Crowley sent at 8/12/2021 11:15 AM EDT -----  Regarding: LOW HR DIDN'T KNOW IF MEDS NEED TO BE ADJUSTED? FEELS FINE  Contact: [phone]  CELL [phone]

## 2021-08-25 RX ORDER — DILTIAZEM HYDROCHLORIDE 60 MG/1
60 TABLET, FILM COATED ORAL EVERY 8 HOURS SCHEDULED
Qty: 270 TABLET | Refills: 3 | Status: SHIPPED | OUTPATIENT
Start: 2021-08-25 | End: 2021-09-28

## 2021-08-25 RX ORDER — DILTIAZEM HYDROCHLORIDE 60 MG/1
60 TABLET, FILM COATED ORAL EVERY 8 HOURS SCHEDULED
Qty: 270 TABLET | Refills: 3 | Status: SHIPPED | OUTPATIENT
Start: 2021-08-25 | End: 2021-08-25 | Stop reason: SDUPTHER

## 2021-08-31 ENCOUNTER — HOSPITAL ENCOUNTER (OUTPATIENT)
Dept: CARDIOLOGY | Facility: HOSPITAL | Age: 76
Discharge: HOME | End: 2021-08-31

## 2021-08-31 ENCOUNTER — HOSPITAL ENCOUNTER (OUTPATIENT)
Dept: CARDIOLOGY | Facility: HOSPITAL | Age: 76
Discharge: HOME | End: 2021-08-31
Admitting: NURSE PRACTITIONER

## 2021-08-31 VITALS
BODY MASS INDEX: 36.02 KG/M2 | HEIGHT: 64 IN | SYSTOLIC BLOOD PRESSURE: 104 MMHG | DIASTOLIC BLOOD PRESSURE: 50 MMHG | HEART RATE: 48 BPM | RESPIRATION RATE: 18 BRPM | WEIGHT: 211 LBS

## 2021-08-31 VITALS
BODY MASS INDEX: 36.02 KG/M2 | DIASTOLIC BLOOD PRESSURE: 85 MMHG | SYSTOLIC BLOOD PRESSURE: 148 MMHG | HEIGHT: 64 IN | WEIGHT: 211 LBS

## 2021-08-31 DIAGNOSIS — I25.10 CHRONIC CORONARY ARTERY DISEASE: ICD-10-CM

## 2021-08-31 DIAGNOSIS — I25.10 CORONARY ARTERIOSCLEROSIS IN NATIVE ARTERY: ICD-10-CM

## 2021-08-31 DIAGNOSIS — I48.0 PAROXYSMAL ATRIAL FIBRILLATION (HCC): ICD-10-CM

## 2021-08-31 DIAGNOSIS — I10 BENIGN ESSENTIAL HYPERTENSION: ICD-10-CM

## 2021-08-31 PROCEDURE — A9500 TC99M SESTAMIBI: HCPCS | Performed by: INTERNAL MEDICINE

## 2021-08-31 PROCEDURE — 93306 TTE W/DOPPLER COMPLETE: CPT

## 2021-08-31 PROCEDURE — 25010000002 REGADENOSON 0.4 MG/5ML SOLUTION: Performed by: INTERNAL MEDICINE

## 2021-08-31 PROCEDURE — 78452 HT MUSCLE IMAGE SPECT MULT: CPT

## 2021-08-31 PROCEDURE — 93018 CV STRESS TEST I&R ONLY: CPT | Performed by: INTERNAL MEDICINE

## 2021-08-31 PROCEDURE — 93016 CV STRESS TEST SUPVJ ONLY: CPT | Performed by: NURSE PRACTITIONER

## 2021-08-31 PROCEDURE — 93306 TTE W/DOPPLER COMPLETE: CPT | Performed by: INTERNAL MEDICINE

## 2021-08-31 PROCEDURE — 78452 HT MUSCLE IMAGE SPECT MULT: CPT | Performed by: INTERNAL MEDICINE

## 2021-08-31 PROCEDURE — 25010000002 PERFLUTREN (DEFINITY) 8.476 MG IN SODIUM CHLORIDE (PF) 0.9 % 10 ML INJECTION: Performed by: INTERNAL MEDICINE

## 2021-08-31 PROCEDURE — 0 TECHNETIUM SESTAMIBI: Performed by: INTERNAL MEDICINE

## 2021-08-31 PROCEDURE — 93017 CV STRESS TEST TRACING ONLY: CPT

## 2021-08-31 RX ADMIN — REGADENOSON 0.4 MG: 0.08 INJECTION, SOLUTION INTRAVENOUS at 10:41

## 2021-08-31 RX ADMIN — SODIUM CHLORIDE 2 ML: 9 INJECTION INTRAMUSCULAR; INTRAVENOUS; SUBCUTANEOUS at 12:51

## 2021-08-31 RX ADMIN — TECHNETIUM TC 99M SESTAMIBI 1 DOSE: 1 INJECTION INTRAVENOUS at 10:41

## 2021-08-31 RX ADMIN — TECHNETIUM TC 99M SESTAMIBI 1 DOSE: 1 INJECTION INTRAVENOUS at 08:22

## 2021-09-01 LAB
AORTIC DIMENSIONLESS INDEX: 0.8 (DI)
BH CV ECHO MEAS - ACS: 1.8 CM
BH CV ECHO MEAS - AO MAX PG (FULL): 4.4 MMHG
BH CV ECHO MEAS - AO MAX PG: 10.4 MMHG
BH CV ECHO MEAS - AO MEAN PG (FULL): 2.2 MMHG
BH CV ECHO MEAS - AO MEAN PG: 5.3 MMHG
BH CV ECHO MEAS - AO ROOT AREA (BSA CORRECTED): 0.97
BH CV ECHO MEAS - AO ROOT AREA: 2.9 CM^2
BH CV ECHO MEAS - AO ROOT DIAM: 1.9 CM
BH CV ECHO MEAS - AO V2 MAX: 161.5 CM/SEC
BH CV ECHO MEAS - AO V2 MEAN: 107 CM/SEC
BH CV ECHO MEAS - AO V2 VTI: 40.7 CM
BH CV ECHO MEAS - AVA(I,A): 2.7 CM^2
BH CV ECHO MEAS - AVA(I,D): 2.7 CM^2
BH CV ECHO MEAS - AVA(V,A): 2.6 CM^2
BH CV ECHO MEAS - AVA(V,D): 2.6 CM^2
BH CV ECHO MEAS - BSA(HAYCOCK): 2.1 M^2
BH CV ECHO MEAS - BSA: 2 M^2
BH CV ECHO MEAS - BZI_BMI: 36.2 KILOGRAMS/M^2
BH CV ECHO MEAS - BZI_METRIC_HEIGHT: 162.6 CM
BH CV ECHO MEAS - BZI_METRIC_WEIGHT: 95.7 KG
BH CV ECHO MEAS - EDV(CUBED): 81.8 ML
BH CV ECHO MEAS - EDV(MOD-SP2): 114 ML
BH CV ECHO MEAS - EDV(MOD-SP4): 103 ML
BH CV ECHO MEAS - EDV(TEICH): 84.9 ML
BH CV ECHO MEAS - EF(CUBED): 90.3 %
BH CV ECHO MEAS - EF(MOD-BP): 78.2 %
BH CV ECHO MEAS - EF(MOD-SP2): 79.8 %
BH CV ECHO MEAS - EF(MOD-SP4): 77.7 %
BH CV ECHO MEAS - EF(TEICH): 85.1 %
BH CV ECHO MEAS - ESV(CUBED): 8 ML
BH CV ECHO MEAS - ESV(MOD-SP2): 23 ML
BH CV ECHO MEAS - ESV(MOD-SP4): 23 ML
BH CV ECHO MEAS - ESV(TEICH): 12.7 ML
BH CV ECHO MEAS - FS: 54 %
BH CV ECHO MEAS - IVS/LVPW: 1.3
BH CV ECHO MEAS - IVSD: 1.5 CM
BH CV ECHO MEAS - LAT PEAK E' VEL: 11.1 CM/SEC
BH CV ECHO MEAS - LV DIASTOLIC VOL/BSA (35-75): 51.5 ML/M^2
BH CV ECHO MEAS - LV MASS(C)D: 219 GRAMS
BH CV ECHO MEAS - LV MASS(C)DI: 109.5 GRAMS/M^2
BH CV ECHO MEAS - LV MAX PG: 6.1 MMHG
BH CV ECHO MEAS - LV MEAN PG: 3.1 MMHG
BH CV ECHO MEAS - LV SYSTOLIC VOL/BSA (12-30): 11.5 ML/M^2
BH CV ECHO MEAS - LV V1 MAX: 123.2 CM/SEC
BH CV ECHO MEAS - LV V1 MEAN: 82.2 CM/SEC
BH CV ECHO MEAS - LV V1 VTI: 32.4 CM
BH CV ECHO MEAS - LVIDD: 4.3 CM
BH CV ECHO MEAS - LVIDS: 2 CM
BH CV ECHO MEAS - LVLD AP2: 7.9 CM
BH CV ECHO MEAS - LVLD AP4: 7.9 CM
BH CV ECHO MEAS - LVLS AP2: 5.5 CM
BH CV ECHO MEAS - LVLS AP4: 5.9 CM
BH CV ECHO MEAS - LVOT AREA (M): 3.5 CM^2
BH CV ECHO MEAS - LVOT AREA: 3.4 CM^2
BH CV ECHO MEAS - LVOT DIAM: 2.1 CM
BH CV ECHO MEAS - LVPWD: 1.2 CM
BH CV ECHO MEAS - MED PEAK E' VEL: 9.7 CM/SEC
BH CV ECHO MEAS - MR MAX PG: 100.4 MMHG
BH CV ECHO MEAS - MR MAX VEL: 501 CM/SEC
BH CV ECHO MEAS - MV A MAX VEL: 78.6 CM/SEC
BH CV ECHO MEAS - MV DEC SLOPE: 342.5 CM/SEC^2
BH CV ECHO MEAS - MV DEC TIME: 292 SEC
BH CV ECHO MEAS - MV E MAX VEL: 119.8 CM/SEC
BH CV ECHO MEAS - MV E/A: 1.5
BH CV ECHO MEAS - MV MAX PG: 7.9 MMHG
BH CV ECHO MEAS - MV MEAN PG: 2.2 MMHG
BH CV ECHO MEAS - MV P1/2T MAX VEL: 137.2 CM/SEC
BH CV ECHO MEAS - MV P1/2T: 117.3 MSEC
BH CV ECHO MEAS - MV V2 MAX: 141 CM/SEC
BH CV ECHO MEAS - MV V2 MEAN: 64.6 CM/SEC
BH CV ECHO MEAS - MV V2 VTI: 40.5 CM
BH CV ECHO MEAS - MVA P1/2T LCG: 1.6 CM^2
BH CV ECHO MEAS - MVA(P1/2T): 1.9 CM^2
BH CV ECHO MEAS - MVA(VTI): 2.7 CM^2
BH CV ECHO MEAS - RAP SYSTOLE: 3 MMHG
BH CV ECHO MEAS - RVOT AREA: 5.3 CM^2
BH CV ECHO MEAS - RVOT DIAM: 2.6 CM
BH CV ECHO MEAS - RVSP: 33.1 MMHG
BH CV ECHO MEAS - SI(AO): 59.9 ML/M^2
BH CV ECHO MEAS - SI(CUBED): 36.9 ML/M^2
BH CV ECHO MEAS - SI(LVOT): 54.7 ML/M^2
BH CV ECHO MEAS - SI(MOD-SP2): 45.5 ML/M^2
BH CV ECHO MEAS - SI(MOD-SP4): 40 ML/M^2
BH CV ECHO MEAS - SI(TEICH): 36.1 ML/M^2
BH CV ECHO MEAS - SV(AO): 119.8 ML
BH CV ECHO MEAS - SV(CUBED): 73.8 ML
BH CV ECHO MEAS - SV(LVOT): 109.5 ML
BH CV ECHO MEAS - SV(MOD-SP2): 91 ML
BH CV ECHO MEAS - SV(MOD-SP4): 80 ML
BH CV ECHO MEAS - SV(TEICH): 72.2 ML
BH CV ECHO MEAS - TAPSE (>1.6): 2.3 CM
BH CV ECHO MEAS - TR MAX VEL: 274.3 CM/SEC
BH CV ECHO MEASUREMENTS AVERAGE E/E' RATIO: 11.52
BH CV XLRA - TDI S': 12.3 CM/SEC
LEFT ATRIUM VOLUME INDEX: 26.7 ML/M2
MAXIMAL PREDICTED HEART RATE: 146 BPM
SINUS: 2.8 CM
STRESS TARGET HR: 124 BPM

## 2021-09-02 LAB
BH CV IMMEDIATE POST TECH DATA BLOOD PRESSURE: NORMAL MMHG
BH CV IMMEDIATE POST TECH DATA HEART RATE: 67 BPM
BH CV REST NUCLEAR ISOTOPE DOSE: 10.9 MCI
BH CV STRESS BP STAGE 1: NORMAL
BH CV STRESS COMMENTS STAGE 1: NORMAL
BH CV STRESS DOSE REGADENOSON STAGE 1: 0.4
BH CV STRESS DURATION MIN STAGE 1: 2
BH CV STRESS DURATION SEC STAGE 1: 5
BH CV STRESS GRADE STAGE 1: 0
BH CV STRESS HR STAGE 1: 76
BH CV STRESS METS STAGE 1: 1.5
BH CV STRESS NUCLEAR ISOTOPE DOSE: 32.1 MCI
BH CV STRESS PROTOCOL 1: NORMAL
BH CV STRESS RECOVERY BP: NORMAL MMHG
BH CV STRESS RECOVERY HR: 64 BPM
BH CV STRESS SPEED STAGE 1: 0.8
BH CV STRESS STAGE 1: 1
BH CV THREE MINUTE POST TECH DATA BLOOD PRESSURE: NORMAL MMHG
BH CV THREE MINUTE POST TECH DATA HEART RATE: 65 BPM
LV EF NUC BP: 75 %
MAXIMAL PREDICTED HEART RATE: 146 BPM
PERCENT MAX PREDICTED HR: 52.05 %
STRESS BASELINE BP: NORMAL MMHG
STRESS BASELINE HR: 51 BPM
STRESS PERCENT HR: 61 %
STRESS POST ESTIMATED WORKLOAD: 1.5 METS
STRESS POST EXERCISE DUR MIN: 2 MIN
STRESS POST EXERCISE DUR SEC: 5 SEC
STRESS POST PEAK BP: NORMAL MMHG
STRESS POST PEAK HR: 76 BPM
STRESS TARGET HR: 124 BPM

## 2021-09-07 ENCOUNTER — OFFICE VISIT (OUTPATIENT)
Dept: CARDIOLOGY | Facility: CLINIC | Age: 76
End: 2021-09-07

## 2021-09-07 VITALS
HEART RATE: 59 BPM | DIASTOLIC BLOOD PRESSURE: 87 MMHG | BODY MASS INDEX: 35.34 KG/M2 | SYSTOLIC BLOOD PRESSURE: 134 MMHG | WEIGHT: 207 LBS | HEIGHT: 64 IN

## 2021-09-07 DIAGNOSIS — I48.0 AF (PAROXYSMAL ATRIAL FIBRILLATION) (HCC): Primary | ICD-10-CM

## 2021-09-07 DIAGNOSIS — I25.10 CHRONIC CORONARY ARTERY DISEASE: ICD-10-CM

## 2021-09-07 DIAGNOSIS — I10 BENIGN ESSENTIAL HYPERTENSION: ICD-10-CM

## 2021-09-07 PROCEDURE — 93000 ELECTROCARDIOGRAM COMPLETE: CPT | Performed by: INTERNAL MEDICINE

## 2021-09-07 PROCEDURE — 99214 OFFICE O/P EST MOD 30 MIN: CPT | Performed by: INTERNAL MEDICINE

## 2021-09-07 NOTE — PROGRESS NOTES
HOSPITAL FOLLOW UP  AFIB POSSIBLE CARDIOVERSION   STRESS TEST AND ECHO RESULTS    Subjective:        Mag Silva is a 75 y.o. female who here for follow up    CC  Afib, now in nsr  HPI  75-year-old female with coronary artery disease, hypertension, here for the follow-up patient has been feeling weak and tired heart rate has been running low     Problems Addressed this Visit        Cardiac and Vasculature    Benign essential hypertension    Chronic coronary artery disease      Other Visit Diagnoses     AF (paroxysmal atrial fibrillation) (CMS/[facility])    -  Primary      Diagnoses       Codes Comments    AF (paroxysmal atrial fibrillation) (CMS/HCC)    -  Primary ICD-10-CM: I48.0  ICD-9-CM: 427.31     Chronic coronary artery disease     ICD-10-CM: I25.10  ICD-9-CM: 414.00     Benign essential hypertension     ICD-10-CM: I10  ICD-9-CM: 401.1         .    The following portions of the patient's history were reviewed and updated as appropriate: allergies, current medications, past family history, past medical history, past social history, past surgical history and problem list.    Past Medical History:   Diagnosis Date   • Chest pain    • COPD (chronic obstructive pulmonary disease) (CMS/[facility])    • Coronary artery disease    • Crescendo angina (CMS/[facility])    • Dyslipidemia    • Hypertension    • PVC (premature ventricular contraction)    • Syncope      reports that she quit smoking about 17 years ago. Her smoking use included cigarettes. She has a 30.00 pack-year smoking history. She has never used smokeless tobacco. She reports that she does not drink alcohol and does not use drugs.   Family History   Problem Relation Age of Onset   • Stroke Mother    • Asthma Father    • Cancer Father        Review of Systems  Constitutional: No wt loss, fever, fatigue  Gastrointestinal: No nausea, abdominal pain  Behavioral/Psych: No insomnia or anxiety   Cardiovascular weak and tired  Objective:       Physical Exam  /87   Pulse  "59   Ht 162.6 cm (64\")   Wt 93.9 kg (207 lb)   BMI 35.53 kg/m²   General appearance: No acute changes   Neck: Trachea midline; NECK, supple, no thyromegaly or lymphadenopathy   Lungs: Normal size and shape, normal breath sounds, equal distribution of air, no rales and rhonchi   CV: S1-S2 regular, no murmurs, no rub, no gallop   Abdomen: Soft, non-tender; no masses , no abnormal abdominal sounds   Extremities: No deformity , normal color , no peripheral edema   Skin: Normal temperature, turgor and texture; no rash, ulcers            ECG 12 Lead    Date/Time: 9/7/2021 3:15 PM  Performed by: Tony Ayala MD  Authorized by: Tony Ayala MD   Comparison: compared with previous ECG   Similar to previous ECG  Rhythm: sinus rhythm    Clinical impression: non-specific ECG        Interpretation Summary       · Findings consistent with a normal ECG stress test.  · Left ventricular ejection fraction is hyperdynamic (Calculated EF > 70%). .  · Myocardial perfusion imaging indicates a normal myocardial perfusion study with no evidence of ischemia.  · Impressions are consistent with a low risk study       Interpretation Summary    · Calculated left ventricular EF = 78.2% Estimated left ventricular EF was in agreement with the calculated left ventricular EF.  · Left ventricular diastolic function was normal.  · There is no evidence of pericardial effusion. .         Echocardiogram:        Current Outpatient Medications:   •  Acetaminophen (TYLENOL 8 HOUR ARTHRITIS PAIN PO), Take  by mouth., Disp: , Rfl:   •  apixaban (ELIQUIS) 5 MG tablet tablet, Take 1 tablet by mouth Every 12 (Twelve) Hours. Indications: Atrial Fibrillation, Atrial Fibrillation, Disp: 60 tablet, Rfl: 12  •  aspirin 81 MG chewable tablet, Chew 81 mg Every Night., Disp: , Rfl:   •  atorvastatin (LIPITOR) 10 MG tablet, Take 10 mg by mouth Daily., Disp: , Rfl:   •  Cholecalciferol (VITAMIN D3) 2000 UNITS capsule, Take 2,000 Units by mouth Every " Night., Disp: , Rfl:   •  clopidogrel (PLAVIX) 75 MG tablet, TAKE 1 TABLET EVERY DAY, Disp: 90 tablet, Rfl: 3  •  dilTIAZem (CARDIZEM) 60 MG tablet, Take 1 tablet by mouth Every 8 (Eight) Hours., Disp: 270 tablet, Rfl: 3  •  famotidine (PEPCID) 20 MG tablet, Take 20 mg by mouth 2 (Two) Times a Day As Needed., Disp: , Rfl:   •  metoprolol tartrate (LOPRESSOR) 25 MG tablet, Take 1 tablet by mouth Every 12 (Twelve) Hours., Disp: 180 tablet, Rfl: 3  •  Multiple Vitamins-Minerals (MULTI COMPLETE PO), Take  by mouth., Disp: , Rfl:   •  nitroglycerin (NITROSTAT) 0.4 MG SL tablet, 1 under the tongue as needed for angina, may repeat q5mins for up three doses, Disp: 25 tablet, Rfl: 3  •  olmesartan-hydrochlorothiazide (Benicar HCT) 40-25 MG per tablet, Take 1 tablet by mouth Daily., Disp: 90 tablet, Rfl: 3  •  Omega-3 Fatty Acids (FISH OIL) 1000 MG capsule capsule, Take  by mouth Daily With Breakfast., Disp: , Rfl:   •  tiotropium bromide-olodaterol (STIOLTO RESPIMAT) 2.5-2.5 MCG/ACT aerosol solution inhaler, Inhale Daily., Disp: , Rfl:   •  zolpidem (AMBIEN) 10 MG tablet, Take 10 mg by mouth At Night As Needed for sleep., Disp: , Rfl:    Assessment:        Patient Active Problem List   Diagnosis   • Coronary arteriosclerosis in native artery   • Benign essential hypertension   • Chronic coronary artery disease   • Chest pain   • Chronic obstructive pulmonary disease (CMS/HCC)   • Dyslipidemia   • Ventricular premature beats   • Syncope   • Hyperlipidemia   • Overweight   • New onset atrial fibrillation (CMS/HCC)               Plan:            ICD-10-CM ICD-9-CM   1. AF (paroxysmal atrial fibrillation) (CMS/HCC)  I48.0 427.31   2. Chronic coronary artery disease  I25.10 414.00   3. Benign essential hypertension  I10 401.1     1. AF (paroxysmal atrial fibrillation) (CMS/HCC)  Back in normal sinus rhythm    2. Chronic coronary artery disease  No angina pectoris    3. Benign essential hypertension  Blood pressure under control        Dc diltizem due to slow hr  Stop plavix con asa  And elloquise  Pros and cons of this new medication / change medication has been explained to  the patient    Possible side effects has been explained    Associated need of the blood  Work has been explained    Need for the compliance of the medication has been explained      Pt back in nsr    See in 6 month  COUNSELING:    Mag Alegre was given to patient for the following topics: diagnostic results, risk factor reductions, impressions, risks and benefits of treatment options and importance of treatment compliance .       SMOKING COUNSELING:    [unfilled]    Dictated using Dragon dictation

## 2021-09-28 ENCOUNTER — OFFICE VISIT (OUTPATIENT)
Dept: CARDIOLOGY | Facility: CLINIC | Age: 76
End: 2021-09-28

## 2021-09-28 VITALS
SYSTOLIC BLOOD PRESSURE: 100 MMHG | HEART RATE: 65 BPM | WEIGHT: 211 LBS | DIASTOLIC BLOOD PRESSURE: 61 MMHG | BODY MASS INDEX: 36.02 KG/M2 | HEIGHT: 64 IN

## 2021-09-28 DIAGNOSIS — I10 BENIGN ESSENTIAL HYPERTENSION: ICD-10-CM

## 2021-09-28 DIAGNOSIS — I48.0 PAROXYSMAL ATRIAL FIBRILLATION (HCC): Primary | ICD-10-CM

## 2021-09-28 DIAGNOSIS — E78.5 DYSLIPIDEMIA: ICD-10-CM

## 2021-09-28 DIAGNOSIS — I25.10 CORONARY ARTERIOSCLEROSIS IN NATIVE ARTERY: ICD-10-CM

## 2021-09-28 PROCEDURE — 93000 ELECTROCARDIOGRAM COMPLETE: CPT

## 2021-09-28 PROCEDURE — 99214 OFFICE O/P EST MOD 30 MIN: CPT

## 2021-09-28 RX ORDER — LEVOCETIRIZINE DIHYDROCHLORIDE 5 MG/1
5 TABLET, FILM COATED ORAL DAILY
COMMUNITY
Start: 2021-09-14

## 2021-09-28 NOTE — PROGRESS NOTES
" Subjective:        Mag Silva is a 75 y.o. female who here for follow up    A. Fib      HPI  This is a 75-year-old female, who is new to me.  She has a history to include COPD, CAD, dyslipidemia, hypertension, paroxysmal A. fib.  Echo 8/31/2021 revealed EF 78.2% normal LV diastolic function stress test 8/31/2021 myocardial perfusion imaging indicated normal myocardial perfusion study with no evidence of ischemia.  Carotid artery duplex and August 2017 revealed bilateral moderate carotid artery stenosis.  Lipid panel 8/4/2021 , HDL 37, LDL 55, triglycerides 193 and AST/ALT WNL.    She was admitted on 8/2/21 and discharged on 8/5/2021, she had A. fib with RVR at that time.  At discharge it was determined she would need to follow-up in the office to discuss possible cardioversion.  She was seen by Dr. Ayala on 9/7/2021.  She was in sinus rhythm at that time.  Diltiazem was discontinued due to bradycardia.  Plavix was stopped and aspirin and Eliquis were continued.    She reports that on Saturday, she felt a \"thick feeling\" in her throat as she did when she was in afib with RVR in the hospital. She reports checking her HR and found that is was in the 150s. She reports that it was elevated for at least 1 hour, but felt poorly for 2 days. she reports that she went to her PCP Dr Caceres yesterday and was told by Dr Caceres her EKG showed afib. She reports that today she has no chest pain, palpitations, syncope/near syncope.       The following portions of the patient's history were reviewed and updated as appropriate: allergies, current medications, past family history, past medical history, past social history, past surgical history and problem list.    Past Medical History:   Diagnosis Date   • Atrial fibrillation (CMS/HCC)    • Chest pain    • COPD (chronic obstructive pulmonary disease) (CMS/HCC)    • Coronary artery disease    • Crescendo angina (CMS/HCC)    • Dyslipidemia    • Hypertension    • PVC " (premature ventricular contraction)    • Syncope          reports that she quit smoking about 17 years ago. Her smoking use included cigarettes. She has a 30.00 pack-year smoking history. She has never used smokeless tobacco. She reports that she does not drink alcohol and does not use drugs.     Family History   Problem Relation Age of Onset   • Stroke Mother    • Asthma Father    • Cancer Father        ROS     Review of Systems  Constitutional: No wt loss, fever, + fatigue  Gastrointestinal: No nausea, abdominal pain  Behavioral/Psych: No insomnia or anxiety  Cardiovascular Denies chest pain, + palpitations       Objective:           Vitals and nursing note reviewed.   Constitutional:       Appearance: Well-developed.   HENT:      Head: Normocephalic.      Right Ear: External ear normal.      Left Ear: External ear normal.   Neck:      Vascular: No JVD.   Pulmonary:      Effort: Pulmonary effort is normal. No respiratory distress.      Breath sounds: Normal breath sounds. No stridor. No rales.   Cardiovascular:      Normal rate. Regular rhythm.      No gallop.   Pulses:     Intact distal pulses.   Abdominal:      General: Bowel sounds are normal. There is no distension.      Palpations: Abdomen is soft.      Tenderness: There is no abdominal tenderness. There is no guarding.   Musculoskeletal: Normal range of motion.         General: No tenderness.      Cervical back: Normal range of motion. Skin:     General: Skin is warm.   Neurological:      Mental Status: Alert and oriented to person, place, and time.      Deep Tendon Reflexes: Reflexes are normal and symmetric.   Psychiatric:         Judgment: Judgment normal.           ECG 12 Lead    Date/Time: 9/28/2021 9:42 AM  Performed by: Anne Lizama APRN  Authorized by: Anne Lizama APRN   Comparison: compared with previous ECG from 9/7/2021  Similar to previous ECG  Rhythm: sinus rhythm  Rate: normal  BPM: 66    Clinical impression: normal  ECG            Echo 8/31/21  Interpretation Summary    · Calculated left ventricular EF = 78.2% Estimated left ventricular EF was in agreement with the calculated left ventricular EF.  · Left ventricular diastolic function was normal.  · There is no evidence of pericardial effusion. .     Stress test 8/31/21  Interpretation Summary       · Findings consistent with a normal ECG stress test.  · Left ventricular ejection fraction is hyperdynamic (Calculated EF > 70%). .  · Myocardial perfusion imaging indicates a normal myocardial perfusion study with no evidence of ischemia.  · Impressions are consistent with a low risk study.         Carotid Artery duplex 8/2017  Interpretation Summary    · Proximal right internal carotid artery moderate stenosis.  · Proximal left internal carotid artery moderate stenosis.     Cardiac Cath 10/2015  FINAL CONCLUSION:  1.  Normal left main.   2.  A 30% to 40% proximal LAD, 40% diagonal.   3.  A 40% OM intrastent stenosis and distal RCA 99% down to 0% with a 3.0 x 15  Xience dilated to 3.2.   4.  Normal left ventriculogram.      DISCUSSION: Results of angioplasty: Successful angioplasty and stent to the  distal RCA reduced from 99% with thrombus down to 0% with a 3.0 x 15 dilated to  3.2 Xience stent    Current Outpatient Medications:   •  Acetaminophen (TYLENOL 8 HOUR ARTHRITIS PAIN PO), Take  by mouth., Disp: , Rfl:   •  apixaban (ELIQUIS) 5 MG tablet tablet, Take 1 tablet by mouth Every 12 (Twelve) Hours. Indications: Atrial Fibrillation, Atrial Fibrillation, Disp: 60 tablet, Rfl: 12  •  aspirin 81 MG chewable tablet, Chew 81 mg Every Night., Disp: , Rfl:   •  atorvastatin (LIPITOR) 10 MG tablet, Take 10 mg by mouth Daily., Disp: , Rfl:   •  Cholecalciferol (VITAMIN D3) 2000 UNITS capsule, Take 2,000 Units by mouth Every Night., Disp: , Rfl:   •  famotidine (PEPCID) 20 MG tablet, Take 20 mg by mouth 2 (Two) Times a Day As Needed., Disp: , Rfl:   •  levocetirizine (XYZAL) 5 MG tablet,  Take 5 mg by mouth Daily., Disp: , Rfl:   •  metoprolol tartrate (LOPRESSOR) 25 MG tablet, Take 1 tablet by mouth Every 12 (Twelve) Hours., Disp: 180 tablet, Rfl: 3  •  Multiple Vitamins-Minerals (MULTI COMPLETE PO), Take  by mouth., Disp: , Rfl:   •  olmesartan-hydrochlorothiazide (Benicar HCT) 40-25 MG per tablet, Take 1 tablet by mouth Daily., Disp: 90 tablet, Rfl: 3  •  Omega-3 Fatty Acids (FISH OIL) 1000 MG capsule capsule, Take  by mouth Daily With Breakfast., Disp: , Rfl:   •  tiotropium bromide-olodaterol (STIOLTO RESPIMAT) 2.5-2.5 MCG/ACT aerosol solution inhaler, Inhale Daily., Disp: , Rfl:   •  zolpidem (AMBIEN) 10 MG tablet, Take 10 mg by mouth At Night As Needed for sleep., Disp: , Rfl:   •  nitroglycerin (NITROSTAT) 0.4 MG SL tablet, 1 under the tongue as needed for angina, may repeat q5mins for up three doses, Disp: 25 tablet, Rfl: 3     Assessment:        Patient Active Problem List   Diagnosis   • Coronary arteriosclerosis in native artery   • Benign essential hypertension   • Chronic coronary artery disease   • Chest pain   • Chronic obstructive pulmonary disease (CMS/HCC)   • Dyslipidemia   • Ventricular premature beats   • Syncope   • Hyperlipidemia   • Overweight   • New onset atrial fibrillation (CMS/HCC)               Plan:   1.  Paroxysmal A. Fib: Patient is in sinus rhythm now. She is anticoagulated on Eliquis. Considering her episodes of PAF, will obtain 2wk holter to determine burden of AF. Advised that is HR were to become elevated again may take an extra metoprolol.     Pros and cons as well as indication of the anticoagulation has been explained to the patient in detail. There are no obvious complications at this stage. Risk of  the bleedings has been explained. Need for the regular blood workup and adjust the dose has been explained. Need for proper follow-up on anticoagulation also has been explained.     2.  CAD-denies chest pain. Previous ischemic work-up as above. Continue  beta-blockade, statin, aspirin, and olmesartan.    Risk reduction for the coronary artery disease, controlling the blood pressure, blood sugar management, cholesterol management, exercise, stress management, and proper compliance with medications and follow-up has been discussed    3.  Hypertension: BP controlled with olmesartan/HCTZ and metoprolol. Continue current regimen.    Importance of controlling hypertension and blood pressure  checkup on the regular basis has been explained. Hypertension as a silent killer has been discussed. Risk reduction of the weight and regular exercises to control the hypertension has been explained.    4.  Dyslipidemia-Lipid panel as above. Continue statin. She states that her PCP manages her cholesterol and labs.     Risk of the hyperlipidemia, importance of the treatment has been explained. Pros and cons of the statins has been explained. Regular blood workup as well as side effects including the liver failure, myelopathy death has been explained.           No diagnosis found.    There are no diagnoses linked to this encounter.    COUNSELING: Samir Alegre was given to patient for the following topics: diagnostic results, risk factor reductions, impressions, risks and benefits of treatment options and importance of treatment compliance .         SMOKING COUNSELING:former smoker, quit in 2004    SR today. Will take extra lopressor for episodes of elevated HR or Afib. Will have her wear 2 week Holter to determine AF burden. Follow up for results, or sooner if needed. Reviewed and discussed plan with Dr Ayala.     Sincerely,   JENNIFER Márquez  [facility]  09/28/21  10:10 EDT    EMR Dragon/Transcription disclaimer:   Much of this encounter note is an electronic transcription/translation of spoken language to printed text. The electronic translation of spoken language may permit erroneous, or at times, nonsensical words or phrases to be  inadvertently transcribed; Although I have reviewed the note for such errors, some may still exist.

## 2021-10-07 ENCOUNTER — TELEPHONE (OUTPATIENT)
Dept: CARDIOLOGY | Facility: CLINIC | Age: 76
End: 2021-10-07

## 2021-10-07 NOTE — TELEPHONE ENCOUNTER
----- Message from Yane Crowley sent at 10/6/2021 10:50 AM EDT -----  Regarding: LETTER FOR HER TO STOP BLOOD THINNER 2 DAYS PRIOR TO DENTAL PROCEDURE 10/12  SEND TO DR TRENT  PHONE [phone]

## 2021-10-07 NOTE — TELEPHONE ENCOUNTER
----- Message from Yane Crowley sent at 10/7/2021 11:26 AM EDT -----  Regarding: CLEARANCE  DR TRENT'S   [phone]

## 2021-11-02 ENCOUNTER — OFFICE VISIT (OUTPATIENT)
Dept: CARDIOLOGY | Facility: CLINIC | Age: 76
End: 2021-11-02

## 2021-11-02 VITALS
SYSTOLIC BLOOD PRESSURE: 118 MMHG | WEIGHT: 209 LBS | HEIGHT: 64 IN | BODY MASS INDEX: 35.68 KG/M2 | DIASTOLIC BLOOD PRESSURE: 68 MMHG | HEART RATE: 40 BPM

## 2021-11-02 DIAGNOSIS — Z79.01 ANTICOAGULATED: ICD-10-CM

## 2021-11-02 DIAGNOSIS — I25.10 CHRONIC CORONARY ARTERY DISEASE: Primary | ICD-10-CM

## 2021-11-02 DIAGNOSIS — E66.3 OVERWEIGHT: ICD-10-CM

## 2021-11-02 DIAGNOSIS — I10 BENIGN ESSENTIAL HYPERTENSION: ICD-10-CM

## 2021-11-02 DIAGNOSIS — E78.5 HYPERLIPIDEMIA, UNSPECIFIED HYPERLIPIDEMIA TYPE: ICD-10-CM

## 2021-11-02 PROCEDURE — 99214 OFFICE O/P EST MOD 30 MIN: CPT | Performed by: INTERNAL MEDICINE

## 2021-11-02 NOTE — PROGRESS NOTES
Adri   Subjective:        Mag Silva is a 76 y.o. female who here for follow up    CC  Follow-up atrial fibrillation  HPI  76-year-old female with known history of the coronary artery disease, benign essential arterial hypertension hyperlipidemia on chronic anticoagulation here for the follow-up with a Holter monitor which showed no atrial fibrillation denies any chest pains or tightness in the chest     Problems Addressed this Visit        Cardiac and Vasculature    Benign essential hypertension    Chronic coronary artery disease - Primary    Hyperlipidemia       Coag and Thromboembolic    Anticoagulated       Endocrine and Metabolic    Overweight      Diagnoses       Codes Comments    Chronic coronary artery disease    -  Primary ICD-10-CM: I25.10  ICD-9-CM: 414.00     Benign essential hypertension     ICD-10-CM: I10  ICD-9-CM: 401.1     Hyperlipidemia, unspecified hyperlipidemia type     ICD-10-CM: E78.5  ICD-9-CM: 272.4     Overweight     ICD-10-CM: E66.3  ICD-9-CM: 278.02     Anticoagulated     ICD-10-CM: Z79.01  ICD-9-CM: V58.61         .    The following portions of the patient's history were reviewed and updated as appropriate: allergies, current medications, past family history, past medical history, past social history, past surgical history and problem list.    Past Medical History:   Diagnosis Date   • Atrial fibrillation (HCC)    • Chest pain    • COPD (chronic obstructive pulmonary disease) (HCC)    • Coronary artery disease    • Crescendo angina (HCC)    • Dyslipidemia    • Hypertension    • PVC (premature ventricular contraction)    • Syncope      reports that she quit smoking about 17 years ago. Her smoking use included cigarettes. She has a 30.00 pack-year smoking history. She has never used smokeless tobacco. She reports that she does not drink alcohol and does not use drugs.   Family History   Problem Relation Age of Onset   • Stroke Mother    • Asthma Father    • Cancer Father   "      Review of Systems  Constitutional: No wt loss, fever, fatigue  Gastrointestinal: No nausea, abdominal pain  Behavioral/Psych: No insomnia or anxiety   Cardiovascular no chest pains or tightness in the chest  Objective:       Physical Exam  /68   Pulse (!) 40   Ht 162.6 cm (64\")   Wt 94.8 kg (209 lb)   BMI 35.87 kg/m²   General appearance: No acute changes   Neck: Trachea midline; NECK, supple, no thyromegaly or lymphadenopathy   Lungs: Normal size and shape, normal breath sounds, equal distribution of air, no rales and rhonchi   CV: S1-S2 regular, no murmurs, no rub, no gallop   Abdomen: Soft, non-tender; no masses , no abnormal abdominal sounds   Extremities: No deformity , normal color , no peripheral edema   Skin: Normal temperature, turgor and texture; no rash, ulcers          Procedures      Echocardiogram:        Current Outpatient Medications:   •  Acetaminophen (TYLENOL 8 HOUR ARTHRITIS PAIN PO), Take  by mouth., Disp: , Rfl:   •  apixaban (ELIQUIS) 5 MG tablet tablet, Take 1 tablet by mouth Every 12 (Twelve) Hours. Indications: Atrial Fibrillation, Atrial Fibrillation, Disp: 60 tablet, Rfl: 12  •  aspirin 81 MG chewable tablet, Chew 81 mg Every Night., Disp: , Rfl:   •  atorvastatin (LIPITOR) 10 MG tablet, Take 10 mg by mouth Daily., Disp: , Rfl:   •  Cholecalciferol (VITAMIN D3) 2000 UNITS capsule, Take 2,000 Units by mouth Every Night., Disp: , Rfl:   •  famotidine (PEPCID) 20 MG tablet, Take 20 mg by mouth 2 (Two) Times a Day As Needed., Disp: , Rfl:   •  levocetirizine (XYZAL) 5 MG tablet, Take 5 mg by mouth Daily., Disp: , Rfl:   •  metoprolol tartrate (LOPRESSOR) 25 MG tablet, Take 1 tablet by mouth Every 12 (Twelve) Hours., Disp: 180 tablet, Rfl: 3  •  Multiple Vitamins-Minerals (MULTI COMPLETE PO), Take  by mouth., Disp: , Rfl:   •  nitroglycerin (NITROSTAT) 0.4 MG SL tablet, 1 under the tongue as needed for angina, may repeat q5mins for up three doses, Disp: 25 tablet, Rfl: 3  •  " olmesartan-hydrochlorothiazide (Benicar HCT) 40-25 MG per tablet, Take 1 tablet by mouth Daily., Disp: 90 tablet, Rfl: 3  •  Omega-3 Fatty Acids (FISH OIL) 1000 MG capsule capsule, Take  by mouth Daily With Breakfast., Disp: , Rfl:   •  tiotropium bromide-olodaterol (STIOLTO RESPIMAT) 2.5-2.5 MCG/ACT aerosol solution inhaler, Inhale Daily., Disp: , Rfl:   •  zolpidem (AMBIEN) 10 MG tablet, Take 10 mg by mouth At Night As Needed for sleep., Disp: , Rfl:    Assessment:        Patient Active Problem List   Diagnosis   • Coronary arteriosclerosis in native artery   • Benign essential hypertension   • Chronic coronary artery disease   • Chest pain   • Chronic obstructive pulmonary disease (HCC)   • Dyslipidemia   • Ventricular premature beats   • Syncope   • Hyperlipidemia   • Overweight   • New onset atrial fibrillation (HCC)               Plan:            ICD-10-CM ICD-9-CM   1. Chronic coronary artery disease  I25.10 414.00   2. Benign essential hypertension  I10 401.1   3. Hyperlipidemia, unspecified hyperlipidemia type  E78.5 272.4   4. Overweight  E66.3 278.02   5. Anticoagulated  Z79.01 V58.61     1. Chronic coronary artery disease  No angina pectoris    2. Benign essential hypertension  Blood pressure controlled    3. Hyperlipidemia, unspecified hyperlipidemia type  Continue current treatment    4. Overweight  Counseling done    5. Anticoagulated  Continue current treatment    COUNSELING:    Mag Alegre was given to patient for the following topics: diagnostic results, risk factor reductions, impressions, risks and benefits of treatment options and importance of treatment compliance .     holter ok  No afib  See in 6 months  Tired  Reduce lopressor once a day at night  SMOKING COUNSELING:    [unfilled]    Dictated using Dragon dictation  r Monitor Results

## 2021-11-03 PROBLEM — Z79.01 ANTICOAGULATED: Status: ACTIVE | Noted: 2021-11-03

## 2022-02-07 ENCOUNTER — TELEPHONE (OUTPATIENT)
Dept: CARDIOLOGY | Facility: CLINIC | Age: 77
End: 2022-02-07

## 2022-02-07 NOTE — TELEPHONE ENCOUNTER
----- Message from Yane Crowley sent at 2/7/2022 11:57 AM EST -----  Regarding: SAMPLES OF FELICIANO SHE IS IN THE [facility]  Contact: [phone]  CALL WHEN READY TO  THANKS    Samples are ready for  and patient is informed

## 2022-05-03 ENCOUNTER — OFFICE VISIT (OUTPATIENT)
Dept: CARDIOLOGY | Facility: CLINIC | Age: 77
End: 2022-05-03

## 2022-05-03 VITALS
SYSTOLIC BLOOD PRESSURE: 150 MMHG | BODY MASS INDEX: 35.51 KG/M2 | WEIGHT: 208 LBS | DIASTOLIC BLOOD PRESSURE: 80 MMHG | HEART RATE: 59 BPM | HEIGHT: 64 IN

## 2022-05-03 DIAGNOSIS — Z79.01 ANTICOAGULATED: ICD-10-CM

## 2022-05-03 DIAGNOSIS — I10 BENIGN ESSENTIAL HYPERTENSION: ICD-10-CM

## 2022-05-03 DIAGNOSIS — E78.5 HYPERLIPIDEMIA, UNSPECIFIED HYPERLIPIDEMIA TYPE: ICD-10-CM

## 2022-05-03 DIAGNOSIS — I25.10 CORONARY ARTERIOSCLEROSIS IN NATIVE ARTERY: Primary | ICD-10-CM

## 2022-05-03 DIAGNOSIS — I48.0 PAROXYSMAL ATRIAL FIBRILLATION: ICD-10-CM

## 2022-05-03 PROCEDURE — 99214 OFFICE O/P EST MOD 30 MIN: CPT

## 2022-05-03 PROCEDURE — 93000 ELECTROCARDIOGRAM COMPLETE: CPT

## 2022-05-03 NOTE — PROGRESS NOTES
Subjective:        Mag Silva is a 76 y.o. female who here for follow up    Atrial fibrillation      HPI     This is a 75-year-old female, who is current with this provider, follows up for management of atrial fibrillation.  She has CAD, paroxysmal atrial fibrillation anticoagulated on Eliquis, dyslipidemia, hypertension, COPD.  Holter monitor 10/4/2021 showed NSR with frequent PACs, PVCs, couplets, NS SVTs.  Echo 8/31/2021 EF 78%, normal LV diastolic function.  Stress test 8/31/2021 myocardial fusion imaging indicates a normal study with no evidence of ischemia.  Carotid artery duplex in August 2017 revealed bilateral moderate carotid artery stenosis.  Lipid panel 8/4/2021 , HDL 37, LDL 55, trig 193.  She was seen in office on 11/2/2021 for Holter monitor results.  Per Dr. Ayala Holter is okay, no Atrial fibrillation.  Her Lopressor was reduced to once a day to be taken at nighttime due to some feelings of fatigue.      The following portions of the patient's history were reviewed and updated as appropriate: allergies, current medications, past family history, past medical history, past social history, past surgical history and problem list.    Past Medical History:   Diagnosis Date   • Atrial fibrillation (HCC)    • Chest pain    • COPD (chronic obstructive pulmonary disease) (HCC)    • Coronary artery disease    • Crescendo angina (HCC)    • Dyslipidemia    • Hypertension    • PVC (premature ventricular contraction)    • Syncope          reports that she quit smoking about 18 years ago. Her smoking use included cigarettes. She has a 30.00 pack-year smoking history. She has never used smokeless tobacco. She reports that she does not drink alcohol and does not use drugs.     Family History   Problem Relation Age of Onset   • Stroke Mother    • Asthma Father    • Cancer Father        ROS     Review of Systems  Constitutional: No wt loss, fever,  fatigue  Gastrointestinal: No nausea, abdominal  pain  Behavioral/Psych: No insomnia or anxiety  Cardiovascular no chest pain or palpitations       Objective:           Vitals and nursing note reviewed.   Constitutional:       Appearance: Well-developed.   HENT:      Head: Normocephalic.      Right Ear: External ear normal.      Left Ear: External ear normal.   Neck:      Vascular: No JVD.   Pulmonary:      Effort: Pulmonary effort is normal. No respiratory distress.      Breath sounds: Normal breath sounds. No stridor. No rales.   Cardiovascular:      Normal rate. Regular rhythm.      No gallop.   Pulses:     Intact distal pulses.   Abdominal:      General: Bowel sounds are normal. There is no distension.      Palpations: Abdomen is soft.      Tenderness: There is no abdominal tenderness. There is no guarding.   Musculoskeletal: Normal range of motion.         General: No tenderness.      Cervical back: Normal range of motion. Skin:     General: Skin is warm.   Neurological:      Mental Status: Alert and oriented to person, place, and time.      Deep Tendon Reflexes: Reflexes are normal and symmetric.   Psychiatric:         Judgment: Judgment normal.           ECG 12 Lead    Date/Time: 5/3/2022 1:31 PM  Performed by: Anne Lizama APRN  Authorized by: Anne Lizama APRN   Comparison: compared with previous ECG from 9/28/2021  Similar to previous ECG  Rhythm: sinus rhythm  Ectopy: unifocal PVCs  Rate: normal  BPM: 59    Clinical impression: abnormal EKG          Interpretation Summary    · Mag Silva monitored for 12d 10h starting on 10/04/2021 The average heart rate, excluding ectopy, was 64 BPM with a minumim of 46 BPM on Day 9 and a maximum of 114 BPM on Day 9. SVE: [place] was 0.68% , max count per 24 hours 635 SVT:126 event, longest event 26 beats on day 5, fastest event 169 bpm on day 5 PVC: burden was 2.96%, max count per 24 hours 2764,1 disparate morphologies VENTRICULAR TACHYCARDIA: 4 events, longest event 6 at Day 8 fastest rate 193 bpm at 8  :  · An abnormal monitor study.  · NSR, FREQUENT PACS, PVCS, COUPLETS, NSSVTS      Echo 8/31/21  Interpretation Summary    · Calculated left ventricular EF = 78.2% Estimated left ventricular EF was in agreement with the calculated left ventricular EF.  · Left ventricular diastolic function was normal.  · There is no evidence of pericardial effusion. .     Stress test 8/31/21  Interpretation Summary       · Findings consistent with a normal ECG stress test.  · Left ventricular ejection fraction is hyperdynamic (Calculated EF > 70%). .  · Myocardial perfusion imaging indicates a normal myocardial perfusion study with no evidence of ischemia.  · Impressions are consistent with a low risk study.         Carotid Artery duplex 8/2017  Interpretation Summary    · Proximal right internal carotid artery moderate stenosis.  · Proximal left internal carotid artery moderate stenosis.     Cardiac Cath 10/2015  FINAL CONCLUSION:  1.  Normal left main.   2.  A 30% to 40% proximal LAD, 40% diagonal.   3.  A 40% OM intrastent stenosis and distal RCA 99% down to 0% with a 3.0 x 15  Xience dilated to 3.2.   4.  Normal left ventriculogram.      DISCUSSION: Results of angioplasty: Successful angioplasty and stent to the  distal RCA reduced from 99% with thrombus down to 0% with a 3.0 x 15 dilated to  3.2 Xience stent    Current Outpatient Medications:   •  Acetaminophen (TYLENOL 8 HOUR ARTHRITIS PAIN PO), Take  by mouth., Disp: , Rfl:   •  apixaban (ELIQUIS) 5 MG tablet tablet, Take 1 tablet by mouth Every 12 (Twelve) Hours. Indications: Atrial Fibrillation, Atrial Fibrillation, Disp: 60 tablet, Rfl: 12  •  aspirin 81 MG chewable tablet, Chew 81 mg Every Night., Disp: , Rfl:   •  atorvastatin (LIPITOR) 10 MG tablet, Take 10 mg by mouth Daily., Disp: , Rfl:   •  Cholecalciferol (VITAMIN D3) 2000 UNITS capsule, Take 2,000 Units by mouth Every Night., Disp: , Rfl:   •  famotidine (PEPCID) 20 MG tablet, Take 20 mg by mouth 2 (Two) Times a  Day As Needed., Disp: , Rfl:   •  levocetirizine (XYZAL) 5 MG tablet, Take 5 mg by mouth Daily., Disp: , Rfl:   •  metoprolol tartrate (LOPRESSOR) 25 MG tablet, Take 1 tablet by mouth Every 12 (Twelve) Hours., Disp: 180 tablet, Rfl: 3  •  Multiple Vitamins-Minerals (MULTI COMPLETE PO), Take  by mouth., Disp: , Rfl:   •  nitroglycerin (NITROSTAT) 0.4 MG SL tablet, 1 under the tongue as needed for angina, may repeat q5mins for up three doses, Disp: 25 tablet, Rfl: 3  •  olmesartan-hydrochlorothiazide (Benicar HCT) 40-25 MG per tablet, Take 1 tablet by mouth Daily., Disp: 90 tablet, Rfl: 3  •  Omega-3 Fatty Acids (FISH OIL) 1000 MG capsule capsule, Take  by mouth Daily With Breakfast., Disp: , Rfl:   •  tiotropium bromide-olodaterol (STIOLTO RESPIMAT) 2.5-2.5 MCG/ACT aerosol solution inhaler, Inhale Daily., Disp: , Rfl:   •  zolpidem (AMBIEN) 10 MG tablet, Take 10 mg by mouth At Night As Needed for sleep., Disp: , Rfl:      Assessment:        Patient Active Problem List   Diagnosis   • Coronary arteriosclerosis in native artery   • Benign essential hypertension   • Chronic coronary artery disease   • Chest pain   • Chronic obstructive pulmonary disease (HCC)   • Dyslipidemia   • Ventricular premature beats   • Syncope   • Hyperlipidemia   • Overweight   • New onset atrial fibrillation (HCC)   • Anticoagulated               Plan:   1.  Paroxysmal A. Fib: Controlled. ECG today SR with PVC. Continue eliquis. Metoprolol. May take an extra metoprolol for palpitaitons or tachycardia.     Pros and cons as well as indication of the anticoagulation has been explained to the patient in detail. There are no obvious complications at this stage. Risk of  the bleedings has been explained. Need for the regular blood workup and adjust the dose has been explained. Need for proper follow-up on anticoagulation also has been explained.     2.  CAD: no chest pain, previous ischemic work-up as above.  Continue aspirin, statin,  beta-blockade.    Risk reduction for the coronary artery disease, controlling the blood pressure, blood sugar management, cholesterol management, exercise, stress management, and proper compliance with medications and follow-up has been discussed    3.  Hypertension:  Controlled.  Continue current management.    Importance of controlling hypertension and blood pressure  checkup on the regular basis has been explained. Hypertension as a silent killer has been discussed. Risk reduction of the weight and regular exercises to control the hypertension has been explained.    4.  Dyslipidemia: She reports her PCP manages her cholesterol labs.  Continue statin.    Risk of the hyperlipidemia, importance of the treatment has been explained. Pros and cons of the statins has been explained. Regular blood workup as well as side effects including the liver failure, myelopathy death has been explained.           No diagnosis found.    There are no diagnoses linked to this encounter.    COUNSELING: Samir Alegre was given to patient for the following topics: diagnostic results, risk factor reductions, impressions, risks and benefits of treatment options and importance of treatment compliance .         SMOKING COUNSELING: Denies    Follow-up in 6 months or sooner if needed    Sincerely,   JENNIFER Márquez  [facility]  05/03/22  13:13 EDT    EMR Dragon/Transcription disclaimer:   Much of this encounter note is an electronic transcription/translation of spoken language to printed text. The electronic translation of spoken language may permit erroneous, or at times, nonsensical words or phrases to be inadvertently transcribed; Although I have reviewed the note for such errors, some may still exist.

## 2022-06-14 ENCOUNTER — OFFICE VISIT (OUTPATIENT)
Dept: CARDIOLOGY | Facility: CLINIC | Age: 77
End: 2022-06-14

## 2022-06-14 ENCOUNTER — HOSPITAL ENCOUNTER (OUTPATIENT)
Facility: HOSPITAL | Age: 77
Setting detail: OBSERVATION
Discharge: HOME | End: 2022-06-16
Attending: INTERNAL MEDICINE | Admitting: INTERNAL MEDICINE

## 2022-06-14 VITALS
DIASTOLIC BLOOD PRESSURE: 103 MMHG | SYSTOLIC BLOOD PRESSURE: 153 MMHG | HEIGHT: 64 IN | HEART RATE: 140 BPM | BODY MASS INDEX: 35.34 KG/M2 | WEIGHT: 207 LBS

## 2022-06-14 DIAGNOSIS — E78.5 DYSLIPIDEMIA: ICD-10-CM

## 2022-06-14 DIAGNOSIS — I48.91 ATRIAL FIBRILLATION WITH RVR: Primary | ICD-10-CM

## 2022-06-14 DIAGNOSIS — I10 BENIGN ESSENTIAL HYPERTENSION: ICD-10-CM

## 2022-06-14 DIAGNOSIS — I25.10 CORONARY ARTERIOSCLEROSIS IN NATIVE ARTERY: ICD-10-CM

## 2022-06-14 DIAGNOSIS — I48.91 ATRIAL FIBRILLATION WITH RVR: ICD-10-CM

## 2022-06-14 LAB
ALBUMIN SERPL-MCNC: 4.1 G/DL (ref 3.5–5.2)
ALBUMIN/GLOB SERPL: 1.5 G/DL
ALP SERPL-CCNC: 80 U/L (ref 39–117)
ALT SERPL W P-5'-P-CCNC: 17 U/L (ref 1–33)
ANION GAP SERPL CALCULATED.3IONS-SCNC: 13.4 MMOL/L (ref 5–15)
APTT PPP: 33.7 SECONDS (ref 22.7–35.4)
AST SERPL-CCNC: 15 U/L (ref 1–32)
BASOPHILS # BLD AUTO: 0.05 10*3/MM3 (ref 0–0.2)
BASOPHILS NFR BLD AUTO: 0.5 % (ref 0–1.5)
BILIRUB SERPL-MCNC: 0.3 MG/DL (ref 0–1.2)
BUN SERPL-MCNC: 19 MG/DL (ref 8–23)
BUN/CREAT SERPL: 24.1 (ref 7–25)
CALCIUM SPEC-SCNC: 9.6 MG/DL (ref 8.6–10.5)
CHLORIDE SERPL-SCNC: 104 MMOL/L (ref 98–107)
CO2 SERPL-SCNC: 23.6 MMOL/L (ref 22–29)
CREAT SERPL-MCNC: 0.79 MG/DL (ref 0.57–1)
DEPRECATED RDW RBC AUTO: 43 FL (ref 37–54)
EGFRCR SERPLBLD CKD-EPI 2021: 78.1 ML/MIN/1.73
EOSINOPHIL # BLD AUTO: 0.2 10*3/MM3 (ref 0–0.4)
EOSINOPHIL NFR BLD AUTO: 2.1 % (ref 0.3–6.2)
ERYTHROCYTE [DISTWIDTH] IN BLOOD BY AUTOMATED COUNT: 14.5 % (ref 12.3–15.4)
GLOBULIN UR ELPH-MCNC: 2.7 GM/DL
GLUCOSE SERPL-MCNC: 115 MG/DL (ref 65–99)
HCT VFR BLD AUTO: 40.4 % (ref 34–46.6)
HGB BLD-MCNC: 13.3 G/DL (ref 12–15.9)
IMM GRANULOCYTES # BLD AUTO: 0.04 10*3/MM3 (ref 0–0.05)
IMM GRANULOCYTES NFR BLD AUTO: 0.4 % (ref 0–0.5)
INR PPP: 1.09 (ref 0.9–1.1)
LYMPHOCYTES # BLD AUTO: 2.13 10*3/MM3 (ref 0.7–3.1)
LYMPHOCYTES NFR BLD AUTO: 22.2 % (ref 19.6–45.3)
MAGNESIUM SERPL-MCNC: 1.9 MG/DL (ref 1.6–2.4)
MCH RBC QN AUTO: 27.5 PG (ref 26.6–33)
MCHC RBC AUTO-ENTMCNC: 32.9 G/DL (ref 31.5–35.7)
MCV RBC AUTO: 83.6 FL (ref 79–97)
MONOCYTES # BLD AUTO: 0.85 10*3/MM3 (ref 0.1–0.9)
MONOCYTES NFR BLD AUTO: 8.8 % (ref 5–12)
NEUTROPHILS NFR BLD AUTO: 6.34 10*3/MM3 (ref 1.7–7)
NEUTROPHILS NFR BLD AUTO: 66 % (ref 42.7–76)
NRBC BLD AUTO-RTO: 0 /100 WBC (ref 0–0.2)
NT-PROBNP SERPL-MCNC: 2611 PG/ML (ref 0–1800)
PLATELET # BLD AUTO: 283 10*3/MM3 (ref 140–450)
PMV BLD AUTO: 11.6 FL (ref 6–12)
POTASSIUM SERPL-SCNC: 3.9 MMOL/L (ref 3.5–5.2)
PROT SERPL-MCNC: 6.8 G/DL (ref 6–8.5)
PROTHROMBIN TIME: 14 SECONDS (ref 11.7–14.2)
RBC # BLD AUTO: 4.83 10*6/MM3 (ref 3.77–5.28)
SARS-COV-2 RNA PNL SPEC NAA+PROBE: NOT DETECTED
SODIUM SERPL-SCNC: 141 MMOL/L (ref 136–145)
TROPONIN T SERPL-MCNC: <0.01 NG/ML (ref 0–0.03)
TSH SERPL DL<=0.05 MIU/L-ACNC: 0.87 UIU/ML (ref 0.27–4.2)
WBC NRBC COR # BLD: 9.61 10*3/MM3 (ref 3.4–10.8)

## 2022-06-14 PROCEDURE — 99214 OFFICE O/P EST MOD 30 MIN: CPT

## 2022-06-14 PROCEDURE — 93010 ELECTROCARDIOGRAM REPORT: CPT | Performed by: INTERNAL MEDICINE

## 2022-06-14 PROCEDURE — 96365 THER/PROPH/DIAG IV INF INIT: CPT

## 2022-06-14 PROCEDURE — 83735 ASSAY OF MAGNESIUM: CPT

## 2022-06-14 PROCEDURE — 99219 PR INITIAL OBSERVATION CARE/DAY 50 MINUTES: CPT | Performed by: INTERNAL MEDICINE

## 2022-06-14 PROCEDURE — 87635 SARS-COV-2 COVID-19 AMP PRB: CPT

## 2022-06-14 PROCEDURE — 83880 ASSAY OF NATRIURETIC PEPTIDE: CPT

## 2022-06-14 PROCEDURE — 96376 TX/PRO/DX INJ SAME DRUG ADON: CPT

## 2022-06-14 PROCEDURE — 25010000002 AMIODARONE IN DEXTROSE 5% 150-4.21 MG/100ML-% SOLUTION

## 2022-06-14 PROCEDURE — 25010000002 AMIODARONE IN DEXTROSE 5% 360-4.14 MG/200ML-% SOLUTION

## 2022-06-14 PROCEDURE — 85730 THROMBOPLASTIN TIME PARTIAL: CPT

## 2022-06-14 PROCEDURE — 93000 ELECTROCARDIOGRAM COMPLETE: CPT

## 2022-06-14 PROCEDURE — 84484 ASSAY OF TROPONIN QUANT: CPT

## 2022-06-14 PROCEDURE — 96366 THER/PROPH/DIAG IV INF ADDON: CPT

## 2022-06-14 PROCEDURE — 84443 ASSAY THYROID STIM HORMONE: CPT

## 2022-06-14 PROCEDURE — G0378 HOSPITAL OBSERVATION PER HR: HCPCS

## 2022-06-14 PROCEDURE — 85025 COMPLETE CBC W/AUTO DIFF WBC: CPT

## 2022-06-14 PROCEDURE — C9803 HOPD COVID-19 SPEC COLLECT: HCPCS

## 2022-06-14 PROCEDURE — G0379 DIRECT REFER HOSPITAL OBSERV: HCPCS

## 2022-06-14 PROCEDURE — 80053 COMPREHEN METABOLIC PANEL: CPT

## 2022-06-14 PROCEDURE — 85610 PROTHROMBIN TIME: CPT

## 2022-06-14 PROCEDURE — 93005 ELECTROCARDIOGRAM TRACING: CPT

## 2022-06-14 RX ORDER — FAMOTIDINE 20 MG/1
20 TABLET, FILM COATED ORAL 2 TIMES DAILY PRN
Status: DISCONTINUED | OUTPATIENT
Start: 2022-06-14 | End: 2022-06-16 | Stop reason: HOSPADM

## 2022-06-14 RX ORDER — NITROGLYCERIN 0.4 MG/1
0.4 TABLET SUBLINGUAL
Status: DISCONTINUED | OUTPATIENT
Start: 2022-06-14 | End: 2022-06-16 | Stop reason: HOSPADM

## 2022-06-14 RX ORDER — SODIUM CHLORIDE 0.9 % (FLUSH) 0.9 %
10 SYRINGE (ML) INJECTION EVERY 12 HOURS SCHEDULED
Status: DISCONTINUED | OUTPATIENT
Start: 2022-06-14 | End: 2022-06-16 | Stop reason: HOSPADM

## 2022-06-14 RX ORDER — MELATONIN
2000 NIGHTLY
Status: DISCONTINUED | OUTPATIENT
Start: 2022-06-14 | End: 2022-06-16 | Stop reason: HOSPADM

## 2022-06-14 RX ORDER — ZOLPIDEM TARTRATE 5 MG/1
5 TABLET ORAL NIGHTLY PRN
Status: DISCONTINUED | OUTPATIENT
Start: 2022-06-14 | End: 2022-06-16 | Stop reason: HOSPADM

## 2022-06-14 RX ORDER — ATORVASTATIN CALCIUM 20 MG/1
10 TABLET, FILM COATED ORAL DAILY
Status: DISCONTINUED | OUTPATIENT
Start: 2022-06-14 | End: 2022-06-16 | Stop reason: HOSPADM

## 2022-06-14 RX ORDER — SODIUM CHLORIDE 0.9 % (FLUSH) 0.9 %
10 SYRINGE (ML) INJECTION AS NEEDED
Status: DISCONTINUED | OUTPATIENT
Start: 2022-06-14 | End: 2022-06-16 | Stop reason: HOSPADM

## 2022-06-14 RX ORDER — ASPIRIN 81 MG/1
81 TABLET, CHEWABLE ORAL NIGHTLY
Status: DISCONTINUED | OUTPATIENT
Start: 2022-06-14 | End: 2022-06-16 | Stop reason: HOSPADM

## 2022-06-14 RX ADMIN — METOPROLOL TARTRATE 25 MG: 25 TABLET, FILM COATED ORAL at 20:58

## 2022-06-14 RX ADMIN — ZOLPIDEM TARTRATE 5 MG: 5 TABLET, FILM COATED ORAL at 21:06

## 2022-06-14 RX ADMIN — ATORVASTATIN CALCIUM 10 MG: 20 TABLET, FILM COATED ORAL at 20:59

## 2022-06-14 RX ADMIN — AMIODARONE HYDROCHLORIDE 0.5 MG/MIN: 1.8 INJECTION, SOLUTION INTRAVENOUS at 23:05

## 2022-06-14 RX ADMIN — AMIODARONE HYDROCHLORIDE 150 MG: 1.5 INJECTION, SOLUTION INTRAVENOUS at 16:52

## 2022-06-14 RX ADMIN — Medication 10 ML: at 21:00

## 2022-06-14 RX ADMIN — AMIODARONE HYDROCHLORIDE 1 MG/MIN: 1.8 INJECTION, SOLUTION INTRAVENOUS at 17:04

## 2022-06-14 RX ADMIN — Medication 2000 UNITS: at 20:59

## 2022-06-14 RX ADMIN — APIXABAN 5 MG: 5 TABLET, FILM COATED ORAL at 20:59

## 2022-06-14 NOTE — H&P
[facility]  History & Physical Note                                                                                    Patient Identification:  Mag Silva:   76 y.o.  female  1945  [id number]            No chief complaint on file.  Afib with RVR    Date of Admission:6/14/22    Admitting Physician: Tony Ayala MD    Reason for Admission:Atrial fibrillation with RVR (HCC) [I48.91], No chief complaint on file.      History of Present Illness:     This is a 76 year old female that is well known to our service. She has paroxysmal atrial fibrillation, CAD, dyslipidemia, hypertension, COPD. She presented to the office today for complaints of afib. She reports she has been in afib since Sunday. She has taken an extra dose of metoprolol at home as instructed without improvement. She reports her heart rate at home 41-160s. ECG in office was afib with RVR. She reports that she has not missed any doses of eliquis. She reports that she has been feeling fatigued, short of breath, nauseated and in generally unwell since Sunday.     Echo 8/31/21 EF 70%, normal LV function. Stress test 8/31/21 myocardial perfusion study with not evidence of ischemia. Holter monitor in October 2021 showed SR with pacs, pvcs, couplets, ns svts.     Cardiac Risk Factors: cad, hypertension, hyperlipidemia    Past Medical History:  Past Medical History:   Diagnosis Date   • Atrial fibrillation (HCC)    • Chest pain    • COPD (chronic obstructive pulmonary disease) (HCC)    • Coronary artery disease    • Crescendo angina (HCC)    • Dyslipidemia    • Hypertension    • PVC (premature ventricular contraction)    • Syncope     at least 3 stable    Past Surgical History:  Past Surgical History:   Procedure Laterality Date   • BACK SURGERY     • CARDIAC CATHETERIZATION     • CORONARY ANGIOPLASTY WITH STENT PLACEMENT      x 2   • HYSTERECTOMY     • NECK SURGERY          Social History:   Social History     Tobacco Use   •  Smoking status: Former Smoker     Packs/day: 1.50     Years: 20.00     Pack years: 30.00     Types: Cigarettes     Quit date: 2004     Years since quittin.3   • Smokeless tobacco: Never Used   Substance Use Topics   • Alcohol use: No        Family History:  Family History   Problem Relation Age of Onset   • Stroke Mother    • Asthma Father    • Cancer Father           Allergies:  Allergies   Allergen Reactions   • Theophyllines Other (See Comments)     Reports is not allergic, was on for many years, stopped taking in 2017 and never had an allergic reaction, she does not know how this got on her chart       Home Meds:  No current facility-administered medications on file prior to encounter.     Current Outpatient Medications on File Prior to Encounter   Medication Sig Dispense Refill   • apixaban (ELIQUIS) 5 MG tablet tablet Take 1 tablet by mouth Every 12 (Twelve) Hours. Indications: Atrial Fibrillation, Atrial Fibrillation 60 tablet 12   • aspirin 81 MG chewable tablet Chew 81 mg Every Night.     • atorvastatin (LIPITOR) 10 MG tablet Take 10 mg by mouth Daily.     • Cholecalciferol (VITAMIN D3) 2000 UNITS capsule Take 2,000 Units by mouth Every Night.     • famotidine (PEPCID) 20 MG tablet Take 20 mg by mouth 2 (Two) Times a Day As Needed.     • metoprolol tartrate (LOPRESSOR) 25 MG tablet Take 1 tablet by mouth Every 12 (Twelve) Hours. 180 tablet 3   • Multiple Vitamins-Minerals (MULTI COMPLETE PO) Take  by mouth.     • olmesartan-hydrochlorothiazide (Benicar HCT) 40-25 MG per tablet Take 1 tablet by mouth Daily. 90 tablet 3   • Omega-3 Fatty Acids (FISH OIL) 1000 MG capsule capsule Take  by mouth Daily With Breakfast.     • tiotropium bromide-olodaterol (STIOLTO RESPIMAT) 2.5-2.5 MCG/ACT aerosol solution inhaler Inhale Daily.     • zolpidem (AMBIEN) 10 MG tablet Take 10 mg by mouth At Night As Needed for sleep.     • Acetaminophen (TYLENOL 8 HOUR ARTHRITIS PAIN PO) Take  by mouth.     • levocetirizine  "(XYZAL) 5 MG tablet Take 5 mg by mouth Daily.     • nitroglycerin (NITROSTAT) 0.4 MG SL tablet 1 under the tongue as needed for angina, may repeat q5mins for up three doses 25 tablet 3         Scheduled Meds:  amiodarone, 150 mg, Once  apixaban, 5 mg, Q12H  aspirin, 81 mg, Nightly  atorvastatin, 10 mg, Daily  losartan-HCTZ 100-25 combo dose, , Daily  metoprolol tartrate, 25 mg, Q12H  sodium chloride, 10 mL, Q12H  Vitamin D3, 2,000 Units, Nightly            INTAKE AND OUTPUT:  No intake or output data in the 24 hours ending 06/14/22 1551        Review of Systems  Constitutional: No wt loss, fever   Gastrointestinal: No nausea , abdominal pain  Behavioral/Psych: No insomnia or anxiety   Cardiovascular ----positive for PALPITATION. All other systems reviewed and are negative             Physical Exam  /79   Pulse 69   Temp 98 °F (36.7 °C) (Oral)   Resp 16   Ht 162.6 cm (64\")   Wt 93.3 kg (205 lb 11.2 oz)   SpO2 94%   BMI 35.31 kg/m²     General appearance: No acute changes   Eyes: Sclerae conjunctivae normal, pupils reactive   HENT: Atraumatic; oropharynx clear with moist mucous membranes and no mucosal ulcerations;  Neck: Trachea midline; NECK, supple, no thyromegaly or lymphadenopathy   Lungs: Normal size and shape, normal breath sounds, equal distribution of air, no rales and rhonchi   CV: S1-S2 regular, no murmurs, no rub, no gallop   Abdomen: Soft, nontender; no masses , no abnormal abdominal sounds   Extremities: No deformity , normal color , no peripheral edema   Skin: Normal temperature, turgor and texture; no rash, ulcers  Psych: Appropriate affect, alert and oriented to person, place and time                                Cardiographics    ECG:     Telemetry:      ECHO:     Echo 8/31/21  Interpretation Summary     · Calculated left ventricular EF = 78.2% Estimated left ventricular EF was in agreement with the calculated left ventricular EF.  · Left ventricular diastolic function was " normal.  · There is no evidence of pericardial effusion. .      Stress test 8/31/21  Interpretation Summary        · Findings consistent with a normal ECG stress test.  · Left ventricular ejection fraction is hyperdynamic (Calculated EF > 70%). .  · Myocardial perfusion imaging indicates a normal myocardial perfusion study with no evidence of ischemia.  · Impressions are consistent with a low risk study.            Carotid Artery duplex 8/2017  Interpretation Summary     · Proximal right internal carotid artery moderate stenosis.  · Proximal left internal carotid artery moderate stenosis.      Cardiac Cath 10/2015  FINAL CONCLUSION:  1.  Normal left main.   2.  A 30% to 40% proximal LAD, 40% diagonal.   3.  A 40% OM intrastent stenosis and distal RCA 99% down to 0% with a 3.0 x 15  Xience dilated to 3.2.   4.  Normal left ventriculogram.      DISCUSSION: Results of angioplasty: Successful angioplasty and stent to the  distal RCA reduced from 99% with thrombus down to 0% with a 3.0 x 15 dilated to  3.2 Xience stent          Interpretation Summary     · Mag Silva monitored for 12d 10h starting on 10/04/2021 The average heart rate, excluding ectopy, was 64 BPM with a minumim of 46 BPM on Day 9 and a maximum of 114 BPM on Day 9. SVE: [place] was 0.68% , max count per 24 hours 635 SVT:126 event, longest event 26 beats on day 5, fastest event 169 bpm on day 5 PVC: burden was 2.96%, max count per 24 hours 2764,1 disparate morphologies VENTRICULAR TACHYCARDIA: 4 events, longest event 6 at Day 8 fastest rate 193 bpm at 8 :  · An abnormal monitor study.  · NSR, FREQUENT PACS, PVCS, COUPLETS, NSSVTS    Lab Review:              @[email or web address]@              CXR:       Assessment / Plan:      Afib with RVR  Coronary artery disease  Hypertension  Hyperlipidemia  Anticoagulated  COPD      Recommendations:    This is a 76 year old female that presented to the office today and was found to be in afib with RVR. She reports her  "symptoms started on Sunday and have been constant since. She has been compliant with her eliquis and denies any missed doses. She will be started on iv amiodarone and plan for CV tomorrow. We will check echo. Troponin, tsh, cbc, cmp, mag, bnp now.       Labs/tests ordered in am: npo after mn, cv in am, surveillance labs.     Anne Lizama, APRN  6/14/2022  15:51 EDT    Patient personally interviewed and above subjective findings personally confirmed during a face to face contact with patient today  All findings of physical examination confirmed  All pertinent and performed labs, cardiac procedures ,  radiographs of the last 24 hours personally reviewed  Impression and plans discussed/elaborated and implemented jointly as described above     Tony Ayala MD          EMR Dragon/Transcription:   \"Dictated utilizing Dragon dictation\".   "

## 2022-06-15 ENCOUNTER — APPOINTMENT (OUTPATIENT)
Dept: CARDIOLOGY | Facility: HOSPITAL | Age: 77
End: 2022-06-15

## 2022-06-15 LAB
ANION GAP SERPL CALCULATED.3IONS-SCNC: 7 MMOL/L (ref 5–15)
BASOPHILS # BLD AUTO: 0.05 10*3/MM3 (ref 0–0.2)
BASOPHILS NFR BLD AUTO: 0.5 % (ref 0–1.5)
BUN SERPL-MCNC: 17 MG/DL (ref 8–23)
BUN/CREAT SERPL: 19.1 (ref 7–25)
CALCIUM SPEC-SCNC: 10.1 MG/DL (ref 8.6–10.5)
CHLORIDE SERPL-SCNC: 103 MMOL/L (ref 98–107)
CHOLEST SERPL-MCNC: 116 MG/DL (ref 0–200)
CO2 SERPL-SCNC: 29 MMOL/L (ref 22–29)
CREAT SERPL-MCNC: 0.89 MG/DL (ref 0.57–1)
DEPRECATED RDW RBC AUTO: 45.2 FL (ref 37–54)
EGFRCR SERPLBLD CKD-EPI 2021: 67.7 ML/MIN/1.73
EOSINOPHIL # BLD AUTO: 0.26 10*3/MM3 (ref 0–0.4)
EOSINOPHIL NFR BLD AUTO: 2.7 % (ref 0.3–6.2)
ERYTHROCYTE [DISTWIDTH] IN BLOOD BY AUTOMATED COUNT: 14.5 % (ref 12.3–15.4)
GLUCOSE SERPL-MCNC: 116 MG/DL (ref 65–99)
HCT VFR BLD AUTO: 41.1 % (ref 34–46.6)
HDLC SERPL-MCNC: 38 MG/DL (ref 40–60)
HGB BLD-MCNC: 13.2 G/DL (ref 12–15.9)
IMM GRANULOCYTES # BLD AUTO: 0.04 10*3/MM3 (ref 0–0.05)
IMM GRANULOCYTES NFR BLD AUTO: 0.4 % (ref 0–0.5)
LDLC SERPL CALC-MCNC: 53 MG/DL (ref 0–100)
LDLC/HDLC SERPL: 1.29 {RATIO}
LYMPHOCYTES # BLD AUTO: 2.63 10*3/MM3 (ref 0.7–3.1)
LYMPHOCYTES NFR BLD AUTO: 27.4 % (ref 19.6–45.3)
MAGNESIUM SERPL-MCNC: 2 MG/DL (ref 1.6–2.4)
MCH RBC QN AUTO: 27.2 PG (ref 26.6–33)
MCHC RBC AUTO-ENTMCNC: 32.1 G/DL (ref 31.5–35.7)
MCV RBC AUTO: 84.7 FL (ref 79–97)
MONOCYTES # BLD AUTO: 0.92 10*3/MM3 (ref 0.1–0.9)
MONOCYTES NFR BLD AUTO: 9.6 % (ref 5–12)
NEUTROPHILS NFR BLD AUTO: 5.69 10*3/MM3 (ref 1.7–7)
NEUTROPHILS NFR BLD AUTO: 59.4 % (ref 42.7–76)
NRBC BLD AUTO-RTO: 0 /100 WBC (ref 0–0.2)
PLATELET # BLD AUTO: 253 10*3/MM3 (ref 140–450)
PMV BLD AUTO: 11.2 FL (ref 6–12)
POTASSIUM SERPL-SCNC: 5.2 MMOL/L (ref 3.5–5.2)
RBC # BLD AUTO: 4.85 10*6/MM3 (ref 3.77–5.28)
SODIUM SERPL-SCNC: 139 MMOL/L (ref 136–145)
TRIGL SERPL-MCNC: 145 MG/DL (ref 0–150)
VLDLC SERPL-MCNC: 25 MG/DL (ref 5–40)
WBC NRBC COR # BLD: 9.59 10*3/MM3 (ref 3.4–10.8)

## 2022-06-15 PROCEDURE — 85025 COMPLETE CBC W/AUTO DIFF WBC: CPT

## 2022-06-15 PROCEDURE — G0378 HOSPITAL OBSERVATION PER HR: HCPCS

## 2022-06-15 PROCEDURE — 96366 THER/PROPH/DIAG IV INF ADDON: CPT

## 2022-06-15 PROCEDURE — 83735 ASSAY OF MAGNESIUM: CPT

## 2022-06-15 PROCEDURE — 93005 ELECTROCARDIOGRAM TRACING: CPT

## 2022-06-15 PROCEDURE — 80061 LIPID PANEL: CPT

## 2022-06-15 PROCEDURE — 93010 ELECTROCARDIOGRAM REPORT: CPT | Performed by: INTERNAL MEDICINE

## 2022-06-15 PROCEDURE — 80048 BASIC METABOLIC PNL TOTAL CA: CPT

## 2022-06-15 PROCEDURE — 92960 CARDIOVERSION ELECTRIC EXT: CPT | Performed by: INTERNAL MEDICINE

## 2022-06-15 PROCEDURE — 25010000002 AMIODARONE IN DEXTROSE 5% 360-4.14 MG/200ML-% SOLUTION

## 2022-06-15 PROCEDURE — 92960 CARDIOVERSION ELECTRIC EXT: CPT

## 2022-06-15 RX ORDER — AMIODARONE HYDROCHLORIDE 200 MG/1
200 TABLET ORAL 2 TIMES DAILY WITH MEALS
Status: DISCONTINUED | OUTPATIENT
Start: 2022-06-15 | End: 2022-06-16 | Stop reason: HOSPADM

## 2022-06-15 RX ORDER — SODIUM CHLORIDE 9 MG/ML
INJECTION, SOLUTION INTRAVENOUS
Status: COMPLETED | OUTPATIENT
Start: 2022-06-15 | End: 2022-06-15

## 2022-06-15 RX ADMIN — APIXABAN 5 MG: 5 TABLET, FILM COATED ORAL at 09:20

## 2022-06-15 RX ADMIN — SODIUM CHLORIDE 50 ML/HR: 9 INJECTION, SOLUTION INTRAVENOUS at 13:35

## 2022-06-15 RX ADMIN — AMIODARONE HYDROCHLORIDE 200 MG: 200 TABLET ORAL at 20:24

## 2022-06-15 RX ADMIN — APIXABAN 5 MG: 5 TABLET, FILM COATED ORAL at 20:24

## 2022-06-15 RX ADMIN — METOPROLOL TARTRATE 25 MG: 25 TABLET, FILM COATED ORAL at 09:20

## 2022-06-15 RX ADMIN — Medication 10 ML: at 20:25

## 2022-06-15 RX ADMIN — LOSARTAN POTASSIUM: 50 TABLET, FILM COATED ORAL at 09:20

## 2022-06-15 RX ADMIN — ASPIRIN 81 MG: 81 TABLET, CHEWABLE ORAL at 20:24

## 2022-06-15 RX ADMIN — METOPROLOL TARTRATE 25 MG: 25 TABLET, FILM COATED ORAL at 20:23

## 2022-06-15 RX ADMIN — ATORVASTATIN CALCIUM 10 MG: 20 TABLET, FILM COATED ORAL at 20:24

## 2022-06-15 RX ADMIN — ZOLPIDEM TARTRATE 5 MG: 5 TABLET, FILM COATED ORAL at 20:25

## 2022-06-15 RX ADMIN — AMIODARONE HYDROCHLORIDE 0.5 MG/MIN: 1.8 INJECTION, SOLUTION INTRAVENOUS at 11:15

## 2022-06-15 RX ADMIN — Medication 40 MG: at 13:39

## 2022-06-15 RX ADMIN — Medication 2000 UNITS: at 20:24

## 2022-06-15 NOTE — PLAN OF CARE
Goal Outcome Evaluation:   CV done this afternoon and pt has been NSR since; VSS. No c/o voiced.

## 2022-06-16 VITALS
DIASTOLIC BLOOD PRESSURE: 56 MMHG | HEIGHT: 64 IN | WEIGHT: 205.7 LBS | OXYGEN SATURATION: 92 % | SYSTOLIC BLOOD PRESSURE: 130 MMHG | BODY MASS INDEX: 35.12 KG/M2 | HEART RATE: 73 BPM | TEMPERATURE: 98.5 F | RESPIRATION RATE: 18 BRPM

## 2022-06-16 LAB
ANION GAP SERPL CALCULATED.3IONS-SCNC: 11 MMOL/L (ref 5–15)
BASOPHILS # BLD AUTO: 0.04 10*3/MM3 (ref 0–0.2)
BASOPHILS NFR BLD AUTO: 0.3 % (ref 0–1.5)
BUN SERPL-MCNC: 23 MG/DL (ref 8–23)
BUN/CREAT SERPL: 23.2 (ref 7–25)
CALCIUM SPEC-SCNC: 9.4 MG/DL (ref 8.6–10.5)
CHLORIDE SERPL-SCNC: 103 MMOL/L (ref 98–107)
CO2 SERPL-SCNC: 26 MMOL/L (ref 22–29)
CREAT SERPL-MCNC: 0.99 MG/DL (ref 0.57–1)
DEPRECATED RDW RBC AUTO: 44.6 FL (ref 37–54)
EGFRCR SERPLBLD CKD-EPI 2021: 59.6 ML/MIN/1.73
EOSINOPHIL # BLD AUTO: 0.25 10*3/MM3 (ref 0–0.4)
EOSINOPHIL NFR BLD AUTO: 2 % (ref 0.3–6.2)
ERYTHROCYTE [DISTWIDTH] IN BLOOD BY AUTOMATED COUNT: 14.7 % (ref 12.3–15.4)
GLUCOSE SERPL-MCNC: 110 MG/DL (ref 65–99)
HCT VFR BLD AUTO: 38.3 % (ref 34–46.6)
HGB BLD-MCNC: 12.3 G/DL (ref 12–15.9)
IMM GRANULOCYTES # BLD AUTO: 0.05 10*3/MM3 (ref 0–0.05)
IMM GRANULOCYTES NFR BLD AUTO: 0.4 % (ref 0–0.5)
LYMPHOCYTES # BLD AUTO: 2.18 10*3/MM3 (ref 0.7–3.1)
LYMPHOCYTES NFR BLD AUTO: 17.8 % (ref 19.6–45.3)
MAXIMAL PREDICTED HEART RATE: 145 BPM
MCH RBC QN AUTO: 27.2 PG (ref 26.6–33)
MCHC RBC AUTO-ENTMCNC: 32.1 G/DL (ref 31.5–35.7)
MCV RBC AUTO: 84.7 FL (ref 79–97)
MONOCYTES # BLD AUTO: 1.08 10*3/MM3 (ref 0.1–0.9)
MONOCYTES NFR BLD AUTO: 8.8 % (ref 5–12)
NEUTROPHILS NFR BLD AUTO: 70.7 % (ref 42.7–76)
NEUTROPHILS NFR BLD AUTO: 8.62 10*3/MM3 (ref 1.7–7)
NRBC BLD AUTO-RTO: 0 /100 WBC (ref 0–0.2)
PLATELET # BLD AUTO: 231 10*3/MM3 (ref 140–450)
PMV BLD AUTO: 11 FL (ref 6–12)
POTASSIUM SERPL-SCNC: 5 MMOL/L (ref 3.5–5.2)
RBC # BLD AUTO: 4.52 10*6/MM3 (ref 3.77–5.28)
SODIUM SERPL-SCNC: 140 MMOL/L (ref 136–145)
STRESS TARGET HR: 123 BPM
WBC NRBC COR # BLD: 12.22 10*3/MM3 (ref 3.4–10.8)

## 2022-06-16 PROCEDURE — G0378 HOSPITAL OBSERVATION PER HR: HCPCS

## 2022-06-16 PROCEDURE — 85025 COMPLETE CBC W/AUTO DIFF WBC: CPT

## 2022-06-16 PROCEDURE — 93005 ELECTROCARDIOGRAM TRACING: CPT

## 2022-06-16 PROCEDURE — 93010 ELECTROCARDIOGRAM REPORT: CPT | Performed by: INTERNAL MEDICINE

## 2022-06-16 PROCEDURE — 80048 BASIC METABOLIC PNL TOTAL CA: CPT

## 2022-06-16 PROCEDURE — 99217 PR OBSERVATION CARE DISCHARGE MANAGEMENT: CPT

## 2022-06-16 RX ORDER — AMIODARONE HYDROCHLORIDE 200 MG/1
200 TABLET ORAL 2 TIMES DAILY WITH MEALS
Qty: 60 TABLET | Refills: 11 | Status: SHIPPED | OUTPATIENT
Start: 2022-06-16 | End: 2022-07-13 | Stop reason: SDUPTHER

## 2022-06-16 RX ADMIN — APIXABAN 5 MG: 5 TABLET, FILM COATED ORAL at 08:13

## 2022-06-16 RX ADMIN — LOSARTAN POTASSIUM: 50 TABLET, FILM COATED ORAL at 08:14

## 2022-06-16 RX ADMIN — Medication 10 ML: at 08:14

## 2022-06-16 RX ADMIN — AMIODARONE HYDROCHLORIDE 200 MG: 200 TABLET ORAL at 08:14

## 2022-06-16 RX ADMIN — ATORVASTATIN CALCIUM 10 MG: 20 TABLET, FILM COATED ORAL at 08:14

## 2022-06-16 RX ADMIN — METOPROLOL TARTRATE 25 MG: 25 TABLET, FILM COATED ORAL at 08:13

## 2022-06-16 NOTE — DISCHARGE SUMMARY
[facility]  Physician Discharge Summary    Patient Identification:  Name: Mag Silva  Age: 76 y.o.  Sex: female  :  1945  MRN: [id number]    Admit date: 2022    Discharge date and time:  2022      Admitting Physician: Tony Ayala MD     Discharge Physician: Anne Lizama APRN    Discharge Diagnoses:   Afib with RVR  CAD  Hypertension  Patient Active Problem List   Diagnosis   • Coronary arteriosclerosis in native artery   • Benign essential hypertension   • Chronic coronary artery disease   • Chest pain   • Chronic obstructive pulmonary disease (HCC)   • Dyslipidemia   • Ventricular premature beats   • Syncope   • Hyperlipidemia   • Overweight   • New onset atrial fibrillation (HCC)   • Anticoagulated   • Atrial fibrillation with RVR (HCC)       Discharged Condition: stable    Hospital Course:     This is a 76-year-old female who is well-known to our service.  She was admitted for atrial fibrillation with RVR.  She was started on IV amiodarone with successful cardioversion.  She recovered from cardioversion without complication.  She was maintained on anticoagulation throughout her hospital visit.  She was transitioned to p.o. amiodarone without complication.  She will remain on amiodarone 200 mg twice daily until follow-up with her office.  ECG on day of discharge sinus rhythm with a QTC of 462.  Vital signs remained stable.  She is cardiovascular stable for discharge with outpatient follow-up.    Consults:   None    Discharge Exam:  General: No acute distress   Skin: Warm and dry, no diaphoresis noted   EYES: PERTL   HEENT: external ear and nose normal; oral mucosa moist   Neck: Supple; no carotid bruits; no JVD   Heart: S1S2 regular rate and rhythm; no murmurs; no gallop or rub appreciated   Pulmonary: Respirations regular, unlabored at rest, bilateral breath sounds have good air entry throughout all lung fields; no crackles, rubs or wheezes auscultated.     GI:  Soft, non-tender, non-distended, positive bowel sounds  No hepatosplenomegaly   Extremities: Bilateral lower extremities have no pre-tibial pitting edema; DP/PT pulses are palpable   Neurological: Alert and oriented x 3; no neuro deficits          LABS:  Results from last 7 days   Lab Units 06/14/22  1646   TROPONIN T ng/mL <0.010     Results from last 7 days   Lab Units 06/15/22  0302   MAGNESIUM mg/dL 2.0     Results from last 7 days   Lab Units 06/16/22  0307   SODIUM mmol/L 140   POTASSIUM mmol/L 5.0   BUN mg/dL 23   CREATININE mg/dL 0.99   CALCIUM mg/dL 9.4     @LABRCNTbnp@  Results from last 7 days   Lab Units 06/16/22  0307 06/15/22  0302 06/14/22  1646   WBC 10*3/mm3 12.22* 9.59 9.61   HEMOGLOBIN g/dL 12.3 13.2 13.3   HEMATOCRIT % 38.3 41.1 40.4   PLATELETS 10*3/mm3 231 253 283     Results from last 7 days   Lab Units 06/14/22  1646   INR  1.09   APTT seconds 33.7     Results from last 7 days   Lab Units 06/15/22  0302   CHOLESTEROL mg/dL 116   TRIGLYCERIDES mg/dL 145   HDL CHOL mg/dL 38*   LDL CHOL mg/dL 53     Disposition:  Home    Discharge Medications:      Discharge Medications      New Medications      Instructions Start Date   amiodarone 200 MG tablet  Commonly known as: PACERONE   200 mg, Oral, 2 Times Daily With Meals         Continue These Medications      Instructions Start Date   apixaban 5 MG tablet tablet  Commonly known as: ELIQUIS   5 mg, Oral, Every 12 Hours Scheduled      aspirin 81 MG chewable tablet   81 mg, Oral, Nightly      atorvastatin 10 MG tablet  Commonly known as: LIPITOR   10 mg, Oral, Daily      famotidine 20 MG tablet  Commonly known as: PEPCID   20 mg, Oral, 2 Times Daily PRN      fish oil 1000 MG capsule capsule   Oral, Daily With Breakfast      levocetirizine 5 MG tablet  Commonly known as: XYZAL   5 mg, Oral, Daily      metoprolol tartrate 25 MG tablet  Commonly known as: LOPRESSOR   25 mg, Oral, Every 12 Hours Scheduled      MULTI COMPLETE PO   Oral      nitroglycerin 0.4  "MG SL tablet  Commonly known as: NITROSTAT   1 under the tongue as needed for angina, may repeat q5mins for up three doses      olmesartan-hydrochlorothiazide 40-25 MG per tablet  Commonly known as: Benicar HCT   1 tablet, Oral, Daily      tiotropium bromide-olodaterol 2.5-2.5 MCG/ACT aerosol solution inhaler  Commonly known as: STIOLTO RESPIMAT   Inhalation, Daily - RT      TYLENOL 8 HOUR ARTHRITIS PAIN PO   Oral      Vitamin D3 50 MCG (2000 UT) capsule   2,000 Units, Oral, Nightly      zolpidem 10 MG tablet  Commonly known as: AMBIEN   10 mg, Oral, Nightly PRN               Discharge Home Instructions:  1. Follow up with your pcp in 1 week  2. Follow up with Dr Ayala on July 11 at 12:30      Signed:  JENNIFER Márquez  6/16/2022  09:05 EDT      EMR Dragon/Transcription:   \"Dictated utilizing Dragon dictation\".     "

## 2022-06-16 NOTE — PROGRESS NOTES
[facility] Clinical Pharmacy Services: Patient Counseling Consult    Mag Simant has been counseled on the following medications: apixaban and amiodarone. Counseling points included the following:    Explained indication of each medication, and patient's need for these medications.  Went over dosing and frequency of each medication.  Discussed any special administration, storage, or monitoring instructions with medications.  Discussed all important drug interactions, including over-the-counter medications and supplements.  Explained possible side effects for each medication.  Instructed the patient not to begin or discontinue any medications without informing his/her physician/pharmacist.    Patient expressed understanding and had no further questions.      Lynnette Crowley, Pharm.D., [facility]   Clinical Pharmacist  Phone Extension #[phone]

## 2022-06-21 LAB
QT INTERVAL: 304 MS
QT INTERVAL: 422 MS
QT INTERVAL: 443 MS

## 2022-07-11 ENCOUNTER — OFFICE VISIT (OUTPATIENT)
Dept: CARDIOLOGY | Facility: CLINIC | Age: 77
End: 2022-07-11

## 2022-07-11 VITALS
HEIGHT: 64 IN | BODY MASS INDEX: 36.02 KG/M2 | WEIGHT: 211 LBS | SYSTOLIC BLOOD PRESSURE: 133 MMHG | HEART RATE: 56 BPM | DIASTOLIC BLOOD PRESSURE: 85 MMHG

## 2022-07-11 DIAGNOSIS — I48.0 PAROXYSMAL ATRIAL FIBRILLATION: ICD-10-CM

## 2022-07-11 DIAGNOSIS — E78.00 PURE HYPERCHOLESTEROLEMIA: ICD-10-CM

## 2022-07-11 DIAGNOSIS — I10 BENIGN ESSENTIAL HYPERTENSION: ICD-10-CM

## 2022-07-11 DIAGNOSIS — Z79.01 ANTICOAGULATED: ICD-10-CM

## 2022-07-11 DIAGNOSIS — I25.10 CORONARY ARTERIOSCLEROSIS IN NATIVE ARTERY: Primary | ICD-10-CM

## 2022-07-11 PROCEDURE — 93000 ELECTROCARDIOGRAM COMPLETE: CPT | Performed by: INTERNAL MEDICINE

## 2022-07-11 PROCEDURE — 99214 OFFICE O/P EST MOD 30 MIN: CPT | Performed by: INTERNAL MEDICINE

## 2022-07-11 NOTE — PROGRESS NOTES
[facility] cardioversion follow up    Subjective:        Mag Silva is a 76 y.o. female who here for follow up    CC  Atrial fibrillation anticoagulation  HPI  76-year-old female with coronary artery disease, benign essential arterial hypertension hyperlipidemia paroxysmal atrial fibrillation here for the follow-up with no complaints of chest pains tightness heaviness or the pressure sensation     Problems Addressed this Visit        Cardiac and Vasculature    Coronary arteriosclerosis in native artery - Primary    Benign essential hypertension    Hyperlipidemia    Paroxysmal atrial fibrillation (HCC)       Coag and Thromboembolic    Anticoagulated      Diagnoses       Codes Comments    Coronary arteriosclerosis in native artery    -  Primary ICD-10-CM: I25.10  ICD-9-CM: 414.01     Benign essential hypertension     ICD-10-CM: I10  ICD-9-CM: 401.1     Pure hypercholesterolemia     ICD-10-CM: E78.00  ICD-9-CM: 272.0     Anticoagulated     ICD-10-CM: Z79.01  ICD-9-CM: V58.61     Paroxysmal atrial fibrillation (HCC)     ICD-10-CM: I48.0  ICD-9-CM: 427.31         .    The following portions of the patient's history were reviewed and updated as appropriate: allergies, current medications, past family history, past medical history, past social history, past surgical history and problem list.    Past Medical History:   Diagnosis Date   • Atrial fibrillation (HCC)    • Chest pain    • COPD (chronic obstructive pulmonary disease) (HCC)    • Coronary artery disease    • Crescendo angina (HCC)    • Dyslipidemia    • Hypertension    • PVC (premature ventricular contraction)    • Syncope      reports that she quit smoking about 18 years ago. Her smoking use included cigarettes. She has a 30.00 pack-year smoking history. She has never used smokeless tobacco. She reports that she does not drink alcohol and does not use drugs.   Family History   Problem Relation Age of Onset   • Stroke Mother    • Asthma Father    • Cancer Father  "       Review of Systems  Constitutional: No wt loss, fever, fatigue  Gastrointestinal: No nausea, abdominal pain  Behavioral/Psych: No insomnia or anxiety   Cardiovascular no chest pains or tightness in the chest  Objective:       Physical Exam  /85   Pulse 56   Ht 162.6 cm (64\")   Wt 95.7 kg (211 lb)   BMI 36.22 kg/m²   General appearance: No acute changes   Neck: Trachea midline; NECK, supple, no thyromegaly or lymphadenopathy   Lungs: Normal size and shape, normal breath sounds, equal distribution of air, no rales and rhonchi   CV: S1-S2 regular, no murmurs, no rub, no gallop   Abdomen: Soft, nontender; no masses , no abnormal abdominal sounds   Extremities: No deformity , normal color , no peripheral edema   Skin: Normal temperature, turgor and texture; no rash, ulcers            ECG 12 Lead    Date/Time: 7/11/2022 1:19 PM  Performed by: Tony Ayala MD  Authorized by: Tony Ayala MD   Comparison: compared with previous ECG   Similar to previous ECG  Rhythm: sinus rhythm    Clinical impression: non-specific ECG              Echocardiogram:        Current Outpatient Medications:   •  Acetaminophen (TYLENOL 8 HOUR ARTHRITIS PAIN PO), Take  by mouth., Disp: , Rfl:   •  amiodarone (PACERONE) 200 MG tablet, Take 1 tablet by mouth 2 (Two) Times a Day With Meals., Disp: 60 tablet, Rfl: 11  •  apixaban (ELIQUIS) 5 MG tablet tablet, Take 1 tablet by mouth Every 12 (Twelve) Hours. Indications: Atrial Fibrillation, Atrial Fibrillation, Disp: 60 tablet, Rfl: 12  •  aspirin 81 MG chewable tablet, Chew 81 mg Every Night., Disp: , Rfl:   •  atorvastatin (LIPITOR) 10 MG tablet, Take 10 mg by mouth Daily., Disp: , Rfl:   •  Cholecalciferol (VITAMIN D3) 2000 UNITS capsule, Take 2,000 Units by mouth Every Night., Disp: , Rfl:   •  famotidine (PEPCID) 20 MG tablet, Take 20 mg by mouth 2 (Two) Times a Day As Needed., Disp: , Rfl:   •  levocetirizine (XYZAL) 5 MG tablet, Take 5 mg by mouth Daily., " Disp: , Rfl:   •  metoprolol tartrate (LOPRESSOR) 25 MG tablet, Take 1 tablet by mouth Every 12 (Twelve) Hours., Disp: 180 tablet, Rfl: 3  •  Multiple Vitamins-Minerals (MULTI COMPLETE PO), Take  by mouth., Disp: , Rfl:   •  nitroglycerin (NITROSTAT) 0.4 MG SL tablet, 1 under the tongue as needed for angina, may repeat q5mins for up three doses, Disp: 25 tablet, Rfl: 3  •  olmesartan-hydrochlorothiazide (Benicar HCT) 40-25 MG per tablet, Take 1 tablet by mouth Daily., Disp: 90 tablet, Rfl: 3  •  Omega-3 Fatty Acids (FISH OIL) 1000 MG capsule capsule, Take  by mouth Daily With Breakfast., Disp: , Rfl:   •  tiotropium bromide-olodaterol (STIOLTO RESPIMAT) 2.5-2.5 MCG/ACT aerosol solution inhaler, Inhale Daily., Disp: , Rfl:   •  zolpidem (AMBIEN) 10 MG tablet, Take 10 mg by mouth At Night As Needed for sleep., Disp: , Rfl:    Assessment:        Patient Active Problem List   Diagnosis   • Coronary arteriosclerosis in native artery   • Benign essential hypertension   • Chronic coronary artery disease   • Chest pain   • Chronic obstructive pulmonary disease (HCC)   • Dyslipidemia   • Ventricular premature beats   • Syncope   • Hyperlipidemia   • Overweight   • New onset atrial fibrillation (HCC)   • Anticoagulated   • Atrial fibrillation with RVR (HCC)               Plan:            ICD-10-CM ICD-9-CM   1. Coronary arteriosclerosis in native artery  I25.10 414.01   2. Benign essential hypertension  I10 401.1   3. Pure hypercholesterolemia  E78.00 272.0   4. Anticoagulated  Z79.01 V58.61   5. Paroxysmal atrial fibrillation (HCC)  I48.0 427.31     1. Coronary arteriosclerosis in native artery  No angina pectoris    2. Benign essential hypertension  Pressure controlled    3. Pure hypercholesterolemia  Continue current treatment    4. Anticoagulated  Continue current treatment    5. Paroxysmal atrial fibrillation (HCC)  Stable       REDUCE AMIO 200 MG PO DAILY    SEE IN 6 MONTHS    AMIODARONE PT WANTS TO BE CHECKED BY PCP  PER HER CHOICE  COUNSELING:    Mag Alegre was given to patient for the following topics: diagnostic results, risk factor reductions, impressions, risks and benefits of treatment options and importance of treatment compliance .       SMOKING COUNSELING:    [unfilled]    Dictated using Dragon dictation

## 2022-07-13 RX ORDER — AMIODARONE HYDROCHLORIDE 200 MG/1
200 TABLET ORAL 2 TIMES DAILY WITH MEALS
Qty: 180 TABLET | Refills: 1 | Status: SHIPPED | OUTPATIENT
Start: 2022-07-13 | End: 2023-01-16

## 2022-07-25 PROBLEM — I48.0 PAROXYSMAL ATRIAL FIBRILLATION: Status: ACTIVE | Noted: 2021-08-02

## 2023-01-16 ENCOUNTER — OFFICE VISIT (OUTPATIENT)
Dept: CARDIOLOGY | Facility: CLINIC | Age: 78
End: 2023-01-16
Payer: MEDICARE

## 2023-01-16 VITALS
DIASTOLIC BLOOD PRESSURE: 80 MMHG | HEIGHT: 64 IN | WEIGHT: 210 LBS | SYSTOLIC BLOOD PRESSURE: 142 MMHG | BODY MASS INDEX: 35.85 KG/M2 | HEART RATE: 49 BPM

## 2023-01-16 DIAGNOSIS — I10 BENIGN ESSENTIAL HYPERTENSION: ICD-10-CM

## 2023-01-16 DIAGNOSIS — Z79.01 ANTICOAGULATED: ICD-10-CM

## 2023-01-16 DIAGNOSIS — I48.0 PAROXYSMAL ATRIAL FIBRILLATION: ICD-10-CM

## 2023-01-16 DIAGNOSIS — I25.10 CORONARY ARTERIOSCLEROSIS IN NATIVE ARTERY: Primary | ICD-10-CM

## 2023-01-16 DIAGNOSIS — E78.5 HYPERLIPIDEMIA, UNSPECIFIED HYPERLIPIDEMIA TYPE: ICD-10-CM

## 2023-01-16 PROCEDURE — 99214 OFFICE O/P EST MOD 30 MIN: CPT | Performed by: INTERNAL MEDICINE

## 2023-01-16 PROCEDURE — 93000 ELECTROCARDIOGRAM COMPLETE: CPT | Performed by: INTERNAL MEDICINE

## 2023-01-16 RX ORDER — AMIODARONE HYDROCHLORIDE 200 MG/1
100 TABLET ORAL DAILY
Qty: 180 TABLET | Refills: 1 | Status: SHIPPED | OUTPATIENT
Start: 2023-01-16

## 2023-01-16 NOTE — PROGRESS NOTES
6 month follow up    Subjective:        Mag Silva is a 77 y.o. female who here for follow up    CC  Bruising  Hand shaking  HPI  77 years old female with coronary artery disease hypertension paroxysmal atrial fibrillation anticoagulation here for the follow-up has been complaining of the bruising in the handshaking     Problems Addressed this Visit        Cardiac and Vasculature    Coronary arteriosclerosis in native artery - Primary    Benign essential hypertension    Hyperlipidemia    Paroxysmal atrial fibrillation (HCC)       Coag and Thromboembolic    Anticoagulated   Diagnoses       Codes Comments    Coronary arteriosclerosis in native artery    -  Primary ICD-10-CM: I25.10  ICD-9-CM: 414.01     Benign essential hypertension     ICD-10-CM: I10  ICD-9-CM: 401.1     Anticoagulated     ICD-10-CM: Z79.01  ICD-9-CM: V58.61     Paroxysmal atrial fibrillation (HCC)     ICD-10-CM: I48.0  ICD-9-CM: 427.31     Hyperlipidemia, unspecified hyperlipidemia type     ICD-10-CM: E78.5  ICD-9-CM: 272.4         .    The following portions of the patient's history were reviewed and updated as appropriate: allergies, current medications, past family history, past medical history, past social history, past surgical history and problem list.    Past Medical History:   Diagnosis Date   • Atrial fibrillation (HCC)    • Chest pain    • COPD (chronic obstructive pulmonary disease) (HCC)    • Coronary artery disease    • Crescendo angina (HCC)    • Dyslipidemia    • Hypertension    • PVC (premature ventricular contraction)    • Syncope      reports that she quit smoking about 19 years ago. Her smoking use included cigarettes. She has a 30.00 pack-year smoking history. She has never used smokeless tobacco. She reports that she does not drink alcohol and does not use drugs.   Family History   Problem Relation Age of Onset   • Stroke Mother    • Asthma Father    • Cancer Father        Review of Systems  Constitutional: No wt loss,  "fever, fatigue  Gastrointestinal: No nausea, abdominal pain  Behavioral/Psych: No insomnia or anxiety   Cardiovascular no chest pains or shortness of breath  Objective:       Physical Exam  /80   Pulse (!) 49   Ht 162.6 cm (64\")   Wt 95.3 kg (210 lb)   BMI 36.05 kg/m²   General appearance: No acute changes   Neck: Trachea midline; NECK, supple, no thyromegaly or lymphadenopathy   Lungs: Normal size and shape, normal breath sounds, equal distribution of air, no rales and rhonchi   CV: S1-S2 regular, no murmurs, no rub, no gallop   Abdomen: Soft, nontender; no masses , no abnormal abdominal sounds   Extremities: No deformity , normal color , no peripheral edema   Skin: Normal temperature, turgor and texture; no rash, ulcers            ECG 12 Lead    Date/Time: 1/16/2023 10:58 AM  Performed by: Tony Ayala MD  Authorized by: Tony Ayala MD   Comparison: compared with previous ECG   Similar to previous ECG  Rhythm: sinus rhythm  ST Flattening: all    Clinical impression: non-specific ECG              Echocardiogram:        Current Outpatient Medications:   •  Acetaminophen (TYLENOL 8 HOUR ARTHRITIS PAIN PO), Take  by mouth., Disp: , Rfl:   •  amiodarone (PACERONE) 200 MG tablet, Take 0.5 tablets by mouth Daily., Disp: 180 tablet, Rfl: 1  •  apixaban (ELIQUIS) 2.5 MG tablet tablet, Take 2 tablets by mouth Every 12 (Twelve) Hours. Indications: Atrial Fibrillation, Atrial Fibrillation, Disp: 60 tablet, Rfl: 6  •  aspirin 81 MG chewable tablet, Chew 81 mg Every Night., Disp: , Rfl:   •  atorvastatin (LIPITOR) 10 MG tablet, Take 10 mg by mouth Daily., Disp: , Rfl:   •  Cholecalciferol (VITAMIN D3) 2000 UNITS capsule, Take 2,000 Units by mouth Every Night., Disp: , Rfl:   •  famotidine (PEPCID) 20 MG tablet, Take 20 mg by mouth 2 (Two) Times a Day As Needed., Disp: , Rfl:   •  levocetirizine (XYZAL) 5 MG tablet, Take 5 mg by mouth Daily., Disp: , Rfl:   •  metoprolol tartrate (LOPRESSOR) 25 MG " tablet, Take 1 tablet by mouth Every 12 (Twelve) Hours., Disp: 180 tablet, Rfl: 3  •  Multiple Vitamins-Minerals (MULTI COMPLETE PO), Take  by mouth., Disp: , Rfl:   •  nitroglycerin (NITROSTAT) 0.4 MG SL tablet, 1 under the tongue as needed for angina, may repeat q5mins for up three doses, Disp: 25 tablet, Rfl: 3  •  olmesartan-hydrochlorothiazide (Benicar HCT) 40-25 MG per tablet, Take 1 tablet by mouth Daily., Disp: 90 tablet, Rfl: 3  •  Omega-3 Fatty Acids (FISH OIL) 1000 MG capsule capsule, Take  by mouth Daily With Breakfast., Disp: , Rfl:   •  tiotropium bromide-olodaterol (STIOLTO RESPIMAT) 2.5-2.5 MCG/ACT aerosol solution inhaler, Inhale Daily., Disp: , Rfl:   •  zolpidem (AMBIEN) 10 MG tablet, Take 10 mg by mouth At Night As Needed for sleep., Disp: , Rfl:    Assessment:        Patient Active Problem List   Diagnosis   • Coronary arteriosclerosis in native artery   • Benign essential hypertension   • Chronic coronary artery disease   • Chest pain   • Chronic obstructive pulmonary disease (HCC)   • Dyslipidemia   • Ventricular premature beats   • Syncope   • Hyperlipidemia   • Overweight   • Paroxysmal atrial fibrillation (HCC)   • Anticoagulated   • Atrial fibrillation with RVR (HCC)               Plan:            ICD-10-CM ICD-9-CM   1. Coronary arteriosclerosis in native artery  I25.10 414.01   2. Benign essential hypertension  I10 401.1   3. Anticoagulated  Z79.01 V58.61   4. Paroxysmal atrial fibrillation (HCC)  I48.0 427.31   5. Hyperlipidemia, unspecified hyperlipidemia type  E78.5 272.4     1. Coronary arteriosclerosis in native artery  No angina pectoris    2. Benign essential hypertension  Blood pressure under control    3. Anticoagulated  Continue current treatment    4. Paroxysmal atrial fibrillation (HCC)  Under control    5. Hyperlipidemia, unspecified hyperlipidemia type  Continue current treatment     Reduce amio 100   Pt wants to reduce elloquise to 2.5 mg    Pros and cons of this new  medication / change medication has been explained to  the patient    Possible side effects has been explained    Associated need of the blood  Work has been explained    Need for the compliance of the medication has been explained    Pt understands risks  COUNSELING:    Mag Alegre was given to patient for the following topics: diagnostic results, risk factor reductions, impressions, risks and benefits of treatment options and importance of treatment compliance .       SMOKING COUNSELING:        Dictated using Dragon dictation

## 2023-03-17 ENCOUNTER — TELEPHONE (OUTPATIENT)
Dept: CARDIOLOGY | Facility: CLINIC | Age: 78
End: 2023-03-17
Payer: MEDICARE

## 2023-03-17 NOTE — TELEPHONE ENCOUNTER
Caller: Mag Silva    Relationship: Self    Best call back number: [phone]    What is the best time to reach you: ANYTIME    Who are you requesting to speak with (clinical staff, provider,  specific staff member): CLINICAL      What was the call regarding: PT BP IS RUNNING HIGH IN EVENINGS FOR ABOUT 6 WEEKS.  IN MORNING IT IS FINE, IN THE EVENINGS IT IS BETWEEN 178//82. FACE GETS FLUSHED. DO YOU NEED TO CHANGE MEDICATION? SHE TAKES METOPROLOL 25MG TWICE A DAY & OLMESARTAN-HYDROCHLOROTHIAZIDE 40-25 MG 1 TABLET DAILY. PT ALSO HAS KEPT LOG OF BP NUMBERS        Do you require a callback: YES

## 2023-07-27 ENCOUNTER — OFFICE VISIT (OUTPATIENT)
Dept: CARDIOLOGY | Facility: CLINIC | Age: 78
End: 2023-07-27
Payer: MEDICARE

## 2023-07-27 VITALS
HEIGHT: 64 IN | HEART RATE: 51 BPM | BODY MASS INDEX: 35.85 KG/M2 | SYSTOLIC BLOOD PRESSURE: 140 MMHG | WEIGHT: 210 LBS | DIASTOLIC BLOOD PRESSURE: 70 MMHG

## 2023-07-27 DIAGNOSIS — I48.0 PAROXYSMAL ATRIAL FIBRILLATION: ICD-10-CM

## 2023-07-27 DIAGNOSIS — I25.10 CORONARY ARTERIOSCLEROSIS IN NATIVE ARTERY: Primary | ICD-10-CM

## 2023-07-27 DIAGNOSIS — E78.5 DYSLIPIDEMIA: ICD-10-CM

## 2023-07-27 DIAGNOSIS — Z79.01 ANTICOAGULATED: ICD-10-CM

## 2023-07-27 DIAGNOSIS — I10 BENIGN ESSENTIAL HYPERTENSION: ICD-10-CM

## 2023-07-27 NOTE — PROGRESS NOTES
Subjective:        Mag Silva is a 77 y.o. female who here for follow up    CC  Follow-up coronary artery disease hypertension hyperlipidemia  HPI  77 years old female with coronary artery disease hypertension dyslipidemia atrial fibrillation anticoagulation here for the follow-up with no complaints of chest pains tightness heaviness or the pressure sensation     Problems Addressed this Visit          Cardiac and Vasculature    Coronary arteriosclerosis in native artery - Primary    Benign essential hypertension    Dyslipidemia    Paroxysmal atrial fibrillation       Coag and Thromboembolic    Anticoagulated     Diagnoses         Codes Comments    Coronary arteriosclerosis in native artery    -  Primary ICD-10-CM: I25.10  ICD-9-CM: 414.01     Benign essential hypertension     ICD-10-CM: I10  ICD-9-CM: 401.1     Anticoagulated     ICD-10-CM: Z79.01  ICD-9-CM: V58.61     Paroxysmal atrial fibrillation     ICD-10-CM: I48.0  ICD-9-CM: 427.31     Dyslipidemia     ICD-10-CM: E78.5  ICD-9-CM: 272.4           .    The following portions of the patient's history were reviewed and updated as appropriate: allergies, current medications, past family history, past medical history, past social history, past surgical history and problem list.    Past Medical History:   Diagnosis Date    Atrial fibrillation     Chest pain     COPD (chronic obstructive pulmonary disease)     Coronary artery disease     Crescendo angina     Dyslipidemia     Hypertension     PVC (premature ventricular contraction)     Syncope      reports that she quit smoking about 19 years ago. Her smoking use included cigarettes. She has a 30.00 pack-year smoking history. She has never used smokeless tobacco. She reports that she does not drink alcohol and does not use drugs.   Family History   Problem Relation Age of Onset    Stroke Mother     Asthma Father     Cancer Father        Review of Systems  Constitutional: No wt loss, fever,  "fatigue  Gastrointestinal: No nausea, abdominal pain  Behavioral/Psych: No insomnia or anxiety   Cardiovascular no chest pains or tightness in the chest  Objective:       Physical Exam           Physical Exam  /70 (BP Location: Left arm)   Pulse 51   Ht 162.6 cm (64\")   Wt 95.3 kg (210 lb)   BMI 36.05 kg/m²     General appearance: No acute changes   Eyes: Sclerae conjunctivae normal, pupils reactive   HENT: Atraumatic; oropharynx clear with moist mucous membranes and no mucosal ulcerations;  Neck: Trachea midline; NECK, supple, no thyromegaly or lymphadenopathy   Lungs: Normal size and shape, normal breath sounds, equal distribution of air, no rales and rhonchi   CV: S1-S2 regular, no murmurs, no rub, no gallop   Abdomen: Soft, nontender; no masses , no abnormal abdominal sounds   Extremities: No deformity , normal color , no peripheral edema   Skin: Normal temperature, turgor and texture; no rash, ulcers  Psych: Appropriate affect, alert and oriented to person, place and time             ECG 12 Lead    Date/Time: 7/27/2023 10:40 AM  Performed by: Tony Ayala MD  Authorized by: Tony Ayala MD   Comparison: compared with previous ECG   Similar to previous ECG  Rhythm: sinus rhythm  ST Flattening: all    Clinical impression: non-specific ECG          Echocardiogram:        Current Outpatient Medications:     Acetaminophen (TYLENOL 8 HOUR ARTHRITIS PAIN PO), Take  by mouth., Disp: , Rfl:     amiodarone (PACERONE) 200 MG tablet, Take 0.5 tablets by mouth Daily., Disp: 180 tablet, Rfl: 1    apixaban (ELIQUIS) 2.5 MG tablet tablet, Take 2 tablets by mouth Every 12 (Twelve) Hours. Indications: Atrial Fibrillation, Atrial Fibrillation, Disp: 60 tablet, Rfl: 6    aspirin 81 MG chewable tablet, Chew 1 tablet Every Night., Disp: , Rfl:     atorvastatin (LIPITOR) 10 MG tablet, Take 1 tablet by mouth Daily., Disp: , Rfl:     Cholecalciferol (VITAMIN D3) 2000 UNITS capsule, Take 1 capsule by mouth " Every Night., Disp: , Rfl:     levocetirizine (XYZAL) 5 MG tablet, Take 1 tablet by mouth Daily., Disp: , Rfl:     metoprolol tartrate (LOPRESSOR) 25 MG tablet, Take 1 tablet by mouth Every 12 (Twelve) Hours., Disp: 180 tablet, Rfl: 3    Multiple Vitamins-Minerals (MULTI COMPLETE PO), Take  by mouth., Disp: , Rfl:     nitroglycerin (NITROSTAT) 0.4 MG SL tablet, 1 under the tongue as needed for angina, may repeat q5mins for up three doses, Disp: 25 tablet, Rfl: 3    olmesartan-hydrochlorothiazide (Benicar HCT) 40-25 MG per tablet, Take 1 tablet by mouth Daily., Disp: 90 tablet, Rfl: 3    Omega-3 Fatty Acids (FISH OIL) 1000 MG capsule capsule, Take  by mouth Daily With Breakfast., Disp: , Rfl:     tiotropium bromide-olodaterol (STIOLTO RESPIMAT) 2.5-2.5 MCG/ACT aerosol solution inhaler, Inhale Daily., Disp: , Rfl:     zolpidem (AMBIEN) 10 MG tablet, Take 1 tablet by mouth At Night As Needed for Sleep., Disp: , Rfl:    Assessment:        Patient Active Problem List   Diagnosis    Coronary arteriosclerosis in native artery    Benign essential hypertension    Chronic coronary artery disease    Chest pain    Chronic obstructive pulmonary disease    Dyslipidemia    Ventricular premature beats    Syncope    Hyperlipidemia    Overweight    Paroxysmal atrial fibrillation    Anticoagulated    Atrial fibrillation with RVR               Plan:            ICD-10-CM ICD-9-CM   1. Coronary arteriosclerosis in native artery  I25.10 414.01   2. Benign essential hypertension  I10 401.1   3. Anticoagulated  Z79.01 V58.61   4. Paroxysmal atrial fibrillation  I48.0 427.31   5. Dyslipidemia  E78.5 272.4     1. Coronary arteriosclerosis in native artery  No angina pectoris    2. Benign essential hypertension  Blood pressure under control    3. Anticoagulated  Continue current treatment    4. Paroxysmal atrial fibrillation  Under control    5. Dyslipidemia  Continue current treatment      See in 1 yr  echo  COUNSELING:    Mag LATHAM  Sis was given to patient for the following topics: diagnostic results, risk factor reductions, impressions, risks and benefits of treatment options and importance of treatment compliance .       SMOKING COUNSELING:        Dictated using Dragon dictation

## 2023-08-16 ENCOUNTER — OFFICE VISIT (OUTPATIENT)
Dept: NEUROLOGY | Facility: CLINIC | Age: 78
End: 2023-08-16
Payer: MEDICARE

## 2023-08-16 VITALS
HEART RATE: 48 BPM | WEIGHT: 204.8 LBS | OXYGEN SATURATION: 98 % | HEIGHT: 64 IN | BODY MASS INDEX: 34.97 KG/M2 | DIASTOLIC BLOOD PRESSURE: 72 MMHG | SYSTOLIC BLOOD PRESSURE: 142 MMHG

## 2023-08-16 DIAGNOSIS — R25.1 TREMOR: Primary | ICD-10-CM

## 2023-08-16 NOTE — PROGRESS NOTES
Subjective:     Patient ID: Mag Silva is a 77 y.o. female.    History of Present Illness  The patient is a 77-year-old right-handed female with history of arthritis, asthma, atrial fibrillation, cataracts, chronic bronchitis, COPD, hearing loss, heart attack, kidney stones, and hypertension who presents to the neurology clinic today as a new patient for the evaluation of tremor.  I reviewed the patient's records.  I reviewed the referring providers note from March 20, 2023.  He reports that the patient has a history of low back pain with radiculopathy, GERD, hyperlipidemia, hypertension, coronary artery disease status post stenting, insomnia, vitamin D deficiency, COPD and prediabetes.  He reports that the patient has shaking of her hands which started back in January.  The right hand is worse than the left.    Here today for evaluation of tremors in her hands.  Has had it in her right hand for years, mainly with action.  Sister had similar symptoms.  Father was diagnosed with chorea.  Started in left hand about a year ago.  Tremor improved when her amiodarone was decreased.  Notices it when she is holding things.  Brother may have a minor tremor.  Being upset may make the tremor worse.  TSH in Dec was normal.  Resting hand makes it better.  No treatments for tremor. Preferring provider wanted to increase her night time dose of metoprolol to help with tremor, but patient has trouble cutting pills and hasn't made that change.  No falls in the past year.  The tremor does not prevent her from doing anything.  Doesn't feel like her movements have gotten slower.  No muscle stiffness.  Sense of smell is good.  Handwriting has not gotten smaller.  No bed partner.  Has never been told that she has dream enactment.  No vivid dreams.    No issues with constipation.  No orthostasis.  No EtOH use.      The following portions of the patient's history were reviewed and updated as appropriate: allergies, current  medications, past family history, past medical history, past social history, past surgical history, and problem list.    Review of Systems     Objective:    Neurological Exam    Physical Exam    Constitutional:  Vital signs reviewed.  No apparent distress.  Well groomed.  Eyes:  No injection, no icterus.    Respiratory:  Normal effort.  Clear to auscultation bilaterally.  Cardiovascular:  Regular rate and rhythm.  No murmurs.  No carotid bruits. Symmetric radial pulses.  Musculoskeletal: Normal station.  Gait steady.  Decreased arm swing on the right.  Patient able to walk on heels and toes.  Tandem gait intact.  Romberg negative.  Muscle tone and bulk normal in the bilateral upper and lower extremities.  Strength is 5/5 in the bilateral upper and lower extremities proximally and distally unless otherwise specified in the neurological exam.  Skin:  No rashes.  Warm, dry, and intact.  Psychiatric:  Good mood.  Normal affect.    Neurologic:  Mental status-  The patient is alert and oriented to person, place and time. Attention/concentration is within normal limits.  Speech is fluent without dysarthria.  The patient is able to name, repeat and follow complex commands without difficulty.  Immediate memory intact.  The patient recalled 1 out of 3 words after 4 minutes.  Fund of knowledge normal.  Cranial nerves- Pupils equally round and reactive to light with intact accomodation.  Visual fields intact.  Extraocular movements intact.  Facial sensation intact.  He has decreased the right nasolabial fold.  Hearing decreased to finger-rub on the right.  Palate elevates symmetrically.  SCM and trapezius are 5/5 bilaterally.  Tongue is midline.  Motor-  See musculoskeletal above.  There is a right resting hand tremor.  No tremor seen when pouring water between 2 cups.  Her spirals are scanned in.  Reflexes- 2+ in the bilateral biceps, brachioradialis, absent in the bilateral patellar and achilles.  Toes down-going  bilaterally.  Sensation- Intact to pinprick and vibration in bilateral upper and lower extremities symmetrically.  Coordination- Intact to finger to nose and heel knee shin bilaterally.   Gait- See musculoskeletal exam above.         Assessment/Plan:    The patient is a 77-year-old right-handed female with history of arthritis, asthma, atrial fibrillation, cataracts, chronic bronchitis, COPD, hearing loss, coronary artery disease status post stent, kidney stones, low back pain with radiculopathy, GERD, hyperlipidemia, insomnia, vitamin D deficiency, prediabetes and hypertension who presents for evaluation of tremor.    1.  Tremor-the patient has some features of essential tremor but also has some parkinsonian features as well.  The patient's symptoms are very mild today.  I do not recommend any further testing.  Her symptoms are not preventing her from doing anything and therefore treatment is likely not warranted.  It was likely exacerbated by higher doses of amiodarone.  I would recommend following her symptoms over time.  We will see her back in 6 months or sooner if needed.    A total of 60 minutes of time was spent on this encounter today.  This includes reviewing the patient's records, face-to-face time, and documentation.       Problems Addressed this Visit    None  Visit Diagnoses       Tremor    -  Primary          Diagnoses         Codes Comments    Tremor    -  Primary ICD-10-CM: R25.1  ICD-9-CM: 781.0

## 2023-08-16 NOTE — LETTER
August 16, 2023     Vicki Caceres MD  [address]  [facility] [address]    Patient: Mag Silva   YOB: 1945   Date of Visit: 8/16/2023     Dear Vicki Caceres MD:       Thank you for referring Mag Silva to me for evaluation. Below are the relevant portions of my assessment and plan of care.    If you have questions, please do not hesitate to call me. I look forward to following Mag along with you.         Sincerely,        Jeimy Dunlap MD        CC: No Recipients    Jeimy Dunlap MD  08/16/23 1446  Sign when Signing Visit  Subjective:     Patient ID: Mag Silva is a 77 y.o. female.    History of Present Illness  The patient is a 77-year-old right-handed female with history of arthritis, asthma, atrial fibrillation, cataracts, chronic bronchitis, COPD, hearing loss, heart attack, kidney stones, and hypertension who presents to the neurology clinic today as a new patient for the evaluation of tremor.  I reviewed the patient's records.  I reviewed the referring providers note from March 20, 2023.  He reports that the patient has a history of low back pain with radiculopathy, GERD, hyperlipidemia, hypertension, coronary artery disease status post stenting, insomnia, vitamin D deficiency, COPD and prediabetes.  He reports that the patient has shaking of her hands which started back in January.  The right hand is worse than the left.    Here today for evaluation of tremors in her hands.  Has had it in her right hand for years, mainly with action.  Sister had similar symptoms.  Father was diagnosed with chorea.  Started in left hand about a year ago.  Tremor improved when her amiodarone was decreased.  Notices it when she is holding things.  Brother may have a minor tremor.  Being upset may make the tremor worse.  TSH in Dec was normal.  Resting hand makes it better.  No treatments for tremor. Preferring provider wanted to increase her night time dose of  metoprolol to help with tremor, but patient has trouble cutting pills and hasn't made that change.  No falls in the past year.  The tremor does not prevent her from doing anything.  Doesn't feel like her movements have gotten slower.  No muscle stiffness.  Sense of smell is good.  Handwriting has not gotten smaller.  No bed partner.  Has never been told that she has dream enactment.  No vivid dreams.    No issues with constipation.  No orthostasis.  No EtOH use.      The following portions of the patient's history were reviewed and updated as appropriate: allergies, current medications, past family history, past medical history, past social history, past surgical history, and problem list.    Review of Systems     Objective:    Neurological Exam    Physical Exam    Constitutional:  Vital signs reviewed.  No apparent distress.  Well groomed.  Eyes:  No injection, no icterus.    Respiratory:  Normal effort.  Clear to auscultation bilaterally.  Cardiovascular:  Regular rate and rhythm.  No murmurs.  No carotid bruits. Symmetric radial pulses.  Musculoskeletal: Normal station.  Gait steady.  Decreased arm swing on the right.  Patient able to walk on heels and toes.  Tandem gait intact.  Romberg negative.  Muscle tone and bulk normal in the bilateral upper and lower extremities.  Strength is 5/5 in the bilateral upper and lower extremities proximally and distally unless otherwise specified in the neurological exam.  Skin:  No rashes.  Warm, dry, and intact.  Psychiatric:  Good mood.  Normal affect.    Neurologic:  Mental status-  The patient is alert and oriented to person, place and time. Attention/concentration is within normal limits.  Speech is fluent without dysarthria.  The patient is able to name, repeat and follow complex commands without difficulty.  Immediate memory intact.  The patient recalled 1 out of 3 words after 4 minutes.  Fund of knowledge normal.  Cranial nerves- Pupils equally round and reactive to  light with intact accomodation.  Visual fields intact.  Extraocular movements intact.  Facial sensation intact.  He has decreased the right nasolabial fold.  Hearing decreased to finger-rub on the right.  Palate elevates symmetrically.  SCM and trapezius are 5/5 bilaterally.  Tongue is midline.  Motor-  See musculoskeletal above.  There is a right resting hand tremor.  No tremor seen when pouring water between 2 cups.  Her spirals are scanned in.  Reflexes- 2+ in the bilateral biceps, brachioradialis, absent in the bilateral patellar and achilles.  Toes down-going bilaterally.  Sensation- Intact to pinprick and vibration in bilateral upper and lower extremities symmetrically.  Coordination- Intact to finger to nose and heel knee shin bilaterally.   Gait- See musculoskeletal exam above.         Assessment/Plan:    The patient is a 77-year-old right-handed female with history of arthritis, asthma, atrial fibrillation, cataracts, chronic bronchitis, COPD, hearing loss, coronary artery disease status post stent, kidney stones, low back pain with radiculopathy, GERD, hyperlipidemia, insomnia, vitamin D deficiency, prediabetes and hypertension who presents for evaluation of tremor.    1.  Tremor-the patient has some features of essential tremor but also has some parkinsonian features as well.  The patient's symptoms are very mild today.  I do not recommend any further testing.  Her symptoms are not preventing her from doing anything and therefore treatment is likely not warranted.  It was likely exacerbated by higher doses of amiodarone.  I would recommend following her symptoms over time.  We will see her back in 6 months or sooner if needed.    A total of 60 minutes of time was spent on this encounter today.  This includes reviewing the patient's records, face-to-face time, and documentation.       Problems Addressed this Visit    None  Visit Diagnoses       Tremor    -  Primary          Diagnoses         Codes Comments     Tremor    -  Primary ICD-10-CM: R25.1  ICD-9-CM: 781.0

## 2023-08-24 ENCOUNTER — PATIENT ROUNDING (BHMG ONLY) (OUTPATIENT)
Dept: NEUROLOGY | Facility: CLINIC | Age: 78
End: 2023-08-24
Payer: MEDICARE

## 2023-08-24 NOTE — PROGRESS NOTES
A [employer] message has been sent to the patient for patient rounding

## 2023-11-07 DIAGNOSIS — I48.0 PAROXYSMAL ATRIAL FIBRILLATION: Primary | ICD-10-CM

## 2023-11-07 DIAGNOSIS — Z01.818 PRE-OP TESTING: ICD-10-CM

## 2023-11-07 DIAGNOSIS — Z01.810 PRE-OPERATIVE CARDIOVASCULAR EXAMINATION: ICD-10-CM

## 2023-11-09 ENCOUNTER — OFFICE VISIT (OUTPATIENT)
Dept: CARDIOLOGY | Facility: CLINIC | Age: 78
End: 2023-11-09
Payer: MEDICARE

## 2023-11-09 VITALS
BODY MASS INDEX: 34.83 KG/M2 | SYSTOLIC BLOOD PRESSURE: 138 MMHG | DIASTOLIC BLOOD PRESSURE: 64 MMHG | WEIGHT: 204 LBS | HEART RATE: 60 BPM | HEIGHT: 64 IN

## 2023-11-09 DIAGNOSIS — Z79.01 ANTICOAGULATED: ICD-10-CM

## 2023-11-09 DIAGNOSIS — I25.10 CORONARY ARTERIOSCLEROSIS IN NATIVE ARTERY: Primary | ICD-10-CM

## 2023-11-09 DIAGNOSIS — Z01.810 PRE-OPERATIVE CARDIOVASCULAR EXAMINATION: ICD-10-CM

## 2023-11-09 DIAGNOSIS — I10 BENIGN ESSENTIAL HYPERTENSION: ICD-10-CM

## 2023-11-09 DIAGNOSIS — I48.0 PAROXYSMAL ATRIAL FIBRILLATION: ICD-10-CM

## 2023-11-09 RX ORDER — CYCLOBENZAPRINE HCL 10 MG
10 TABLET ORAL
COMMUNITY
Start: 2023-10-23

## 2023-11-09 NOTE — PROGRESS NOTES
Subjective:        Mag Silva is a 78 y.o. female who here for follow up    CC  Preop for the breast surgery  HPI  78-year-old female with coronary artery disease hypertension paroxysmal atrial fibrillation denies any chest pains or tightness in the chest needs surgical clearance for the breast surgery     Problems Addressed this Visit          Cardiac and Vasculature    Coronary arteriosclerosis in native artery - Primary    Relevant Orders    Adult Transthoracic Echo Complete W/ Cont if Necessary Per Protocol    Stress Test With Myocardial Perfusion One Day    Benign essential hypertension    Relevant Orders    Adult Transthoracic Echo Complete W/ Cont if Necessary Per Protocol    Stress Test With Myocardial Perfusion One Day    Paroxysmal atrial fibrillation    Pre-operative cardiovascular examination    Relevant Orders    Adult Transthoracic Echo Complete W/ Cont if Necessary Per Protocol    Stress Test With Myocardial Perfusion One Day       Coag and Thromboembolic    Anticoagulated     Diagnoses         Codes Comments    Coronary arteriosclerosis in native artery    -  Primary ICD-10-CM: I25.10  ICD-9-CM: 414.01     Benign essential hypertension     ICD-10-CM: I10  ICD-9-CM: 401.1     Pre-operative cardiovascular examination     ICD-10-CM: Z01.810  ICD-9-CM: V72.81     Anticoagulated     ICD-10-CM: Z79.01  ICD-9-CM: V58.61     Paroxysmal atrial fibrillation     ICD-10-CM: I48.0  ICD-9-CM: 427.31           .    The following portions of the patient's history were reviewed and updated as appropriate: allergies, current medications, past family history, past medical history, past social history, past surgical history and problem list.    Past Medical History:   Diagnosis Date    Atrial fibrillation     Breast cancer     Chest pain     COPD (chronic obstructive pulmonary disease)     Coronary artery disease     Crescendo angina     Dyslipidemia     Hypertension     PVC (premature ventricular contraction)   "   Syncope      reports that she quit smoking about 19 years ago. Her smoking use included cigarettes. She has a 30.00 pack-year smoking history. She has never used smokeless tobacco. She reports that she does not drink alcohol and does not use drugs.   Family History   Problem Relation Age of Onset    Stroke Mother     Asthma Father     Cancer Father        Review of Systems  Constitutional: No wt loss, fever, fatigue  Gastrointestinal: No nausea, abdominal pain  Behavioral/Psych: No insomnia or anxiety   Cardiovascular no chest pains or tightness in the chest  Objective:       Physical Exam           Physical Exam  /64   Pulse 60   Ht 162.6 cm (64\")   Wt 92.5 kg (204 lb)   BMI 35.02 kg/m²     General appearance: No acute changes   Eyes: Sclerae conjunctivae normal, pupils reactive   HENT: Atraumatic; oropharynx clear with moist mucous membranes and no mucosal ulcerations;  Neck: Trachea midline; NECK, supple, no thyromegaly or lymphadenopathy   Lungs: Normal size and shape, normal breath sounds, equal distribution of air, no rales and rhonchi   CV: S1-S2 regular, no murmurs, no rub, no gallop   Abdomen: Soft, nontender; no masses , no abnormal abdominal sounds   Extremities: No deformity , normal color , no peripheral edema   Skin: Normal temperature, turgor and texture; no rash, ulcers  Psych: Appropriate affect, alert and oriented to person, place and time             ECG 12 Lead    Date/Time: 11/9/2023 12:38 PM  Performed by: Tony Ayala MD    Authorized by: Tony Ayala MD  Comparison: compared with previous ECG   Similar to previous ECG  Rhythm: sinus rhythm    Clinical impression: non-specific ECG            Echocardiogram:        Current Outpatient Medications:     Acetaminophen (TYLENOL 8 HOUR ARTHRITIS PAIN PO), Take  by mouth., Disp: , Rfl:     amiodarone (PACERONE) 200 MG tablet, Take 0.5 tablets by mouth Daily., Disp: 180 tablet, Rfl: 1    apixaban (ELIQUIS) 2.5 MG tablet " tablet, Take 2 tablets by mouth Every 12 (Twelve) Hours. Indications: Atrial Fibrillation, Atrial Fibrillation, Disp: 60 tablet, Rfl: 6    aspirin 81 MG chewable tablet, Chew 1 tablet Every Night., Disp: , Rfl:     atorvastatin (LIPITOR) 10 MG tablet, Take 1 tablet by mouth Daily., Disp: , Rfl:     Cholecalciferol (VITAMIN D3) 2000 UNITS capsule, Take 1 capsule by mouth Every Night., Disp: , Rfl:     cyclobenzaprine (FLEXERIL) 10 MG tablet, 1 tablet., Disp: , Rfl:     metoprolol tartrate (LOPRESSOR) 25 MG tablet, Take 1 tablet by mouth Every 12 (Twelve) Hours., Disp: 180 tablet, Rfl: 3    Multiple Vitamins-Minerals (MULTI COMPLETE PO), Take  by mouth., Disp: , Rfl:     nitroglycerin (NITROSTAT) 0.4 MG SL tablet, 1 under the tongue as needed for angina, may repeat q5mins for up three doses, Disp: 25 tablet, Rfl: 3    olmesartan-hydrochlorothiazide (Benicar HCT) 40-25 MG per tablet, Take 1 tablet by mouth Daily., Disp: 90 tablet, Rfl: 3    Omega-3 Fatty Acids (FISH OIL) 1000 MG capsule capsule, Take  by mouth Daily With Breakfast., Disp: , Rfl:     tiotropium bromide-olodaterol (STIOLTO RESPIMAT) 2.5-2.5 MCG/ACT aerosol solution inhaler, Inhale Daily., Disp: , Rfl:     zolpidem (AMBIEN) 10 MG tablet, Take 1 tablet by mouth At Night As Needed for Sleep., Disp: , Rfl:     levocetirizine (XYZAL) 5 MG tablet, Take 1 tablet by mouth Daily., Disp: , Rfl:    Assessment:        [unfilled]            Plan:            ICD-10-CM ICD-9-CM   1. Coronary arteriosclerosis in native artery  I25.10 414.01   2. Benign essential hypertension  I10 401.1   3. Pre-operative cardiovascular examination  Z01.810 V72.81   4. Anticoagulated  Z79.01 V58.61   5. Paroxysmal atrial fibrillation  I48.0 427.31     1. Coronary arteriosclerosis in native artery  Considering the patient's symptoms as well as clinical situation and  EKG findings, along with cardiac risk factors, ischemic workup is necessary to rule out ischemic cardiomyopathy, stress  induced arrhythmias, and functional capacity for diagnosis as well as prognostic consideration    - Adult Transthoracic Echo Complete W/ Cont if Necessary Per Protocol  - Stress Test With Myocardial Perfusion One Day    2. Benign essential hypertension  Continue current treatment  - Adult Transthoracic Echo Complete W/ Cont if Necessary Per Protocol  - Stress Test With Myocardial Perfusion One Day    3. Pre-operative cardiovascular examination  Considering patient's medical condition as well as the risk factors, patient will require echocardiogram for further evaluation for the LV function, four-chamber evaluation, including the pressures, valvular function and  pericardial disease and pericardial effusion    - Adult Transthoracic Echo Complete W/ Cont if Necessary Per Protocol  - Stress Test With Myocardial Perfusion One Day    4. Anticoagulated  Continue current treatment    5. Paroxysmal atrial fibrillation  Under control      TERRY AND ECHO FOR BREAST SURG CLEARANCE      COUNSELING:    Mag Alegre was given to patient for the following topics: diagnostic results, risk factor reductions, impressions, risks and benefits of treatment options and importance of treatment compliance .       SMOKING COUNSELING:        Dictated using Dragon dictation

## 2023-11-10 PROBLEM — Z01.810 PRE-OPERATIVE CARDIOVASCULAR EXAMINATION: Status: ACTIVE | Noted: 2023-11-10

## 2023-11-20 ENCOUNTER — HOSPITAL ENCOUNTER (OUTPATIENT)
Dept: CARDIOLOGY | Facility: HOSPITAL | Age: 78
Discharge: HOME | End: 2023-11-20
Payer: MEDICARE

## 2023-11-20 ENCOUNTER — HOSPITAL ENCOUNTER (OUTPATIENT)
Dept: CARDIOLOGY | Facility: HOSPITAL | Age: 78
Discharge: HOME | End: 2023-11-20
Admitting: INTERNAL MEDICINE
Payer: MEDICARE

## 2023-11-20 ENCOUNTER — OFFICE VISIT (OUTPATIENT)
Dept: CARDIOLOGY | Facility: CLINIC | Age: 78
End: 2023-11-20
Payer: MEDICARE

## 2023-11-20 VITALS
HEART RATE: 49 BPM | BODY MASS INDEX: 34.63 KG/M2 | DIASTOLIC BLOOD PRESSURE: 80 MMHG | SYSTOLIC BLOOD PRESSURE: 152 MMHG | WEIGHT: 202.82 LBS | HEIGHT: 64 IN

## 2023-11-20 VITALS — HEART RATE: 50 BPM | SYSTOLIC BLOOD PRESSURE: 176 MMHG | DIASTOLIC BLOOD PRESSURE: 71 MMHG

## 2023-11-20 VITALS
WEIGHT: 208 LBS | DIASTOLIC BLOOD PRESSURE: 80 MMHG | BODY MASS INDEX: 35.51 KG/M2 | SYSTOLIC BLOOD PRESSURE: 142 MMHG | HEIGHT: 64 IN | HEART RATE: 51 BPM

## 2023-11-20 DIAGNOSIS — I48.0 PAROXYSMAL ATRIAL FIBRILLATION: ICD-10-CM

## 2023-11-20 DIAGNOSIS — Z79.01 ANTICOAGULATED: ICD-10-CM

## 2023-11-20 DIAGNOSIS — I10 BENIGN ESSENTIAL HYPERTENSION: ICD-10-CM

## 2023-11-20 DIAGNOSIS — I25.10 CORONARY ARTERIOSCLEROSIS IN NATIVE ARTERY: Primary | ICD-10-CM

## 2023-11-20 DIAGNOSIS — E78.5 HYPERLIPIDEMIA, UNSPECIFIED HYPERLIPIDEMIA TYPE: ICD-10-CM

## 2023-11-20 LAB
AORTIC DIMENSIONLESS INDEX: 0.8 (DI)
ASCENDING AORTA: 3.1 CM
BH CV ECHO MEAS - ACS: 2 CM
BH CV ECHO MEAS - AO MAX PG: 8.1 MMHG
BH CV ECHO MEAS - AO MEAN PG: 4 MMHG
BH CV ECHO MEAS - AO ROOT DIAM: 3.3 CM
BH CV ECHO MEAS - AO V2 MAX: 142 CM/SEC
BH CV ECHO MEAS - AO V2 VTI: 34.7 CM
BH CV ECHO MEAS - AVA(I,D): 2.41 CM2
BH CV ECHO MEAS - EDV(CUBED): 97.3 ML
BH CV ECHO MEAS - EDV(MOD-SP2): 66 ML
BH CV ECHO MEAS - EDV(MOD-SP4): 79 ML
BH CV ECHO MEAS - EF(MOD-BP): 71.1 %
BH CV ECHO MEAS - EF(MOD-SP2): 69.7 %
BH CV ECHO MEAS - EF(MOD-SP4): 73.4 %
BH CV ECHO MEAS - ESV(CUBED): 17.6 ML
BH CV ECHO MEAS - ESV(MOD-SP2): 20 ML
BH CV ECHO MEAS - ESV(MOD-SP4): 21 ML
BH CV ECHO MEAS - FS: 43.5 %
BH CV ECHO MEAS - IVS/LVPW: 1 CM
BH CV ECHO MEAS - IVSD: 1.1 CM
BH CV ECHO MEAS - LAT PEAK E' VEL: 12.3 CM/SEC
BH CV ECHO MEAS - LV MASS(C)D: 181.2 GRAMS
BH CV ECHO MEAS - LV MAX PG: 5.3 MMHG
BH CV ECHO MEAS - LV MEAN PG: 2 MMHG
BH CV ECHO MEAS - LV V1 MAX: 115 CM/SEC
BH CV ECHO MEAS - LV V1 VTI: 26.6 CM
BH CV ECHO MEAS - LVIDD: 4.6 CM
BH CV ECHO MEAS - LVIDS: 2.6 CM
BH CV ECHO MEAS - LVOT AREA: 3.1 CM2
BH CV ECHO MEAS - LVOT DIAM: 2 CM
BH CV ECHO MEAS - LVPWD: 1.1 CM
BH CV ECHO MEAS - MED PEAK E' VEL: 9.5 CM/SEC
BH CV ECHO MEAS - MR MAX PG: 119.7 MMHG
BH CV ECHO MEAS - MR MAX VEL: 547 CM/SEC
BH CV ECHO MEAS - MV A DUR: 0.18 SEC
BH CV ECHO MEAS - MV A MAX VEL: 62.6 CM/SEC
BH CV ECHO MEAS - MV DEC SLOPE: 403 CM/SEC2
BH CV ECHO MEAS - MV DEC TIME: 0.21 SEC
BH CV ECHO MEAS - MV E MAX VEL: 102.1 CM/SEC
BH CV ECHO MEAS - MV E/A: 1.63
BH CV ECHO MEAS - MV MAX PG: 5.7 MMHG
BH CV ECHO MEAS - MV MEAN PG: 1 MMHG
BH CV ECHO MEAS - MV P1/2T: 90.8 MSEC
BH CV ECHO MEAS - MV V2 VTI: 39.5 CM
BH CV ECHO MEAS - MVA(P1/2T): 2.42 CM2
BH CV ECHO MEAS - MVA(VTI): 2.12 CM2
BH CV ECHO MEAS - PA ACC TIME: 0.18 SEC
BH CV ECHO MEAS - PA V2 MAX: 102 CM/SEC
BH CV ECHO MEAS - PI END-D VEL: 102 CM/SEC
BH CV ECHO MEAS - PULM A REVS DUR: 0.12 SEC
BH CV ECHO MEAS - PULM A REVS VEL: 23.8 CM/SEC
BH CV ECHO MEAS - PULM DIAS VEL: 30.6 CM/SEC
BH CV ECHO MEAS - PULM S/D: 1.24
BH CV ECHO MEAS - PULM SYS VEL: 37.8 CM/SEC
BH CV ECHO MEAS - QP/QS: 1.1
BH CV ECHO MEAS - RAP SYSTOLE: 8 MMHG
BH CV ECHO MEAS - RV MAX PG: 2.6 MMHG
BH CV ECHO MEAS - RV V1 MAX: 81.3 CM/SEC
BH CV ECHO MEAS - RV V1 VTI: 24.1 CM
BH CV ECHO MEAS - RVOT DIAM: 2.2 CM
BH CV ECHO MEAS - RVSP: 43 MMHG
BH CV ECHO MEAS - SV(LVOT): 83.6 ML
BH CV ECHO MEAS - SV(MOD-SP2): 46 ML
BH CV ECHO MEAS - SV(MOD-SP4): 58 ML
BH CV ECHO MEAS - SV(RVOT): 91.6 ML
BH CV ECHO MEAS - TAPSE (>1.6): 2.07 CM
BH CV ECHO MEAS - TR MAX PG: 35.3 MMHG
BH CV ECHO MEAS - TR MAX VEL: 297 CM/SEC
BH CV ECHO MEASUREMENTS AVERAGE E/E' RATIO: 9.37
BH CV REST NUCLEAR ISOTOPE DOSE: 10.7 MCI
BH CV STRESS BP STAGE 1: NORMAL
BH CV STRESS COMMENTS STAGE 1: NORMAL
BH CV STRESS DOSE REGADENOSON STAGE 1: 0.4
BH CV STRESS DURATION MIN STAGE 1: 2
BH CV STRESS DURATION SEC STAGE 1: 0
BH CV STRESS GRADE STAGE 1: 0
BH CV STRESS HR STAGE 1: 81
BH CV STRESS METS STAGE 1: 1.8
BH CV STRESS NUCLEAR ISOTOPE DOSE: 32.1 MCI
BH CV STRESS PROTOCOL 1: NORMAL
BH CV STRESS RECOVERY BP: NORMAL MMHG
BH CV STRESS RECOVERY HR: 61 BPM
BH CV STRESS SPEED STAGE 1: 1
BH CV STRESS STAGE 1: 1
BH CV XLRA - RV BASE: 2.9 CM
BH CV XLRA - RV LENGTH: 6.6 CM
BH CV XLRA - RV MID: 2.6 CM
BH CV XLRA - TDI S': 11.5 CM/SEC
LEFT ATRIUM VOLUME INDEX: 29.1 ML/M2
LV EF NUC BP: 60 %
MAXIMAL PREDICTED HEART RATE: 143 BPM
PERCENT MAX PREDICTED HR: 56.64 %
SINUS: 3 CM
STJ: 2.42 CM
STRESS BASELINE BP: NORMAL MMHG
STRESS BASELINE HR: 59 BPM
STRESS PERCENT HR: 67 %
STRESS POST ESTIMATED WORKLOAD: 1.8 METS
STRESS POST EXERCISE DUR MIN: 2 MIN
STRESS POST EXERCISE DUR SEC: 0 SEC
STRESS POST PEAK BP: NORMAL MMHG
STRESS POST PEAK HR: 81 BPM
STRESS TARGET HR: 122 BPM

## 2023-11-20 PROCEDURE — A9500 TC99M SESTAMIBI: HCPCS | Performed by: INTERNAL MEDICINE

## 2023-11-20 PROCEDURE — 93306 TTE W/DOPPLER COMPLETE: CPT

## 2023-11-20 PROCEDURE — 93016 CV STRESS TEST SUPVJ ONLY: CPT

## 2023-11-20 PROCEDURE — 78452 HT MUSCLE IMAGE SPECT MULT: CPT | Performed by: INTERNAL MEDICINE

## 2023-11-20 PROCEDURE — 93306 TTE W/DOPPLER COMPLETE: CPT | Performed by: INTERNAL MEDICINE

## 2023-11-20 PROCEDURE — 93018 CV STRESS TEST I&R ONLY: CPT | Performed by: INTERNAL MEDICINE

## 2023-11-20 PROCEDURE — 25010000002 REGADENOSON 0.4 MG/5ML SOLUTION: Performed by: INTERNAL MEDICINE

## 2023-11-20 PROCEDURE — 0 TECHNETIUM SESTAMIBI: Performed by: INTERNAL MEDICINE

## 2023-11-20 PROCEDURE — 93017 CV STRESS TEST TRACING ONLY: CPT

## 2023-11-20 PROCEDURE — 78452 HT MUSCLE IMAGE SPECT MULT: CPT

## 2023-11-20 RX ORDER — REGADENOSON 0.08 MG/ML
0.4 INJECTION, SOLUTION INTRAVENOUS
Status: COMPLETED | OUTPATIENT
Start: 2023-11-20 | End: 2023-11-20

## 2023-11-20 RX ADMIN — TECHNETIUM TC 99M SESTAMIBI 1 DOSE: 1 INJECTION INTRAVENOUS at 08:00

## 2023-11-20 RX ADMIN — REGADENOSON 0.4 MG: 0.08 INJECTION, SOLUTION INTRAVENOUS at 09:31

## 2023-11-20 RX ADMIN — TECHNETIUM TC 99M SESTAMIBI 1 DOSE: 1 INJECTION INTRAVENOUS at 09:31

## 2023-11-20 NOTE — PROGRESS NOTES
TEST RESULTS - CLEARANCE FOR BREAST CANCER SURGERY - DR. SCARLETT GONZALEZ   Subjective:        Mag Silva is a 78 y.o. female who here for follow up    CC  Follow-up coronary artery disease hypertension  HPI  78-year-old female with coronary artery disease hypertension hyperlipidemia paroxysmal atrial fibrillation here for the follow-up needs a clearance for the breast surgery     Problems Addressed this Visit          Cardiac and Vasculature    Coronary arteriosclerosis in native artery - Primary    Benign essential hypertension    Hyperlipidemia    Paroxysmal atrial fibrillation       Coag and Thromboembolic    Anticoagulated     Diagnoses         Codes Comments    Coronary arteriosclerosis in native artery    -  Primary ICD-10-CM: I25.10  ICD-9-CM: 414.01     Benign essential hypertension     ICD-10-CM: I10  ICD-9-CM: 401.1     Anticoagulated     ICD-10-CM: Z79.01  ICD-9-CM: V58.61     Paroxysmal atrial fibrillation     ICD-10-CM: I48.0  ICD-9-CM: 427.31     Hyperlipidemia, unspecified hyperlipidemia type     ICD-10-CM: E78.5  ICD-9-CM: 272.4           .    The following portions of the patient's history were reviewed and updated as appropriate: allergies, current medications, past family history, past medical history, past social history, past surgical history and problem list.    Past Medical History:   Diagnosis Date    Atrial fibrillation     Breast cancer     Chest pain     COPD (chronic obstructive pulmonary disease)     Coronary artery disease     Crescendo angina     Dyslipidemia     Hypertension     PVC (premature ventricular contraction)     Syncope      reports that she quit smoking about 19 years ago. Her smoking use included cigarettes. She has a 30.00 pack-year smoking history. She has never used smokeless tobacco. She reports that she does not drink alcohol and does not use drugs.   Family History   Problem Relation Age of Onset    Stroke Mother     Asthma Father     Cancer Father   "      Review of Systems  Constitutional: No wt loss, fever, fatigue  Gastrointestinal: No nausea, abdominal pain  Behavioral/Psych: No insomnia or anxiety   Cardiovascular no chest pains or tightness in the chest  Objective:       Physical Exam  /80   Pulse 51   Ht 162.6 cm (64\")   Wt 94.3 kg (208 lb)   BMI 35.70 kg/m²   General appearance: No acute changes   Neck: Trachea midline; NECK, supple, no thyromegaly or lymphadenopathy   Lungs: Normal size and shape, normal breath sounds, equal distribution of air, no rales and rhonchi   CV: S1-S2 regular, no murmurs, no rub, no gallop   Abdomen: Soft, nontender; no masses , no abnormal abdominal sounds   Extremities: No deformity , normal color , no peripheral edema   Skin: Normal temperature, turgor and texture; no rash, ulcers          Procedures      Findings consistent with a normal ECG stress test.   Echocardiogram:        Current Outpatient Medications:     Acetaminophen (TYLENOL 8 HOUR ARTHRITIS PAIN PO), Take  by mouth., Disp: , Rfl:     amiodarone (PACERONE) 200 MG tablet, Take 0.5 tablets by mouth Daily., Disp: 180 tablet, Rfl: 1    apixaban (ELIQUIS) 2.5 MG tablet tablet, Take 2 tablets by mouth Every 12 (Twelve) Hours. Indications: Atrial Fibrillation, Atrial Fibrillation, Disp: 60 tablet, Rfl: 6    aspirin 81 MG chewable tablet, Chew 1 tablet Every Night., Disp: , Rfl:     atorvastatin (LIPITOR) 10 MG tablet, Take 1 tablet by mouth Daily., Disp: , Rfl:     Cholecalciferol (VITAMIN D3) 2000 UNITS capsule, Take 1 capsule by mouth Every Night., Disp: , Rfl:     cyclobenzaprine (FLEXERIL) 10 MG tablet, 1 tablet., Disp: , Rfl:     levocetirizine (XYZAL) 5 MG tablet, Take 1 tablet by mouth Daily., Disp: , Rfl:     metoprolol tartrate (LOPRESSOR) 25 MG tablet, Take 1 tablet by mouth Every 12 (Twelve) Hours., Disp: 180 tablet, Rfl: 3    Multiple Vitamins-Minerals (MULTI COMPLETE PO), Take  by mouth., Disp: , Rfl:     nitroglycerin (NITROSTAT) 0.4 MG SL " tablet, 1 under the tongue as needed for angina, may repeat q5mins for up three doses, Disp: 25 tablet, Rfl: 3    olmesartan-hydrochlorothiazide (Benicar HCT) 40-25 MG per tablet, Take 1 tablet by mouth Daily., Disp: 90 tablet, Rfl: 3    Omega-3 Fatty Acids (FISH OIL) 1000 MG capsule capsule, Take  by mouth Daily With Breakfast., Disp: , Rfl:     tiotropium bromide-olodaterol (STIOLTO RESPIMAT) 2.5-2.5 MCG/ACT aerosol solution inhaler, Inhale Daily., Disp: , Rfl:     zolpidem (AMBIEN) 10 MG tablet, Take 1 tablet by mouth At Night As Needed for Sleep., Disp: , Rfl:    Assessment:        [unfilled]            Plan:            ICD-10-CM ICD-9-CM   1. Coronary arteriosclerosis in native artery  I25.10 414.01   2. Benign essential hypertension  I10 401.1   3. Anticoagulated  Z79.01 V58.61   4. Paroxysmal atrial fibrillation  I48.0 427.31   5. Hyperlipidemia, unspecified hyperlipidemia type  E78.5 272.4     1. Coronary arteriosclerosis in native artery  No angina pectoris    2. Benign essential hypertension  Continue current treatment    3. Anticoagulated  Continue current treatment    4. Paroxysmal atrial fibrillation  Under control    5. Hyperlipidemia, unspecified hyperlipidemia type  Continue current treatment    Specificity and sensitivity of the stress test/ cardiac workup has been explained. Pt has been explained if  Symptoms continue please go to ER, and further w/p will be required.    Also explained this does not rule out coronary artery disease or the future events, continue to emphasize on risk reductions for coronary artery disease    Pt also advised to contact PCP for other causes of symptoms    1 yr  COUNSELING:    Mag Alegre was given to patient for the following topics: diagnostic results, risk factor reductions, impressions, risks and benefits of treatment options and importance of treatment compliance .       SMOKING COUNSELING:        Dictated using Dragon dictation

## 2024-11-25 ENCOUNTER — OFFICE VISIT (OUTPATIENT)
Dept: CARDIOLOGY | Facility: CLINIC | Age: 79
End: 2024-11-25
Payer: MEDICARE

## 2024-11-25 VITALS
HEART RATE: 49 BPM | SYSTOLIC BLOOD PRESSURE: 129 MMHG | BODY MASS INDEX: 35.51 KG/M2 | DIASTOLIC BLOOD PRESSURE: 75 MMHG | HEIGHT: 64 IN | WEIGHT: 208 LBS

## 2024-11-25 DIAGNOSIS — I25.10 CORONARY ARTERIOSCLEROSIS IN NATIVE ARTERY: Primary | ICD-10-CM

## 2024-11-25 DIAGNOSIS — E78.5 HYPERLIPIDEMIA, UNSPECIFIED HYPERLIPIDEMIA TYPE: ICD-10-CM

## 2024-11-25 DIAGNOSIS — I10 BENIGN ESSENTIAL HYPERTENSION: ICD-10-CM

## 2024-11-25 DIAGNOSIS — Z79.01 ANTICOAGULATED: ICD-10-CM

## 2024-11-25 RX ORDER — AMIODARONE HYDROCHLORIDE 100 MG/1
100 TABLET ORAL DAILY
COMMUNITY
Start: 2024-10-28

## 2024-11-25 RX ORDER — ATORVASTATIN CALCIUM 20 MG/1
20 TABLET, FILM COATED ORAL NIGHTLY
COMMUNITY
Start: 2024-10-28

## 2024-11-25 RX ORDER — ANASTROZOLE 1 MG/1
TABLET ORAL
COMMUNITY
Start: 2024-11-12

## 2024-11-25 NOTE — PROGRESS NOTES
1 YR FOLLOW UP    Subjective:        Mag Silva is a 79 y.o. female who here for follow up    CC  Follow-up coronary artery disease hypertension hyperlipidemia  HPI  79-year-old female with coronary atherosclerosis hypertension hyperlipidemia anticoagulation here for the follow-up denies any chest pains tightness heaviness or the pressure sensation     Problems Addressed this Visit          Cardiac and Vasculature    Coronary arteriosclerosis in native artery - Primary    Benign essential hypertension    Hyperlipidemia    Relevant Medications    atorvastatin (LIPITOR) 20 MG tablet       Coag and Thromboembolic    Anticoagulated     Diagnoses         Codes Comments    Coronary arteriosclerosis in native artery    -  Primary ICD-10-CM: I25.10  ICD-9-CM: 414.01     Benign essential hypertension     ICD-10-CM: I10  ICD-9-CM: 401.1     Anticoagulated     ICD-10-CM: Z79.01  ICD-9-CM: V58.61     Hyperlipidemia, unspecified hyperlipidemia type     ICD-10-CM: E78.5  ICD-9-CM: 272.4           .    The following portions of the patient's history were reviewed and updated as appropriate: allergies, current medications, past family history, past medical history, past social history, past surgical history and problem list.    Past Medical History:   Diagnosis Date    Atrial fibrillation     Breast cancer     Chest pain     COPD (chronic obstructive pulmonary disease)     Coronary artery disease     Crescendo angina     Dyslipidemia     Hypertension     PVC (premature ventricular contraction)     Syncope      reports that she quit smoking about 20 years ago. Her smoking use included cigarettes. She started smoking about 40 years ago. She has a 30 pack-year smoking history. She has been exposed to tobacco smoke. She has never used smokeless tobacco. She reports that she does not drink alcohol and does not use drugs.   Family History   Problem Relation Age of Onset    Stroke Mother     Asthma Father     Cancer Father   "      Review of Systems  Constitutional: No wt loss, fever, fatigue  Gastrointestinal: No nausea, abdominal pain  Behavioral/Psych: No insomnia or anxiety   Cardiovascular no chest pains or tightness in the chest  Objective:       Physical Exam  /75   Pulse (!) 49   Ht 162.6 cm (64\")   Wt 94.3 kg (208 lb)   BMI 35.70 kg/m²   General appearance: No acute changes   Neck: Trachea midline; NECK, supple, no thyromegaly or lymphadenopathy   Lungs: Normal size and shape, normal breath sounds, equal distribution of air, no rales and rhonchi   CV: S1-S2 regular, no murmurs, no rub, no gallop   Abdomen: Soft, nontender; no masses , no abnormal abdominal sounds   Extremities: No deformity , normal color , no peripheral edema   Skin: Normal temperature, turgor and texture; no rash, ulcers            ECG 12 Lead    Date/Time: 11/25/2024 11:00 AM  Performed by: Tony Ayala MD    Authorized by: Tony Ayala MD  Comparison: compared with previous ECG   Similar to previous ECG  Rhythm: sinus rhythm    Clinical impression: non-specific ECG            Echocardiogram:    Results for orders placed in visit on 11/09/23    Adult Transthoracic Echo Complete W/ Cont if Necessary Per Protocol    Interpretation Summary    Left ventricular ejection fraction appears to be 66 - 70%.    Left ventricular diastolic function was normal.    Estimated right ventricular systolic pressure from tricuspid regurgitation is mildly elevated (35-45 mmHg).          Current Outpatient Medications:     Acetaminophen (TYLENOL 8 HOUR ARTHRITIS PAIN PO), Take  by mouth., Disp: , Rfl:     amiodarone (PACERONE) 100 MG tablet, Take 1 tablet by mouth Daily., Disp: , Rfl:     anastrozole (ARIMIDEX) 1 MG tablet, , Disp: , Rfl:     apixaban (ELIQUIS) 2.5 MG tablet tablet, Take 1 tablet by mouth Every 12 (Twelve) Hours. PT HAS ONLY BEEN TAKING ONCE DAILY  Indications: Atrial Fibrillation, Atrial Fibrillation, Disp: 60 tablet, Rfl: 3    " aspirin 81 MG chewable tablet, Chew 1 tablet Every Night., Disp: , Rfl:     atorvastatin (LIPITOR) 20 MG tablet, Take 1 tablet by mouth Every Night., Disp: , Rfl:     Cholecalciferol (VITAMIN D3) 2000 UNITS capsule, Take 1 capsule by mouth Every Night., Disp: , Rfl:     cyclobenzaprine (FLEXERIL) 10 MG tablet, 1 tablet., Disp: , Rfl:     levocetirizine (XYZAL) 5 MG tablet, Take 1 tablet by mouth Daily., Disp: , Rfl:     metoprolol tartrate (LOPRESSOR) 25 MG tablet, Take 1 tablet by mouth Every 12 (Twelve) Hours., Disp: 180 tablet, Rfl: 3    Multiple Vitamins-Minerals (MULTI COMPLETE PO), Take  by mouth., Disp: , Rfl:     olmesartan-hydrochlorothiazide (Benicar HCT) 40-25 MG per tablet, Take 1 tablet by mouth Daily., Disp: 90 tablet, Rfl: 3    Omega-3 Fatty Acids (FISH OIL) 1000 MG capsule capsule, Take  by mouth Daily With Breakfast., Disp: , Rfl:     tiotropium bromide-olodaterol (STIOLTO RESPIMAT) 2.5-2.5 MCG/ACT aerosol solution inhaler, Inhale Daily., Disp: , Rfl:     zolpidem (AMBIEN) 10 MG tablet, Take 1 tablet by mouth At Night As Needed for Sleep., Disp: , Rfl:     nitroglycerin (NITROSTAT) 0.4 MG SL tablet, 1 under the tongue as needed for angina, may repeat q5mins for up three doses, Disp: 25 tablet, Rfl: 3   Assessment:                Plan:          ICD-10-CM ICD-9-CM   1. Coronary arteriosclerosis in native artery  I25.10 414.01   2. Benign essential hypertension  I10 401.1   3. Anticoagulated  Z79.01 V58.61   4. Hyperlipidemia, unspecified hyperlipidemia type  E78.5 272.4     1. Coronary arteriosclerosis in native artery  No angina pectoris    2. Benign essential hypertension  Blood pressure controlled    3. Anticoagulated  Continue current treatment    4. Hyperlipidemia, unspecified hyperlipidemia type  Continue current treatment      1 yr  COUNSELING:    Mag Alegre was given to patient for the following topics: diagnostic results, risk factor reductions, impressions, risks and benefits  of treatment options and importance of treatment compliance .       SMOKING COUNSELING:        Dictated using Dragon dictation

## 2025-07-09 ENCOUNTER — TELEPHONE (OUTPATIENT)
Dept: CARDIOLOGY | Facility: CLINIC | Age: 80
End: 2025-07-09

## 2025-07-10 ENCOUNTER — TELEPHONE (OUTPATIENT)
Dept: CARDIOLOGY | Facility: CLINIC | Age: 80
End: 2025-07-10

## 2025-07-16 ENCOUNTER — HOSPITAL ENCOUNTER (OUTPATIENT)
Facility: HOSPITAL | Age: 80
Setting detail: OBSERVATION
Discharge: HOME | End: 2025-07-17
Attending: INTERNAL MEDICINE | Admitting: INTERNAL MEDICINE
Payer: MEDICARE

## 2025-07-16 ENCOUNTER — OFFICE VISIT (OUTPATIENT)
Dept: CARDIOLOGY | Facility: CLINIC | Age: 80
End: 2025-07-16
Payer: MEDICARE

## 2025-07-16 VITALS
BODY MASS INDEX: 36.02 KG/M2 | SYSTOLIC BLOOD PRESSURE: 121 MMHG | DIASTOLIC BLOOD PRESSURE: 87 MMHG | HEIGHT: 64 IN | HEART RATE: 125 BPM | WEIGHT: 211 LBS

## 2025-07-16 DIAGNOSIS — I10 BENIGN ESSENTIAL HYPERTENSION: ICD-10-CM

## 2025-07-16 DIAGNOSIS — I25.10 CORONARY ARTERIOSCLEROSIS IN NATIVE ARTERY: Primary | ICD-10-CM

## 2025-07-16 DIAGNOSIS — Z79.01 ANTICOAGULATED: ICD-10-CM

## 2025-07-16 DIAGNOSIS — E78.5 HYPERLIPIDEMIA, UNSPECIFIED HYPERLIPIDEMIA TYPE: ICD-10-CM

## 2025-07-16 DIAGNOSIS — I48.0 PAROXYSMAL ATRIAL FIBRILLATION: ICD-10-CM

## 2025-07-16 LAB
ALBUMIN SERPL-MCNC: 4.3 G/DL (ref 3.5–5.2)
ALBUMIN/GLOB SERPL: 1.5 G/DL
ALP SERPL-CCNC: 96 U/L (ref 39–117)
ALT SERPL W P-5'-P-CCNC: 12 U/L (ref 1–33)
ANION GAP SERPL CALCULATED.3IONS-SCNC: 11.4 MMOL/L (ref 5–15)
APTT PPP: 32.5 SECONDS (ref 22.7–35.4)
AST SERPL-CCNC: 18 U/L (ref 1–32)
BASOPHILS # BLD AUTO: 0.02 10*3/MM3 (ref 0–0.2)
BASOPHILS NFR BLD AUTO: 0.3 % (ref 0–1.5)
BILIRUB SERPL-MCNC: 0.4 MG/DL (ref 0–1.2)
BUN SERPL-MCNC: 13 MG/DL (ref 8–23)
BUN/CREAT SERPL: 14.8 (ref 7–25)
CALCIUM SPEC-SCNC: 10.4 MG/DL (ref 8.6–10.5)
CHLORIDE SERPL-SCNC: 103 MMOL/L (ref 98–107)
CO2 SERPL-SCNC: 28.6 MMOL/L (ref 22–29)
CREAT SERPL-MCNC: 0.88 MG/DL (ref 0.57–1)
DEPRECATED RDW RBC AUTO: 41.8 FL (ref 37–54)
EGFRCR SERPLBLD CKD-EPI 2021: 67.4 ML/MIN/1.73
EOSINOPHIL # BLD AUTO: 0.11 10*3/MM3 (ref 0–0.4)
EOSINOPHIL NFR BLD AUTO: 1.6 % (ref 0.3–6.2)
ERYTHROCYTE [DISTWIDTH] IN BLOOD BY AUTOMATED COUNT: 13.4 % (ref 12.3–15.4)
GLOBULIN UR ELPH-MCNC: 2.9 GM/DL
GLUCOSE SERPL-MCNC: 147 MG/DL (ref 65–99)
HCT VFR BLD AUTO: 42.1 % (ref 34–46.6)
HGB BLD-MCNC: 13.8 G/DL (ref 12–15.9)
IMM GRANULOCYTES # BLD AUTO: 0.03 10*3/MM3 (ref 0–0.05)
IMM GRANULOCYTES NFR BLD AUTO: 0.4 % (ref 0–0.5)
INR PPP: 1.25 (ref 0.9–1.1)
LYMPHOCYTES # BLD AUTO: 1.88 10*3/MM3 (ref 0.7–3.1)
LYMPHOCYTES NFR BLD AUTO: 27 % (ref 19.6–45.3)
MAGNESIUM SERPL-MCNC: 2.1 MG/DL (ref 1.6–2.4)
MCH RBC QN AUTO: 28.5 PG (ref 26.6–33)
MCHC RBC AUTO-ENTMCNC: 32.8 G/DL (ref 31.5–35.7)
MCV RBC AUTO: 87 FL (ref 79–97)
MONOCYTES # BLD AUTO: 0.73 10*3/MM3 (ref 0.1–0.9)
MONOCYTES NFR BLD AUTO: 10.5 % (ref 5–12)
NEUTROPHILS NFR BLD AUTO: 4.2 10*3/MM3 (ref 1.7–7)
NEUTROPHILS NFR BLD AUTO: 60.2 % (ref 42.7–76)
NRBC BLD AUTO-RTO: 0 /100 WBC (ref 0–0.2)
PLATELET # BLD AUTO: 199 10*3/MM3 (ref 140–450)
PMV BLD AUTO: 11.2 FL (ref 6–12)
POTASSIUM SERPL-SCNC: 4.4 MMOL/L (ref 3.5–5.2)
PROT SERPL-MCNC: 7.2 G/DL (ref 6–8.5)
PROTHROMBIN TIME: 15.7 SECONDS (ref 11.7–14.2)
QT INTERVAL: 339 MS
QTC INTERVAL: 496 MS
RBC # BLD AUTO: 4.84 10*6/MM3 (ref 3.77–5.28)
SODIUM SERPL-SCNC: 143 MMOL/L (ref 136–145)
TSH SERPL DL<=0.05 MIU/L-ACNC: 0.68 UIU/ML (ref 0.27–4.2)
WBC NRBC COR # BLD AUTO: 6.97 10*3/MM3 (ref 3.4–10.8)

## 2025-07-16 PROCEDURE — 94640 AIRWAY INHALATION TREATMENT: CPT

## 2025-07-16 PROCEDURE — 96366 THER/PROPH/DIAG IV INF ADDON: CPT

## 2025-07-16 PROCEDURE — 93005 ELECTROCARDIOGRAM TRACING: CPT

## 2025-07-16 PROCEDURE — 85025 COMPLETE CBC W/AUTO DIFF WBC: CPT

## 2025-07-16 PROCEDURE — 85730 THROMBOPLASTIN TIME PARTIAL: CPT

## 2025-07-16 PROCEDURE — 96376 TX/PRO/DX INJ SAME DRUG ADON: CPT

## 2025-07-16 PROCEDURE — 96365 THER/PROPH/DIAG IV INF INIT: CPT

## 2025-07-16 PROCEDURE — 25010000002 AMIODARONE IN DEXTROSE 5% 150-4.21 MG/100ML-% SOLUTION

## 2025-07-16 PROCEDURE — 25010000002 AMIODARONE IN DEXTROSE 5% 360-4.14 MG/200ML-% SOLUTION

## 2025-07-16 PROCEDURE — 83735 ASSAY OF MAGNESIUM: CPT

## 2025-07-16 PROCEDURE — G0378 HOSPITAL OBSERVATION PER HR: HCPCS

## 2025-07-16 PROCEDURE — 80053 COMPREHEN METABOLIC PANEL: CPT

## 2025-07-16 PROCEDURE — 94799 UNLISTED PULMONARY SVC/PX: CPT

## 2025-07-16 PROCEDURE — 85610 PROTHROMBIN TIME: CPT

## 2025-07-16 PROCEDURE — 84443 ASSAY THYROID STIM HORMONE: CPT

## 2025-07-16 RX ORDER — HYDROCHLOROTHIAZIDE 25 MG/1
25 TABLET ORAL
Status: DISCONTINUED | OUTPATIENT
Start: 2025-07-17 | End: 2025-07-17 | Stop reason: HOSPADM

## 2025-07-16 RX ORDER — ARFORMOTEROL TARTRATE 15 UG/2ML
15 SOLUTION RESPIRATORY (INHALATION)
Status: DISCONTINUED | OUTPATIENT
Start: 2025-07-16 | End: 2025-07-17 | Stop reason: HOSPADM

## 2025-07-16 RX ORDER — SODIUM CHLORIDE 9 MG/ML
40 INJECTION, SOLUTION INTRAVENOUS AS NEEDED
Status: DISCONTINUED | OUTPATIENT
Start: 2025-07-16 | End: 2025-07-17 | Stop reason: HOSPADM

## 2025-07-16 RX ORDER — SODIUM CHLORIDE 0.9 % (FLUSH) 0.9 %
10 SYRINGE (ML) INJECTION EVERY 12 HOURS SCHEDULED
Status: DISCONTINUED | OUTPATIENT
Start: 2025-07-16 | End: 2025-07-17 | Stop reason: HOSPADM

## 2025-07-16 RX ORDER — NITROGLYCERIN 0.4 MG/1
0.4 TABLET SUBLINGUAL
Status: DISCONTINUED | OUTPATIENT
Start: 2025-07-16 | End: 2025-07-16

## 2025-07-16 RX ORDER — ASPIRIN 81 MG/1
81 TABLET, CHEWABLE ORAL NIGHTLY
Status: DISCONTINUED | OUTPATIENT
Start: 2025-07-17 | End: 2025-07-17 | Stop reason: HOSPADM

## 2025-07-16 RX ORDER — CYCLOBENZAPRINE HCL 10 MG
10 TABLET ORAL 3 TIMES DAILY PRN
Status: DISCONTINUED | OUTPATIENT
Start: 2025-07-16 | End: 2025-07-17 | Stop reason: HOSPADM

## 2025-07-16 RX ORDER — POLYETHYLENE GLYCOL 3350 17 G/17G
17 POWDER, FOR SOLUTION ORAL DAILY PRN
Status: DISCONTINUED | OUTPATIENT
Start: 2025-07-16 | End: 2025-07-17 | Stop reason: HOSPADM

## 2025-07-16 RX ORDER — CHOLECALCIFEROL (VITAMIN D3) 25 MCG
2000 TABLET ORAL NIGHTLY
Status: DISCONTINUED | OUTPATIENT
Start: 2025-07-16 | End: 2025-07-17 | Stop reason: HOSPADM

## 2025-07-16 RX ORDER — METOPROLOL TARTRATE 25 MG/1
25 TABLET, FILM COATED ORAL EVERY 12 HOURS SCHEDULED
Status: DISCONTINUED | OUTPATIENT
Start: 2025-07-16 | End: 2025-07-17 | Stop reason: HOSPADM

## 2025-07-16 RX ORDER — BISACODYL 10 MG
10 SUPPOSITORY, RECTAL RECTAL DAILY PRN
Status: DISCONTINUED | OUTPATIENT
Start: 2025-07-16 | End: 2025-07-17 | Stop reason: HOSPADM

## 2025-07-16 RX ORDER — AMOXICILLIN 250 MG
2 CAPSULE ORAL 2 TIMES DAILY PRN
Status: DISCONTINUED | OUTPATIENT
Start: 2025-07-16 | End: 2025-07-17 | Stop reason: HOSPADM

## 2025-07-16 RX ORDER — ZOLPIDEM TARTRATE 5 MG/1
10 TABLET ORAL NIGHTLY PRN
Status: DISCONTINUED | OUTPATIENT
Start: 2025-07-16 | End: 2025-07-17 | Stop reason: HOSPADM

## 2025-07-16 RX ORDER — ATORVASTATIN CALCIUM 20 MG/1
20 TABLET, FILM COATED ORAL NIGHTLY
Status: DISCONTINUED | OUTPATIENT
Start: 2025-07-16 | End: 2025-07-17 | Stop reason: HOSPADM

## 2025-07-16 RX ORDER — SODIUM CHLORIDE 0.9 % (FLUSH) 0.9 %
10 SYRINGE (ML) INJECTION AS NEEDED
Status: DISCONTINUED | OUTPATIENT
Start: 2025-07-16 | End: 2025-07-17 | Stop reason: HOSPADM

## 2025-07-16 RX ORDER — CETIRIZINE HYDROCHLORIDE 10 MG/1
10 TABLET ORAL DAILY
Status: DISCONTINUED | OUTPATIENT
Start: 2025-07-16 | End: 2025-07-17 | Stop reason: HOSPADM

## 2025-07-16 RX ORDER — LOSARTAN POTASSIUM 50 MG/1
100 TABLET ORAL
Status: DISCONTINUED | OUTPATIENT
Start: 2025-07-17 | End: 2025-07-17 | Stop reason: HOSPADM

## 2025-07-16 RX ORDER — BISACODYL 5 MG/1
5 TABLET, DELAYED RELEASE ORAL DAILY PRN
Status: DISCONTINUED | OUTPATIENT
Start: 2025-07-16 | End: 2025-07-17 | Stop reason: HOSPADM

## 2025-07-16 RX ADMIN — METOPROLOL TARTRATE 25 MG: 25 TABLET, FILM COATED ORAL at 21:39

## 2025-07-16 RX ADMIN — AMIODARONE HYDROCHLORIDE 150 MG: 1.5 INJECTION, SOLUTION INTRAVENOUS at 21:42

## 2025-07-16 RX ADMIN — CETIRIZINE HYDROCHLORIDE 10 MG: 10 TABLET, FILM COATED ORAL at 21:38

## 2025-07-16 RX ADMIN — ARFORMOTEROL TARTRATE 15 MCG: 15 SOLUTION RESPIRATORY (INHALATION) at 19:20

## 2025-07-16 RX ADMIN — AMIODARONE HYDROCHLORIDE 1 MG/MIN: 1.8 INJECTION, SOLUTION INTRAVENOUS at 21:59

## 2025-07-16 RX ADMIN — APIXABAN 5 MG: 5 TABLET, FILM COATED ORAL at 21:39

## 2025-07-16 RX ADMIN — Medication 10 ML: at 21:53

## 2025-07-16 RX ADMIN — ZOLPIDEM TARTRATE 10 MG: 5 TABLET ORAL at 21:52

## 2025-07-16 RX ADMIN — ATORVASTATIN CALCIUM 20 MG: 20 TABLET, FILM COATED ORAL at 21:38

## 2025-07-16 NOTE — PROGRESS NOTES
[facility]  HAS TAKEN ELIQUIS 5 MG BID FOR THE LAST 7 DAYS   Subjective:        Mag Silva is a 79 y.o. female who here for follow up    CC  Atrial fibrillation  HPI  79-year-old female with coronary atherosclerosis hypertension hyperlipidemia has recurrent atrial fibrillation unfortunately patient has been taking only half the dose of Eliquis     Problems Addressed this Visit          Cardiac and Vasculature    Coronary arteriosclerosis in native artery - Primary    Benign essential hypertension    Hyperlipidemia    Paroxysmal atrial fibrillation       Coag and Thromboembolic    Anticoagulated     Diagnoses         Codes Comments      Coronary arteriosclerosis in native artery    -  Primary ICD-10-CM: I25.10  ICD-9-CM: 414.01       Benign essential hypertension     ICD-10-CM: I10  ICD-9-CM: 401.1       Anticoagulated     ICD-10-CM: Z79.01  ICD-9-CM: V58.61       Hyperlipidemia, unspecified hyperlipidemia type     ICD-10-CM: E78.5  ICD-9-CM: 272.4       Paroxysmal atrial fibrillation     ICD-10-CM: I48.0  ICD-9-CM: 427.31           .    The following portions of the patient's history were reviewed and updated as appropriate: allergies, current medications, past family history, past medical history, past social history, past surgical history and problem list.    Past Medical History:   Diagnosis Date    Atrial fibrillation     Breast cancer     Chest pain     COPD (chronic obstructive pulmonary disease)     Coronary artery disease     Crescendo angina     Dyslipidemia     Hypertension     PVC (premature ventricular contraction)     Syncope      reports that she quit smoking about 21 years ago. Her smoking use included cigarettes. She started smoking about 41 years ago. She has a 30 pack-year smoking history. She has been exposed to tobacco smoke. She has never used smokeless tobacco. She reports that she does not drink alcohol and does not use drugs.   Family History   Problem Relation Age of Onset    Stroke Mother   "   Asthma Father     Cancer Father        Review of Systems  Constitutional: No wt loss, fever, fatigue  Gastrointestinal: No nausea, abdominal pain  Behavioral/Psych: No insomnia or anxiety   Cardiovascular shortness of breath and palpitation  Objective:       Physical Exam  /87   Pulse (!) 125   Ht 162.6 cm (64\")   Wt 95.7 kg (211 lb)   BMI 36.22 kg/m²   General appearance: No acute changes   Neck: Trachea midline; NECK, supple, no thyromegaly or lymphadenopathy   Lungs: Normal size and shape, normal breath sounds, equal distribution of air, no rales and rhonchi   CV: S1-S2 regular, no murmurs, no rub, no gallop   Abdomen: Soft, nontender; no masses , no abnormal abdominal sounds   Extremities: No deformity , normal color , no peripheral edema   Skin: Normal temperature, turgor and texture; no rash, ulcers            ECG 12 Lead    Date/Time: 7/16/2025 1:45 PM  Performed by: Tony Ayala MD    Authorized by: Tony Ayala MD  Comparison: compared with previous ECG   Comparison to previous ECG: A FLUTTER IS NEW  Rhythm: atrial flutter    Clinical impression: abnormal EKG            Echocardiogram:    Results for orders placed in visit on 11/09/23    Adult Transthoracic Echo Complete W/ Cont if Necessary Per Protocol    Interpretation Summary    Left ventricular ejection fraction appears to be 66 - 70%.    Left ventricular diastolic function was normal.    Estimated right ventricular systolic pressure from tricuspid regurgitation is mildly elevated (35-45 mmHg).          Current Outpatient Medications:     Acetaminophen (TYLENOL 8 HOUR ARTHRITIS PAIN PO), Take  by mouth., Disp: , Rfl:     anastrozole (ARIMIDEX) 1 MG tablet, , Disp: , Rfl:     aspirin 81 MG chewable tablet, Chew 1 tablet Every Night., Disp: , Rfl:     atorvastatin (LIPITOR) 20 MG tablet, Take 1 tablet by mouth Every Night., Disp: , Rfl:     Cholecalciferol (VITAMIN D3) 2000 UNITS capsule, Take 1 capsule by mouth Every " Night., Disp: , Rfl:     cyclobenzaprine (FLEXERIL) 10 MG tablet, 1 tablet., Disp: , Rfl:     levocetirizine (XYZAL) 5 MG tablet, Take 1 tablet by mouth Daily., Disp: , Rfl:     Multiple Vitamins-Minerals (MULTI COMPLETE PO), Take  by mouth., Disp: , Rfl:     nitroglycerin (NITROSTAT) 0.4 MG SL tablet, 1 under the tongue as needed for angina, may repeat q5mins for up three doses, Disp: 25 tablet, Rfl: 3    olmesartan-hydrochlorothiazide (Benicar HCT) 40-25 MG per tablet, Take 1 tablet by mouth Daily., Disp: 90 tablet, Rfl: 3    Omega-3 Fatty Acids (FISH OIL) 1000 MG capsule capsule, Take  by mouth Daily With Breakfast., Disp: , Rfl:     tiotropium bromide-olodaterol (STIOLTO RESPIMAT) 2.5-2.5 MCG/ACT aerosol solution inhaler, Inhale Daily., Disp: , Rfl:     zolpidem (AMBIEN) 10 MG tablet, Take 1 tablet by mouth At Night As Needed for Sleep., Disp: , Rfl:     amiodarone (PACERONE) 200 MG tablet, Take 1 tablet by mouth 2 (Two) Times a Day With Meals., Disp: 60 tablet, Rfl: 11    apixaban (ELIQUIS) 5 MG tablet tablet, Take 1 tablet by mouth Every 12 (Twelve) Hours. Indications: Atrial Fibrillation, Disp: 180 tablet, Rfl: 3    metoprolol tartrate (LOPRESSOR) 25 MG tablet, Take 0.5 tablets by mouth Every 12 (Twelve) Hours., Disp: 90 tablet, Rfl: 3   Assessment:                Plan:          ICD-10-CM ICD-9-CM   1. Coronary arteriosclerosis in native artery  I25.10 414.01   2. Benign essential hypertension  I10 401.1   3. Anticoagulated  Z79.01 V58.61   4. Hyperlipidemia, unspecified hyperlipidemia type  E78.5 272.4   5. Paroxysmal atrial fibrillation  I48.0 427.31     1. Coronary arteriosclerosis in native artery  No angina pectoris    2. Benign essential hypertension  Blood pressure control    3. Anticoagulated  Continue Eliquis 5 twice daily    4. Hyperlipidemia, unspecified hyperlipidemia type  Continue current medication    5.  Atrial fibrillation  Recurrent atrial fibrillation highly symptomatic we will admit and  proceed with a VAUGHN cardioversion  ADMIT  SYMPTOMATIC A FLUTTER WITH CP  IV AMIO   VAUGHN, CV  COUNSELING:    Mag Alegre was given to patient for the following topics: diagnostic results, risk factor reductions, impressions, risks and benefits of treatment options and importance of treatment compliance .       SMOKING COUNSELING:        Dictated using Dragon dictation

## 2025-07-17 ENCOUNTER — ANESTHESIA EVENT (OUTPATIENT)
Dept: POSTOP/PACU | Facility: HOSPITAL | Age: 80
End: 2025-07-17
Payer: MEDICARE

## 2025-07-17 ENCOUNTER — APPOINTMENT (OUTPATIENT)
Dept: POSTOP/PACU | Facility: HOSPITAL | Age: 80
End: 2025-07-17
Payer: MEDICARE

## 2025-07-17 ENCOUNTER — ANESTHESIA (OUTPATIENT)
Dept: POSTOP/PACU | Facility: HOSPITAL | Age: 80
End: 2025-07-17
Payer: MEDICARE

## 2025-07-17 VITALS
OXYGEN SATURATION: 95 % | BODY MASS INDEX: 36.02 KG/M2 | SYSTOLIC BLOOD PRESSURE: 124 MMHG | WEIGHT: 211 LBS | HEIGHT: 64 IN | DIASTOLIC BLOOD PRESSURE: 54 MMHG | HEART RATE: 65 BPM | TEMPERATURE: 98.1 F | RESPIRATION RATE: 16 BRPM

## 2025-07-17 VITALS — DIASTOLIC BLOOD PRESSURE: 64 MMHG | HEART RATE: 57 BPM | SYSTOLIC BLOOD PRESSURE: 107 MMHG | OXYGEN SATURATION: 97 %

## 2025-07-17 PROBLEM — I48.91 ATRIAL FIBRILLATION: Status: ACTIVE | Noted: 2025-07-17

## 2025-07-17 LAB
ALBUMIN SERPL-MCNC: 3.6 G/DL (ref 3.5–5.2)
ALBUMIN/GLOB SERPL: 1.3 G/DL
ALP SERPL-CCNC: 81 U/L (ref 39–117)
ALT SERPL W P-5'-P-CCNC: 9 U/L (ref 1–33)
ANION GAP SERPL CALCULATED.3IONS-SCNC: 12.9 MMOL/L (ref 5–15)
AST SERPL-CCNC: 17 U/L (ref 1–32)
BH CV ECHO MEAS - RVSP: 37 MMHG
BH CV ECHO MEAS - TR MAX PG: 29 MMHG
BH CV ECHO SHUNT ASSESSMENT PERFORMED (HIDDEN SCRIPTING): 1
BILIRUB SERPL-MCNC: 0.3 MG/DL (ref 0–1.2)
BUN SERPL-MCNC: 12 MG/DL (ref 8–23)
BUN/CREAT SERPL: 17.1 (ref 7–25)
CALCIUM SPEC-SCNC: 9.3 MG/DL (ref 8.6–10.5)
CHLORIDE SERPL-SCNC: 105 MMOL/L (ref 98–107)
CHOLEST SERPL-MCNC: 97 MG/DL (ref 0–200)
CO2 SERPL-SCNC: 23.1 MMOL/L (ref 22–29)
CREAT SERPL-MCNC: 0.7 MG/DL (ref 0.57–1)
EGFRCR SERPLBLD CKD-EPI 2021: 88.7 ML/MIN/1.73
GLOBULIN UR ELPH-MCNC: 2.7 GM/DL
GLUCOSE SERPL-MCNC: 115 MG/DL (ref 65–99)
HDLC SERPL-MCNC: 31 MG/DL (ref 40–60)
LDLC SERPL CALC-MCNC: 38 MG/DL (ref 0–100)
LDLC/HDLC SERPL: 1.04 {RATIO}
MAGNESIUM SERPL-MCNC: 2 MG/DL (ref 1.6–2.4)
POTASSIUM SERPL-SCNC: 3.5 MMOL/L (ref 3.5–5.2)
POTASSIUM SERPL-SCNC: 4.3 MMOL/L (ref 3.5–5.2)
PROT SERPL-MCNC: 6.3 G/DL (ref 6–8.5)
QT INTERVAL: 492 MS
QTC INTERVAL: 481 MS
SINUS: 3 CM
SODIUM SERPL-SCNC: 141 MMOL/L (ref 136–145)
STJ: 2.35 CM
TRIGL SERPL-MCNC: 169 MG/DL (ref 0–150)
VLDLC SERPL-MCNC: 28 MG/DL (ref 5–40)

## 2025-07-17 PROCEDURE — 93312 ECHO TRANSESOPHAGEAL: CPT | Performed by: STUDENT IN AN ORGANIZED HEALTH CARE EDUCATION/TRAINING PROGRAM

## 2025-07-17 PROCEDURE — 83735 ASSAY OF MAGNESIUM: CPT

## 2025-07-17 PROCEDURE — 63710000001 REVEFENACIN 175 MCG/3ML SOLUTION: Performed by: INTERNAL MEDICINE

## 2025-07-17 PROCEDURE — G0378 HOSPITAL OBSERVATION PER HR: HCPCS

## 2025-07-17 PROCEDURE — 94799 UNLISTED PULMONARY SVC/PX: CPT

## 2025-07-17 PROCEDURE — 80061 LIPID PANEL: CPT

## 2025-07-17 PROCEDURE — 93320 DOPPLER ECHO COMPLETE: CPT | Performed by: STUDENT IN AN ORGANIZED HEALTH CARE EDUCATION/TRAINING PROGRAM

## 2025-07-17 PROCEDURE — 84132 ASSAY OF SERUM POTASSIUM: CPT | Performed by: INTERNAL MEDICINE

## 2025-07-17 PROCEDURE — 93005 ELECTROCARDIOGRAM TRACING: CPT | Performed by: STUDENT IN AN ORGANIZED HEALTH CARE EDUCATION/TRAINING PROGRAM

## 2025-07-17 PROCEDURE — 25010000002 PROPOFOL 10 MG/ML EMULSION: Performed by: NURSE ANESTHETIST, CERTIFIED REGISTERED

## 2025-07-17 PROCEDURE — 92960 CARDIOVERSION ELECTRIC EXT: CPT

## 2025-07-17 PROCEDURE — 25010000002 AMIODARONE IN DEXTROSE 5% 360-4.14 MG/200ML-% SOLUTION

## 2025-07-17 PROCEDURE — 93320 DOPPLER ECHO COMPLETE: CPT

## 2025-07-17 PROCEDURE — 93325 DOPPLER ECHO COLOR FLOW MAPG: CPT | Performed by: STUDENT IN AN ORGANIZED HEALTH CARE EDUCATION/TRAINING PROGRAM

## 2025-07-17 PROCEDURE — 80053 COMPREHEN METABOLIC PANEL: CPT

## 2025-07-17 PROCEDURE — 96366 THER/PROPH/DIAG IV INF ADDON: CPT

## 2025-07-17 PROCEDURE — 94761 N-INVAS EAR/PLS OXIMETRY MLT: CPT

## 2025-07-17 PROCEDURE — 25810000003 LACTATED RINGERS PER 1000 ML: Performed by: NURSE ANESTHETIST, CERTIFIED REGISTERED

## 2025-07-17 PROCEDURE — 93312 ECHO TRANSESOPHAGEAL: CPT

## 2025-07-17 PROCEDURE — 93010 ELECTROCARDIOGRAM REPORT: CPT | Performed by: INTERNAL MEDICINE

## 2025-07-17 PROCEDURE — 25010000002 LIDOCAINE 2% SOLUTION: Performed by: NURSE ANESTHETIST, CERTIFIED REGISTERED

## 2025-07-17 PROCEDURE — 93325 DOPPLER ECHO COLOR FLOW MAPG: CPT

## 2025-07-17 PROCEDURE — 92960 CARDIOVERSION ELECTRIC EXT: CPT | Performed by: STUDENT IN AN ORGANIZED HEALTH CARE EDUCATION/TRAINING PROGRAM

## 2025-07-17 RX ORDER — AMIODARONE HYDROCHLORIDE 200 MG/1
200 TABLET ORAL 2 TIMES DAILY WITH MEALS
Status: DISCONTINUED | OUTPATIENT
Start: 2025-07-17 | End: 2025-07-17 | Stop reason: HOSPADM

## 2025-07-17 RX ORDER — FAMOTIDINE 10 MG/ML
20 INJECTION, SOLUTION INTRAVENOUS ONCE
Status: CANCELLED | OUTPATIENT
Start: 2025-07-17 | End: 2025-07-17

## 2025-07-17 RX ORDER — SODIUM CHLORIDE 0.9 % (FLUSH) 0.9 %
3 SYRINGE (ML) INJECTION EVERY 12 HOURS SCHEDULED
Status: CANCELLED | OUTPATIENT
Start: 2025-07-17

## 2025-07-17 RX ORDER — AMIODARONE HYDROCHLORIDE 200 MG/1
200 TABLET ORAL 2 TIMES DAILY WITH MEALS
Qty: 60 TABLET | Refills: 11 | Status: SHIPPED | OUTPATIENT
Start: 2025-07-17

## 2025-07-17 RX ORDER — MIDAZOLAM HYDROCHLORIDE 1 MG/ML
0.5 INJECTION, SOLUTION INTRAMUSCULAR; INTRAVENOUS
Status: CANCELLED | OUTPATIENT
Start: 2025-07-17

## 2025-07-17 RX ORDER — DROPERIDOL 2.5 MG/ML
0.62 INJECTION, SOLUTION INTRAMUSCULAR; INTRAVENOUS
Status: DISCONTINUED | OUTPATIENT
Start: 2025-07-17 | End: 2025-07-17 | Stop reason: HOSPADM

## 2025-07-17 RX ORDER — METOPROLOL TARTRATE 25 MG/1
12.5 TABLET, FILM COATED ORAL EVERY 12 HOURS SCHEDULED
Qty: 90 TABLET | Refills: 3 | Status: SHIPPED | OUTPATIENT
Start: 2025-07-17

## 2025-07-17 RX ORDER — PROMETHAZINE HYDROCHLORIDE 25 MG/1
25 SUPPOSITORY RECTAL ONCE AS NEEDED
Status: DISCONTINUED | OUTPATIENT
Start: 2025-07-17 | End: 2025-07-17 | Stop reason: HOSPADM

## 2025-07-17 RX ORDER — SODIUM CHLORIDE, SODIUM LACTATE, POTASSIUM CHLORIDE, CALCIUM CHLORIDE 600; 310; 30; 20 MG/100ML; MG/100ML; MG/100ML; MG/100ML
INJECTION, SOLUTION INTRAVENOUS CONTINUOUS PRN
Status: DISCONTINUED | OUTPATIENT
Start: 2025-07-17 | End: 2025-07-17 | Stop reason: SURG

## 2025-07-17 RX ORDER — SODIUM CHLORIDE 0.9 % (FLUSH) 0.9 %
3-10 SYRINGE (ML) INJECTION AS NEEDED
Status: CANCELLED | OUTPATIENT
Start: 2025-07-17

## 2025-07-17 RX ORDER — SODIUM CHLORIDE, SODIUM LACTATE, POTASSIUM CHLORIDE, CALCIUM CHLORIDE 600; 310; 30; 20 MG/100ML; MG/100ML; MG/100ML; MG/100ML
9 INJECTION, SOLUTION INTRAVENOUS CONTINUOUS
Status: CANCELLED | OUTPATIENT
Start: 2025-07-17 | End: 2025-07-18

## 2025-07-17 RX ORDER — PROMETHAZINE HYDROCHLORIDE 25 MG/1
25 TABLET ORAL ONCE AS NEEDED
Status: DISCONTINUED | OUTPATIENT
Start: 2025-07-17 | End: 2025-07-17 | Stop reason: HOSPADM

## 2025-07-17 RX ORDER — DIPHENHYDRAMINE HYDROCHLORIDE 50 MG/ML
12.5 INJECTION, SOLUTION INTRAMUSCULAR; INTRAVENOUS
Status: DISCONTINUED | OUTPATIENT
Start: 2025-07-17 | End: 2025-07-17 | Stop reason: HOSPADM

## 2025-07-17 RX ORDER — LIDOCAINE HYDROCHLORIDE 10 MG/ML
0.5 INJECTION, SOLUTION INFILTRATION; PERINEURAL ONCE AS NEEDED
Status: CANCELLED | OUTPATIENT
Start: 2025-07-17

## 2025-07-17 RX ORDER — POTASSIUM CHLORIDE 1500 MG/1
40 TABLET, EXTENDED RELEASE ORAL EVERY 4 HOURS
Status: COMPLETED | OUTPATIENT
Start: 2025-07-17 | End: 2025-07-17

## 2025-07-17 RX ORDER — PANTOPRAZOLE SODIUM 40 MG/1
40 TABLET, DELAYED RELEASE ORAL
Status: DISCONTINUED | OUTPATIENT
Start: 2025-07-18 | End: 2025-07-17 | Stop reason: HOSPADM

## 2025-07-17 RX ORDER — PROPOFOL 10 MG/ML
VIAL (ML) INTRAVENOUS AS NEEDED
Status: DISCONTINUED | OUTPATIENT
Start: 2025-07-17 | End: 2025-07-17 | Stop reason: SURG

## 2025-07-17 RX ORDER — FLUMAZENIL 0.1 MG/ML
0.2 INJECTION INTRAVENOUS AS NEEDED
Status: DISCONTINUED | OUTPATIENT
Start: 2025-07-17 | End: 2025-07-17 | Stop reason: HOSPADM

## 2025-07-17 RX ORDER — LIDOCAINE HYDROCHLORIDE 20 MG/ML
INJECTION, SOLUTION INFILTRATION; PERINEURAL AS NEEDED
Status: DISCONTINUED | OUTPATIENT
Start: 2025-07-17 | End: 2025-07-17 | Stop reason: SURG

## 2025-07-17 RX ORDER — NALOXONE HCL 0.4 MG/ML
0.2 VIAL (ML) INJECTION AS NEEDED
Status: DISCONTINUED | OUTPATIENT
Start: 2025-07-17 | End: 2025-07-17 | Stop reason: HOSPADM

## 2025-07-17 RX ORDER — FENTANYL CITRATE 50 UG/ML
50 INJECTION, SOLUTION INTRAMUSCULAR; INTRAVENOUS ONCE AS NEEDED
Status: CANCELLED | OUTPATIENT
Start: 2025-07-17

## 2025-07-17 RX ORDER — ONDANSETRON 2 MG/ML
4 INJECTION INTRAMUSCULAR; INTRAVENOUS ONCE AS NEEDED
Status: DISCONTINUED | OUTPATIENT
Start: 2025-07-17 | End: 2025-07-17 | Stop reason: HOSPADM

## 2025-07-17 RX ADMIN — CETIRIZINE HYDROCHLORIDE 10 MG: 10 TABLET, FILM COATED ORAL at 11:30

## 2025-07-17 RX ADMIN — APIXABAN 5 MG: 5 TABLET, FILM COATED ORAL at 11:30

## 2025-07-17 RX ADMIN — LOSARTAN POTASSIUM 100 MG: 50 TABLET, FILM COATED ORAL at 11:30

## 2025-07-17 RX ADMIN — POTASSIUM CHLORIDE 40 MEQ: 1500 TABLET, EXTENDED RELEASE ORAL at 11:30

## 2025-07-17 RX ADMIN — LIDOCAINE HYDROCHLORIDE 80 MG: 20 INJECTION, SOLUTION INFILTRATION; PERINEURAL at 10:10

## 2025-07-17 RX ADMIN — SODIUM CHLORIDE, SODIUM LACTATE, POTASSIUM CHLORIDE, AND CALCIUM CHLORIDE: 600; 310; 30; 20 INJECTION, SOLUTION INTRAVENOUS at 10:06

## 2025-07-17 RX ADMIN — PROPOFOL 140 MCG/KG/MIN: 10 INJECTION, EMULSION INTRAVENOUS at 10:10

## 2025-07-17 RX ADMIN — POTASSIUM CHLORIDE 40 MEQ: 1500 TABLET, EXTENDED RELEASE ORAL at 05:41

## 2025-07-17 RX ADMIN — AMIODARONE HYDROCHLORIDE 0.5 MG/MIN: 1.8 INJECTION, SOLUTION INTRAVENOUS at 04:01

## 2025-07-17 RX ADMIN — AMIODARONE HYDROCHLORIDE 200 MG: 200 TABLET ORAL at 16:19

## 2025-07-17 RX ADMIN — HYDROCHLOROTHIAZIDE 25 MG: 25 TABLET ORAL at 11:30

## 2025-07-17 RX ADMIN — REVEFENACIN 175 MCG: 175 SOLUTION RESPIRATORY (INHALATION) at 07:35

## 2025-07-17 RX ADMIN — ARFORMOTEROL TARTRATE 15 MCG: 15 SOLUTION RESPIRATORY (INHALATION) at 07:34

## 2025-07-17 NOTE — ANESTHESIA PREPROCEDURE EVALUATION
Anesthesia Evaluation     no history of anesthetic complications:                Airway   Mallampati: II  TM distance: >3 FB  Neck ROM: limited  Dental    (+) upper dentures    Pulmonary    (+) a smoker Former, COPD,shortness of breath  (-) recent URI  Cardiovascular     (+) hypertension, CAD, dysrhythmias Atrial Fib, PVC, angina, hyperlipidemia      Neuro/Psych  (+) syncope  (-) seizures, dizziness/light headedness  GI/Hepatic/Renal/Endo    (-) diabetes    Musculoskeletal     Abdominal    Substance History      OB/GYN          Other                      Anesthesia Plan    ASA 3     general     intravenous induction     Anesthetic plan, risks, benefits, and alternatives have been provided, discussed and informed consent has been obtained with: patient.    CODE STATUS:

## 2025-07-17 NOTE — CONSULTS
Internal medicine consult    Referring physician  Dr. KENNEY     Chief complaint  Admitted for cardioversion    Reason for consult  Follow medical problems    History of present illness  79-year-old white female with history of atrial fibrillation coronary artery disease hypertension hyperlipidemia COPD and gastroesophageal reflux disease admitted to cardiology service for cardioversion.  I am asked to follow patient for medical problems.  At the time of examination patient has complaint of occasional palpitation but denies any chest pain shortness of breath.  Patient also has back pain but denies any fever cough congestion night sweats weight loss or weight gain.    PAST MEDICAL HISTORY           COPD       Coronary artery disease       Gastroesophageal reflux disease       Hypertension       Hyperlipidemia         PAST SURGICAL HISTORY                        Procedure Laterality Date    BACK SURGERY        CARDIAC CATHETERIZATION        CAROTID STENT        HYSTERECTOMY        NECK SURGERY              FAMILY HISTORY                           Problem Relation Age of Onset     Stroke Mother       Asthma Father       Cancer Father         SOCIAL HISTORY                           Socioeconomic History    Marital status:        Spouse name: Not on file    Number of children: Not on file    Years of education: Not on file    Highest education level: Not on file   Tobacco Use    Smoking status: Former Smoker       Packs/day: 1.50       Years: 20.00       Pack years: 30.00       Types: Cigarettes       Quit date: 2004       Years since quittin.5   Substance and Sexual Activity    Alcohol use: No    Drug use: No    Sexual activity: Never          ALLERGIES  Theophyllines  Home medications reviewed     REVIEW OF SYSTEMS  Constitutional: Negative for fever  HENT:  Fullness in throat  Eyes: Negative.    Respiratory: Negative.  Negative for cough.    Cardiovascular: Positive for palpitations. Negative for chest  "pain.   Gastrointestinal: Negative.    Genitourinary: Negative.  Negative for dysuria.   Musculoskeletal: Positive for back pain   Skin: Negative.  Negative for rash.   Neurological: Negative.  Negative for headaches.   All other systems reviewed and are negative.     PHYSICAL EXAM  Blood pressure 124/54, pulse 63, temperature 98.1 °F (36.7 °C), temperature source Oral, resp. rate 16, height 162 cm (63.78\"), weight 95.7 kg (211 lb), SpO2 100%.      GENERAL: Alert female in no obvious distress  HENT: nares patent  EYES: no scleral icterus  CV: Irregular irregular and tachycardic  RESPIRATORY: normal effort, clear to auscultation bilaterally  ABDOMEN: soft, nontender bowel sounds positive  MUSCULOSKELETAL: no deformity, mild bilateral swelling, no significant or is palpation  NEURO: Strength, sensation, and coordination are grossly intact.  Speech and mentation are unremarkable  SKIN: warm, dry-no unusual rashes are noted     LAB RESULTS  Lab Results (last 24 hours)       Procedure Component Value Units Date/Time    Comprehensive Metabolic Panel [[id number]]  (Abnormal) Collected: 07/17/25 0329    Specimen: Blood Updated: 07/17/25 0452     Glucose 115 mg/dL      BUN 12.0 mg/dL      Creatinine 0.70 mg/dL      Sodium 141 mmol/L      Potassium 3.5 mmol/L      Chloride 105 mmol/L      CO2 23.1 mmol/L      Calcium 9.3 mg/dL      Total Protein 6.3 g/dL      Albumin 3.6 g/dL      ALT (SGPT) 9 U/L      AST (SGOT) 17 U/L      Alkaline Phosphatase 81 U/L      Total Bilirubin 0.3 mg/dL      Globulin 2.7 gm/dL      A/G Ratio 1.3 g/dL      BUN/Creatinine Ratio 17.1     Anion Gap 12.9 mmol/L      eGFR 88.7 mL/min/1.73     Narrative:      GFR Categories in Chronic Kidney Disease (CKD)              GFR Category          GFR (mL/min/1.73)    Interpretation  G1                    90 or greater        Normal or high (1)  G2                    60-89                Mild decrease (1)  G3a                   45-59                Mild to " moderate decrease  G3b                   30-44                Moderate to severe decrease  G4                    15-29                Severe decrease  G5                    14 or less           Kidney failure    (1)In the absence of evidence of kidney disease, neither GFR category G1 or G2 fulfill the criteria for CKD.    eGFR calculation 2021 CKD-EPI creatinine equation, which does not include race as a factor    Magnesium [[id number]]  (Normal) Collected: 07/17/25 0329    Specimen: Blood Updated: 07/17/25 0452     Magnesium 2.0 mg/dL     Lipid Panel [[id number]]  (Abnormal) Collected: 07/17/25 0329    Specimen: Blood Updated: 07/17/25 0450     Total Cholesterol 97 mg/dL      Triglycerides 169 mg/dL      HDL Cholesterol 31 mg/dL      LDL Cholesterol  38 mg/dL      VLDL Cholesterol 28 mg/dL      LDL/HDL Ratio 1.04    Narrative:      Cholesterol Reference Ranges  (U.S. Department of Health and Human Services ATP III Classifications)    Desirable          <200 mg/dL  Borderline High    200-239 mg/dL  High Risk          >240 mg/dL      Triglyceride Reference Ranges  (U.S. Department of Health and Human Services ATP III Classifications)    Normal           <150 mg/dL  Borderline High  150-199 mg/dL  High             200-499 mg/dL  Very High        >500 mg/dL    HDL Reference Ranges  (U.S. Department of Health and Human Services ATP III Classifications)    Low     <40 mg/dl (major risk factor for CHD)  High    >60 mg/dl ('negative' risk factor for CHD)        LDL Reference Ranges  (U.S. Department of Health and Human Services ATP III Classifications)    Optimal          <100 mg/dL  Near Optimal     100-129 mg/dL  Borderline High  130-159 mg/dL  High             160-189 mg/dL  Very High        >189 mg/dL    LDL is calculated using the NIH LDL-C calculation.      Comprehensive Metabolic Panel [[id number]]  (Abnormal) Collected: 07/16/25 1655    Specimen: Blood Updated: 07/16/25 1827     Glucose 147 mg/dL      BUN 13.0 mg/dL       Creatinine 0.88 mg/dL      Sodium 143 mmol/L      Potassium 4.4 mmol/L      Chloride 103 mmol/L      CO2 28.6 mmol/L      Calcium 10.4 mg/dL      Total Protein 7.2 g/dL      Albumin 4.3 g/dL      ALT (SGPT) 12 U/L      AST (SGOT) 18 U/L      Alkaline Phosphatase 96 U/L      Total Bilirubin 0.4 mg/dL      Globulin 2.9 gm/dL      A/G Ratio 1.5 g/dL      BUN/Creatinine Ratio 14.8     Anion Gap 11.4 mmol/L      eGFR 67.4 mL/min/1.73     Narrative:      GFR Categories in Chronic Kidney Disease (CKD)              GFR Category          GFR (mL/min/1.73)    Interpretation  G1                    90 or greater        Normal or high (1)  G2                    60-89                Mild decrease (1)  G3a                   45-59                Mild to moderate decrease  G3b                   30-44                Moderate to severe decrease  G4                    15-29                Severe decrease  G5                    14 or less           Kidney failure    (1)In the absence of evidence of kidney disease, neither GFR category G1 or G2 fulfill the criteria for CKD.    eGFR calculation 2021 CKD-EPI creatinine equation, which does not include race as a factor    Magnesium [[id number]]  (Normal) Collected: 07/16/25 1655    Specimen: Blood Updated: 07/16/25 1827     Magnesium 2.1 mg/dL     TSH Rfx On Abnormal To Free T4 [[id number]]  (Normal) Collected: 07/16/25 1655    Specimen: Blood Updated: 07/16/25 1827     TSH 0.681 uIU/mL     Protime-INR [[id number]]  (Abnormal) Collected: 07/16/25 1655    Specimen: Blood from Arm, Right Updated: 07/16/25 1744     Protime 15.7 Seconds      INR 1.25    aPTT [[id number]]  (Normal) Collected: 07/16/25 1655    Specimen: Blood from Arm, Right Updated: 07/16/25 1744     PTT 32.5 seconds     CBC Auto Differential [[id number]]  (Normal) Collected: 07/16/25 1655    Specimen: Blood Updated: 07/16/25 1736     WBC 6.97 10*3/mm3      RBC 4.84 10*6/mm3      Hemoglobin 13.8 g/dL      Hematocrit 42.1 %       MCV 87.0 fL      MCH 28.5 pg      MCHC 32.8 g/dL      RDW 13.4 %      RDW-SD 41.8 fl      MPV 11.2 fL      Platelets 199 10*3/mm3      Neutrophil % 60.2 %      Lymphocyte % 27.0 %      Monocyte % 10.5 %      Eosinophil % 1.6 %      Basophil % 0.3 %      Immature Grans % 0.4 %      Neutrophils, Absolute 4.20 10*3/mm3      Lymphocytes, Absolute 1.88 10*3/mm3      Monocytes, Absolute 0.73 10*3/mm3      Eosinophils, Absolute 0.11 10*3/mm3      Basophils, Absolute 0.02 10*3/mm3      Immature Grans, Absolute 0.03 10*3/mm3      nRBC 0.0 /100 WBC           Imaging Results (Last 24 Hours)       ** No results found for the last 24 hours. **          Scan on 2025 1657 by [employer], [place]: ECG 12-LEAD         Author: -- Service: -- Author Type: --   Filed: Date of Service: Creation Time:   Status: (Other)   HEART CEZH=580  bpm  RR Kdsmxkuo=492  ms  OR Aonixxqv=117  ms  P Horizontal Axis=14  deg  P Front Axis=60  deg  QRSD Interval=87  ms  QT Sctabsjs=869  ms  TRfR=976  ms  QRS Axis=-12  deg  T Wave Axis=-58  deg  - ABNORMAL ECG -  Sinus tachycardia  RSR' in V1 or V2, right VCD or RVH  Inferior infarct, old          Current Facility-Administered Medications:     [COMPLETED] amiodarone 150 mg in 100 mL D5W (loading dose), 150 mg, Intravenous, Once, Stopped at 259 **FOLLOWED BY** [] amiodarone 360 mg in 200 mL D5W infusion, 1 mg/min, Intravenous, Continuous, Stopped at 25 **FOLLOWED BY** amiodarone 360 mg in 200 mL D5W infusion, 0.5 mg/min, Intravenous, Continuous, Anne Lizama APRN, Last Rate: 16.67 mL/hr at 25, 0.5 mg/min at 25 040    apixaban (ELIQUIS) tablet 5 mg, 5 mg, Oral, Q12H, Tony Ayala MD, 5 mg at 25 1130    arformoterol (BROVANA) nebulizer solution 15 mcg, 15 mcg, Nebulization, BID - RT, 15 mcg at 25 0734 **AND** revefenacin (YUPELRI) nebulizer solution 175 mcg, 175 mcg, Nebulization, Daily - RT, Tony Ayala MD, 175 mcg at  07/17/25 0735    aspirin chewable tablet 81 mg, 81 mg, Oral, Nightly, Tony Ayala MD    atorvastatin (LIPITOR) tablet 20 mg, 20 mg, Oral, Nightly, Tony Ayala MD, 20 mg at 07/16/25 2138    sennosides-docusate (PERICOLACE) 8.6-50 MG per tablet 2 tablet, 2 tablet, Oral, BID PRN **AND** polyethylene glycol (MIRALAX) packet 17 g, 17 g, Oral, Daily PRN **AND** bisacodyl (DULCOLAX) EC tablet 5 mg, 5 mg, Oral, Daily PRN **AND** bisacodyl (DULCOLAX) suppository 10 mg, 10 mg, Rectal, Daily PRN, Anne Lizama APRN    Calcium Replacement - Follow Nurse / BPA Driven Protocol, , Not Applicable, PRN, Anne Lizama APRKIN    cetirizine (zyrTEC) tablet 10 mg, 10 mg, Oral, Daily, Tony Ayala MD, 10 mg at 07/17/25 1130    cholecalciferol (VITAMIN D3) tablet 2,000 Units, 2,000 Units, Oral, Nightly, Tony Ayala MD    cyclobenzaprine (FLEXERIL) tablet 10 mg, 10 mg, Oral, TID PRN, Tony Ayala MD    losartan (COZAAR) tablet 100 mg, 100 mg, Oral, Q24H, 100 mg at 07/17/25 1130 **AND** hydroCHLOROthiazide tablet 25 mg, 25 mg, Oral, Q24H, Tony Ayala MD, 25 mg at 07/17/25 1130    Magnesium Cardiology Dose Replacement - Follow Nurse / BPA Driven Protocol, , Not Applicable, PRN, Anne Lizama APRN    metoprolol tartrate (LOPRESSOR) tablet 25 mg, 25 mg, Oral, Q12H, Tony Ayala MD, 25 mg at 07/16/25 2139    Phosphorus Replacement - Follow Nurse / BPA Driven Protocol, , Not Applicable, PRN, Anne Lizama APRN    Potassium Replacement - Follow Nurse / BPA Driven Protocol, , Not Applicable, PRN, Anne Lizama, APRN    sodium chloride 0.9 % flush 10 mL, 10 mL, Intravenous, Q12H, Anne Lizama APRN, 10 mL at 07/16/25 2153    sodium chloride 0.9 % flush 10 mL, 10 mL, Intravenous, PRN, Anne Lizama, APRN    sodium chloride 0.9 % infusion 40 mL, 40 mL, Intravenous, PRN, Anne Lizama, APRN    zolpidem (AMBIEN) tablet 10 mg, 10 mg, Oral, Nightly PRN,  Tony Ayala MD, 10 mg at 07/16/25 5817     ASSESSMENT  Paroxysmal atrial fibrillation admit for cardioversion  Coronary artery disease  Hypertension   Hyperlipidemia  COPD  Gastroesophageal reflux disease    PLAN  Agree with current care  IV amiodarone per cardiology  Continue anticoagulation  Continue home medications  Stress ulcer DVT prophylaxis  Cardioversion today  Supportive care  Patient is full code  Discussed with family and nursing staff   Will follow-up with Dr. Tan and further recommendation current hospital course    NADIR DUBOSE MD

## 2025-07-17 NOTE — PLAN OF CARE
Goal Outcome Evaluation:  Plan of Care Reviewed With: patient        Progress: no change  Outcome Evaluation: Patient alert and oriented x4, saturating well on room air, Afib on tele, no acte changes overnight. Continue IV Amio drip as ordered per MAR. IV access monitored. Fall prevention protocol maintained. VSS.

## 2025-07-17 NOTE — DISCHARGE SUMMARY
[facility]  Physician Discharge Summary    Patient Identification:  Name: Mag Silva  Age: 79 y.o.  Sex: female  :  1945  MRN: [id number]    Admit date: 2025    Discharge date and time:  25      Admitting Physician: Tony Ayala MD     Discharge Physician: Anne RODRIGUEZ    Discharge Diagnoses:   Active Hospital Problems    Diagnosis     **Atrial fibrillation        Discharged Condition: stable    Hospital Course:   This is a 79 year old female admitted for afib with RVR. Please see H and P for full details of admission. She presented to office with complaints of fatigue, tachycardia, shortness of breath for a couple months. She was found in afib with RVR. She has known afib on amiodarone and eliquis. She had been taking eliquis 2.5mg twice daily up until a week ago. It was decided to admit for VAUGHN and cardioversion, IV amiodarone. She was started on IV amiodarone without complication. She was taken for VAUGHN and cardioversion, that was successful. She tolerated well and recovered without complication. She has remained in sr. She was transitioned to oral amiodarone and will be discharged on amiodarone 200mg twice daily. She has been advised that she will need to remain on eliquis 5mg twice daily. Prescriptions sent in. Vital signs stable and she is cardiovascular stable for discharge with outpatient follow up.     Consults:   IP CONSULT TO INTERNAL MEDICINE  IP CONSULT TO CASE MANAGEMENT     Discharge Exam:  General: No acute distress   Skin: Warm and dry, no diaphoresis noted   EYES: PERTL   HEENT: external ear and nose normal; oral mucosa moist   Neck: Supple; no carotid bruits; no JVD   Heart: S1S2 regular rate and rhythm; no murmurs; no gallop or rub appreciated   Pulmonary: Respirations regular, unlabored at rest, bilateral breath sounds have good air entry throughout all lung fields; no crackles, rubs or wheezes auscultated.     GI: Soft,  non-tender, non-distended, positive bowel sounds  No hepatosplenomegaly   Extremities: Bilateral lower extremities have no pre-tibial pitting edema; DP/PT pulses are palpable   Neurological: Alert and oriented x 3; no neuro deficits          LABS:      Results from last 7 days   Lab Units 07/17/25  0329   MAGNESIUM mg/dL 2.0     Results from last 7 days   Lab Units 07/17/25  0329   SODIUM mmol/L 141   POTASSIUM mmol/L 3.5   BUN mg/dL 12.0   CREATININE mg/dL 0.70   CALCIUM mg/dL 9.3     @LABRCNTbnp@  Results from last 7 days   Lab Units 07/16/25  1655   WBC 10*3/mm3 6.97   HEMOGLOBIN g/dL 13.8   HEMATOCRIT % 42.1   PLATELETS 10*3/mm3 199     Results from last 7 days   Lab Units 07/16/25  1655   INR  1.25*   APTT seconds 32.5     Results from last 7 days   Lab Units 07/17/25  0329   CHOLESTEROL mg/dL 97   TRIGLYCERIDES mg/dL 169*   HDL CHOL mg/dL 31*   LDL CHOL mg/dL 38     Disposition:  Home    Discharge Medications:      Discharge Medications        Changes to Medications        Instructions Start Date   amiodarone 200 MG tablet  Commonly known as: PACERONE  What changed:   medication strength  how much to take  when to take this   200 mg, Oral, 2 Times Daily With Meals      apixaban 5 MG tablet tablet  Commonly known as: ELIQUIS  What changed:   medication strength  how much to take  additional instructions   5 mg, Oral, Every 12 Hours Scheduled      metoprolol tartrate 25 MG tablet  Commonly known as: LOPRESSOR  What changed: how much to take   12.5 mg, Oral, Every 12 Hours Scheduled             Continue These Medications        Instructions Start Date   anastrozole 1 MG tablet  Commonly known as: ARIMIDEX       aspirin 81 MG chewable tablet   81 mg, Nightly      atorvastatin 20 MG tablet  Commonly known as: LIPITOR   20 mg, Nightly      cyclobenzaprine 10 MG tablet  Commonly known as: FLEXERIL   10 mg      fish oil 1000 MG capsule capsule   Daily With Breakfast      levocetirizine 5 MG tablet  Commonly known as:  "XYZAL   5 mg, Daily      MULTI COMPLETE PO   Take  by mouth.      nitroglycerin 0.4 MG SL tablet  Commonly known as: NITROSTAT   1 under the tongue as needed for angina, may repeat q5mins for up three doses      olmesartan-hydrochlorothiazide 40-25 MG per tablet  Commonly known as: Benicar HCT   1 tablet, Oral, Daily      tiotropium bromide-olodaterol 2.5-2.5 MCG/ACT aerosol solution inhaler  Commonly known as: STIOLTO RESPIMAT   Daily - RT      TYLENOL 8 HOUR ARTHRITIS PAIN PO   Take  by mouth.      Vitamin D3 50 MCG (2000 UT) capsule   2,000 Units, Nightly      zolpidem 10 MG tablet  Commonly known as: AMBIEN   10 mg, Nightly PRN                 Discharge Home Instructions:  1. Follow up with Dr Ayala as scheduled  2. Follow up with PCP in 1 week    Signed:  Anne Lizama, APRN  7/17/2025  15:26 EDT      EMR Dragon/Transcription:   \"Dictated utilizing Dragon dictation\".     "

## 2025-07-17 NOTE — PLAN OF CARE
Goal Outcome Evaluation:  Plan of Care Reviewed With: patient        Progress: improving  Outcome Evaluation: Pt AOx4, VSS, RA, cardioversion today to NSR, pt educated on amiodarone dosage change and confirmed that she does have enough on hand until receives from pharmacy, follow up appts reviewed

## 2025-07-17 NOTE — PROGRESS NOTES
Discharge Planning Assessment  [facility]     Patient Name: Mag Silva  MRN: [id number]  Today's Date: 7/17/2025    Admit Date: 7/16/2025    Plan: Return home - lives alone   Discharge Needs Assessment       Row Name 07/17/25 1421       Living Environment    People in Home alone    Current Living Arrangements home    Potentially Unsafe Housing Conditions none    Primary Care Provided by self    Provides Primary Care For no one    Family Caregiver if Needed child(antonio), adult    Family Caregiver Names Daughter Kady Villareal [phone]    Quality of Family Relationships helpful    Able to Return to Prior Arrangements yes       Resource/Environmental Concerns    Resource/Environmental Concerns none    Transportation Concerns none       Transition Planning    Patient/Family Anticipates Transition to home    Patient/Family Anticipated Services at Transition none    Transportation Anticipated family or friend will provide       Discharge Needs Assessment    Readmission Within the Last 30 Days no previous admission in last 30 days    Equipment Currently Used at Home none    Concerns to be Addressed denies needs/concerns at this time    Anticipated Changes Related to Illness none    Equipment Needed After Discharge none                   Discharge Plan       Row Name 07/17/25 1422       Plan    Plan Return home - lives alone    Patient/Family in Agreement with Plan yes    Plan Comments Spoke with patient and daughter Kady Villareal [phone] at bedside.  Patient lives alone in a SS house with 3 ASHLY.  She is IADL, uses no DME, has never used HH or been to SNF.  PCP is Dr. Vicki Caceres and pharmacy is [facility] on [facility] [facility].  Patient drives, daughter lives ~2 doors away and will assist if needed.  She plans to return home at [place].  CCP will follow.  Fany LOUIS                  Continued Care and Services - Admitted Since 7/16/2025    No active coordination exists.       Expected Discharge Date and Time        Expected Discharge Date Expected Discharge Time    Jul 18, 2025            Demographic Summary       Row Name 07/17/25 1421       General Information    Admission Type observation    Arrived From home    Required Notices Provided Observation Status Notice    Referral Source admission list    Reason for Consult discharge planning    Preferred Language English                   Functional Status       Row Name 07/17/25 1421       Functional Status    Usual Activity Tolerance good    Current Activity Tolerance moderate       Functional Status, IADL    Medications independent    Meal Preparation independent    Housekeeping independent    Laundry independent    Shopping independent       Mental Status    General Appearance WDL WDL       Mental Status Summary    Recent Changes in Mental Status/Cognitive Functioning no changes                          Becky S. [place], RN

## 2025-07-17 NOTE — ANESTHESIA POSTPROCEDURE EVALUATION
"Patient: Mag Silva    Procedure Summary       Date: 07/17/25 Room / Location: [facility] PACU    Anesthesia Start: 1006 Anesthesia Stop: 1030    Procedures:       ADULT TRANSESOPHAGEAL ECHO (VAUGHN) W/ CONT IF NECESSARY PER PROTOCOL      CARDIOVERSION EXTERNAL IN CARDIOLOGY DEPARTMENT Diagnosis:       (Arrhythmia)      (AFib)      (afib)    Scheduled Providers: Ha Jackson MD Provider: Samuel Gordon MD    Anesthesia Type: general ASA Status: 3            Anesthesia Type: general    Vitals  Vitals Value Taken Time   BP 98/54 07/17/25 10:32   Temp     Pulse 57 07/17/25 10:34   Resp     SpO2 99 % 07/17/25 10:34   Vitals shown include unfiled device data.        Post Anesthesia Care and Evaluation    Pain management: adequate    Airway patency: patent  Anesthetic complications: No anesthetic complications    Cardiovascular status: acceptable  Respiratory status: acceptable  Hydration status: acceptable    Comments: /89 (BP Location: Left arm, Patient Position: Lying)   Pulse 119   Temp 36.7 °C (98 °F) (Oral)   Resp 16   Ht 162 cm (63.78\")   Wt 95.7 kg (211 lb)   SpO2 95%   BMI 36.47 kg/m²       "

## 2025-07-18 NOTE — PROGRESS NOTES
Case Management Discharge Note      Final Note: DC'd home 7/17         Selected Continued Care - Discharged on 7/17/2025 Admission date: 7/16/2025 - Discharge disposition: Home or Self Care                  Transportation Services  Transportation: Private Transportation  Private: Car    Final Discharge Disposition Code: 01 - home or self-care

## 2025-07-24 NOTE — H&P
"AFIB  HAS TAKEN ELIQUIS 5 MG BID FOR THE LAST 7 DAYS   Subjective:        Mag Silva is a 79 y.o. female who here for follow up    CC  Atrial fibrillation  HPI  79-year-old female with coronary atherosclerosis hypertension hyperlipidemia has recurrent atrial fibrillation unfortunately patient has been taking only half the dose of Eliquis     Problems Addressed this Visit    None  Diagnoses    None.       .    The following portions of the patient's history were reviewed and updated as appropriate: allergies, current medications, past family history, past medical history, past social history, past surgical history and problem list.    Past Medical History:   Diagnosis Date    Atrial fibrillation     Breast cancer     Chest pain     COPD (chronic obstructive pulmonary disease)     Coronary artery disease     Crescendo angina     Dyslipidemia     Hypertension     PVC (premature ventricular contraction)     Syncope      reports that she quit smoking about 21 years ago. Her smoking use included cigarettes. She started smoking about 41 years ago. She has a 30 pack-year smoking history. She has been exposed to tobacco smoke. She has never used smokeless tobacco. She reports that she does not drink alcohol and does not use drugs.   Family History   Problem Relation Age of Onset    Stroke Mother     Asthma Father     Cancer Father        Review of Systems  Constitutional: No wt loss, fever, fatigue  Gastrointestinal: No nausea, abdominal pain  Behavioral/Psych: No insomnia or anxiety   Cardiovascular shortness of breath and palpitation  Objective:       Physical Exam  /54 (BP Location: Left arm, Patient Position: Lying)   Pulse 65   Temp 98.1 °F (36.7 °C) (Oral)   Resp 16   Ht 162 cm (63.78\")   Wt 95.7 kg (211 lb)   SpO2 95%   BMI 36.47 kg/m²   General appearance: No acute changes   Neck: Trachea midline; NECK, supple, no thyromegaly or lymphadenopathy   Lungs: Normal size and shape, normal breath sounds, " equal distribution of air, no rales and rhonchi   CV: S1-S2 regular, no murmurs, no rub, no gallop   Abdomen: Soft, nontender; no masses , no abnormal abdominal sounds   Extremities: No deformity , normal color , no peripheral edema   Skin: Normal temperature, turgor and texture; no rash, ulcers          Procedures      Echocardiogram:    Results for orders placed in visit on 11/09/23    Adult Transthoracic Echo Complete W/ Cont if Necessary Per Protocol    Interpretation Summary    Left ventricular ejection fraction appears to be 66 - 70%.    Left ventricular diastolic function was normal.    Estimated right ventricular systolic pressure from tricuspid regurgitation is mildly elevated (35-45 mmHg).        No current facility-administered medications for this encounter.    Current Outpatient Medications:     Acetaminophen (TYLENOL 8 HOUR ARTHRITIS PAIN PO), Take  by mouth., Disp: , Rfl:     amiodarone (PACERONE) 200 MG tablet, Take 1 tablet by mouth 2 (Two) Times a Day With Meals., Disp: 60 tablet, Rfl: 11    anastrozole (ARIMIDEX) 1 MG tablet, , Disp: , Rfl:     apixaban (ELIQUIS) 5 MG tablet tablet, Take 1 tablet by mouth Every 12 (Twelve) Hours. Indications: Atrial Fibrillation, Disp: 180 tablet, Rfl: 3    aspirin 81 MG chewable tablet, Chew 1 tablet Every Night., Disp: , Rfl:     atorvastatin (LIPITOR) 20 MG tablet, Take 1 tablet by mouth Every Night., Disp: , Rfl:     cyclobenzaprine (FLEXERIL) 10 MG tablet, 1 tablet., Disp: , Rfl:     levocetirizine (XYZAL) 5 MG tablet, Take 1 tablet by mouth Daily., Disp: , Rfl:     metoprolol tartrate (LOPRESSOR) 25 MG tablet, Take 0.5 tablets by mouth Every 12 (Twelve) Hours., Disp: 90 tablet, Rfl: 3    Multiple Vitamins-Minerals (MULTI COMPLETE PO), Take  by mouth., Disp: , Rfl:     olmesartan-hydrochlorothiazide (Benicar HCT) 40-25 MG per tablet, Take 1 tablet by mouth Daily., Disp: 90 tablet, Rfl: 3    Omega-3 Fatty Acids (FISH OIL) 1000 MG capsule capsule, Take  by  mouth Daily With Breakfast., Disp: , Rfl:     tiotropium bromide-olodaterol (STIOLTO RESPIMAT) 2.5-2.5 MCG/ACT aerosol solution inhaler, Inhale Daily., Disp: , Rfl:     zolpidem (AMBIEN) 10 MG tablet, Take 1 tablet by mouth At Night As Needed for Sleep., Disp: , Rfl:     Cholecalciferol (VITAMIN D3) 2000 UNITS capsule, Take 1 capsule by mouth Every Night., Disp: , Rfl:     nitroglycerin (NITROSTAT) 0.4 MG SL tablet, 1 under the tongue as needed for angina, may repeat q5mins for up three doses, Disp: 25 tablet, Rfl: 3   Assessment:                Plan:        No diagnosis found.    1. Coronary arteriosclerosis in native artery  No angina pectoris    2. Benign essential hypertension  Blood pressure control    3. Anticoagulated  Continue Eliquis 5 twice daily    4. Hyperlipidemia, unspecified hyperlipidemia type  Continue current medication    5.  Atrial fibrillation  Recurrent atrial fibrillation highly symptomatic we will admit and proceed with a VAUGHN cardioversion  ADMIT  SYMPTOMATIC A FLUTTER WITH CP  IV AMIO   VAUGHN, CV  COUNSELING:    Mag Alegre was given to patient for the following topics: diagnostic results, risk factor reductions, impressions, risks and benefits of treatment options and importance of treatment compliance .       SMOKING COUNSELING:        Dictated using Dragon dictation

## 2025-07-31 ENCOUNTER — OFFICE VISIT (OUTPATIENT)
Dept: CARDIOLOGY | Facility: CLINIC | Age: 80
End: 2025-07-31
Payer: MEDICARE

## 2025-07-31 VITALS
WEIGHT: 211.6 LBS | SYSTOLIC BLOOD PRESSURE: 146 MMHG | HEART RATE: 50 BPM | HEIGHT: 64 IN | DIASTOLIC BLOOD PRESSURE: 71 MMHG | BODY MASS INDEX: 36.12 KG/M2

## 2025-07-31 DIAGNOSIS — I25.10 CORONARY ARTERIOSCLEROSIS IN NATIVE ARTERY: Primary | ICD-10-CM

## 2025-07-31 DIAGNOSIS — I10 BENIGN ESSENTIAL HYPERTENSION: ICD-10-CM

## 2025-07-31 DIAGNOSIS — Z79.01 ANTICOAGULATED: ICD-10-CM

## 2025-07-31 DIAGNOSIS — I48.0 PAROXYSMAL ATRIAL FIBRILLATION: ICD-10-CM

## 2025-08-12 ENCOUNTER — HOSPITAL ENCOUNTER (OUTPATIENT)
Dept: CARDIOLOGY | Facility: HOSPITAL | Age: 80
Discharge: HOME | End: 2025-08-12
Payer: MEDICARE

## 2025-08-12 DIAGNOSIS — I25.10 CORONARY ARTERIOSCLEROSIS IN NATIVE ARTERY: ICD-10-CM

## 2025-08-12 DIAGNOSIS — I48.0 PAROXYSMAL ATRIAL FIBRILLATION: ICD-10-CM

## 2025-08-13 LAB
QT INTERVAL: 339 MS
QTC INTERVAL: 496 MS

## 2025-08-14 ENCOUNTER — HOSPITAL ENCOUNTER (OUTPATIENT)
Dept: CARDIOLOGY | Facility: HOSPITAL | Age: 80
Discharge: HOME | End: 2025-08-14
Payer: MEDICARE

## 2025-08-14 VITALS
DIASTOLIC BLOOD PRESSURE: 84 MMHG | HEART RATE: 46 BPM | WEIGHT: 211 LBS | HEIGHT: 64 IN | SYSTOLIC BLOOD PRESSURE: 196 MMHG | OXYGEN SATURATION: 98 % | BODY MASS INDEX: 36.02 KG/M2

## 2025-08-14 LAB
BH CV IMMEDIATE POST RECOVERY TECH DATA SYMPTOMS: NORMAL
BH CV IMMEDIATE POST TECH DATA BLOOD PRESSURE: NORMAL MMHG
BH CV IMMEDIATE POST TECH DATA HEART RATE: 75 BPM
BH CV REST NUCLEAR ISOTOPE DOSE: 10.9 MCI
BH CV STRESS BP STAGE 1: NORMAL
BH CV STRESS COMMENTS STAGE 1: NORMAL
BH CV STRESS DOSE REGADENOSON STAGE 1: 0.4
BH CV STRESS DURATION MIN STAGE 1: 3
BH CV STRESS DURATION SEC STAGE 1: 15
BH CV STRESS GRADE STAGE 1: 3
BH CV STRESS HR STAGE 1: 78
BH CV STRESS METS STAGE 1: 2.1
BH CV STRESS NUCLEAR ISOTOPE DOSE: 31.8 MCI
BH CV STRESS PROTOCOL 1: NORMAL
BH CV STRESS RECOVERY BP: NORMAL MMHG
BH CV STRESS RECOVERY HR: 58 BPM
BH CV STRESS RECOVERY O2: 98 %
BH CV STRESS SPEED STAGE 1: 1
BH CV STRESS STAGE 1: 1
BH CV THREE MINUTE POST TECH DATA BLOOD PRESSURE: NORMAL MMHG
BH CV THREE MINUTE POST TECH DATA HEART RATE: 59 BPM
BH CV THREE MINUTE RECOVERY TECH DATA SYMPTOM: NORMAL
MAXIMAL PREDICTED HEART RATE: 142 BPM
PERCENT MAX PREDICTED HR: 54.93 %
SPECT HRT GATED+EF W RNC IV: 70 %
STRESS BASELINE BP: NORMAL MMHG
STRESS BASELINE HR: 46 BPM
STRESS O2 SAT REST: 98 %
STRESS PERCENT HR: 65 %
STRESS POST ESTIMATED WORKLOAD: 2.1 METS
STRESS POST EXERCISE DUR MIN: 3 MIN
STRESS POST EXERCISE DUR SEC: 15 SEC
STRESS POST PEAK BP: NORMAL MMHG
STRESS POST PEAK HR: 78 BPM
STRESS TARGET HR: 121 BPM

## 2025-08-14 PROCEDURE — 78452 HT MUSCLE IMAGE SPECT MULT: CPT

## 2025-08-14 PROCEDURE — A9500 TC99M SESTAMIBI: HCPCS | Performed by: INTERNAL MEDICINE

## 2025-08-14 PROCEDURE — 34310000005 TECHNETIUM SESTAMIBI: Performed by: INTERNAL MEDICINE

## 2025-08-14 PROCEDURE — 93017 CV STRESS TEST TRACING ONLY: CPT

## 2025-08-14 PROCEDURE — 25010000002 REGADENOSON 0.4 MG/5ML SOLUTION: Performed by: INTERNAL MEDICINE

## 2025-08-14 RX ORDER — ALENDRONATE SODIUM 70 MG/1
TABLET ORAL
COMMUNITY
Start: 2025-08-06

## 2025-08-14 RX ORDER — REGADENOSON 0.08 MG/ML
0.4 INJECTION, SOLUTION INTRAVENOUS
Status: COMPLETED | OUTPATIENT
Start: 2025-08-14 | End: 2025-08-14

## 2025-08-14 RX ADMIN — TECHNETIUM TC 99M SESTAMIBI 1 DOSE: 1 INJECTION INTRAVENOUS at 07:28

## 2025-08-14 RX ADMIN — REGADENOSON 0.4 MG: 0.08 INJECTION, SOLUTION INTRAVENOUS at 09:38

## 2025-08-14 RX ADMIN — TECHNETIUM TC 99M SESTAMIBI 1 DOSE: 1 INJECTION INTRAVENOUS at 09:38

## 2025-08-21 ENCOUNTER — OFFICE VISIT (OUTPATIENT)
Dept: CARDIOLOGY | Facility: CLINIC | Age: 80
End: 2025-08-21
Payer: MEDICARE

## 2025-08-21 VITALS
SYSTOLIC BLOOD PRESSURE: 133 MMHG | DIASTOLIC BLOOD PRESSURE: 64 MMHG | BODY MASS INDEX: 36.02 KG/M2 | HEIGHT: 64 IN | WEIGHT: 211 LBS | HEART RATE: 51 BPM

## 2025-08-21 DIAGNOSIS — I25.10 CORONARY ARTERIOSCLEROSIS IN NATIVE ARTERY: Primary | ICD-10-CM

## 2025-08-21 DIAGNOSIS — I10 BENIGN ESSENTIAL HYPERTENSION: ICD-10-CM

## 2025-08-21 DIAGNOSIS — E78.5 HYPERLIPIDEMIA, UNSPECIFIED HYPERLIPIDEMIA TYPE: ICD-10-CM

## 2025-08-21 DIAGNOSIS — I48.0 PAROXYSMAL ATRIAL FIBRILLATION: ICD-10-CM

## 2025-08-21 DIAGNOSIS — Z79.01 ANTICOAGULATED: ICD-10-CM

## 2025-08-21 RX ORDER — ALBUTEROL SULFATE 0.83 MG/ML
SOLUTION RESPIRATORY (INHALATION)
COMMUNITY
Start: 2025-08-14

## 2025-08-21 RX ORDER — AMIODARONE HYDROCHLORIDE 200 MG/1
200 TABLET ORAL DAILY
Qty: 90 TABLET | Refills: 3 | Status: SHIPPED | OUTPATIENT
Start: 2025-08-21

## 2025-08-21 RX ORDER — AMLODIPINE BESYLATE 5 MG/1
5 TABLET ORAL DAILY
Qty: 90 TABLET | Refills: 3 | Status: SHIPPED | OUTPATIENT
Start: 2025-08-21